# Patient Record
Sex: MALE | Race: WHITE | NOT HISPANIC OR LATINO | URBAN - METROPOLITAN AREA
[De-identification: names, ages, dates, MRNs, and addresses within clinical notes are randomized per-mention and may not be internally consistent; named-entity substitution may affect disease eponyms.]

---

## 2022-12-01 ENCOUNTER — TELEPHONE (OUTPATIENT)
Dept: SURGERY | Facility: CLINIC | Age: 67
End: 2022-12-01
Payer: MEDICARE

## 2022-12-01 NOTE — TELEPHONE ENCOUNTER
Spoke with Mr. Myers sister, informed her to bring a colored copy of Mr. Sandoval colonoscopy and his MRI on a disc. She verbalized understanding.

## 2022-12-08 ENCOUNTER — TELEPHONE (OUTPATIENT)
Dept: SURGERY | Facility: CLINIC | Age: 67
End: 2022-12-08
Payer: MEDICARE

## 2022-12-08 NOTE — TELEPHONE ENCOUNTER
Noted patient's address is about 2 hours away. Brother said patient on wheelchair. They have friends/relatives in [place] but for longer stay here, they will need lodging assistance.    ----- Message -----  From: Jesenia Wu RN  Sent: 12/7/2022   4:36 PM CST  To: Cheyenne Teixeira RN, Valdez Nair MD, #  Subject: RE: New Rectal Cancer                            Spoke with brother, Khris Way.  Assessed current needs of patient/family.  Explained process & expectations on consult apptmnt.    Emailing them details of schedule, reminders, driving directions and my contact info.   Will follow pt.    - Jesenia      ----- Message -----  From: Verónica Waite MA  Sent: 12/7/2022   3:53 PM CST  To: Jesenia Wu RN, #  Subject: New Rectal Cancer                                Good evening Jesenia, this is a new rectal cancer pt Dr. Nair will be seeing on 12/20, I have spoken with the sister who is the main contact and she knows to bring recent scans and colored copy of colonoscopy. Dr. Nair asked if you could speak with them and just make sure they are prepared for visit, make sure they have everything the doctor will need for the visit since they are coming from pretty far?       Thank You!

## 2022-12-19 ENCOUNTER — TELEPHONE (OUTPATIENT)
Dept: SURGERY | Facility: CLINIC | Age: 67
End: 2022-12-19
Payer: MEDICARE

## 2022-12-19 NOTE — TELEPHONE ENCOUNTER
Spoke with  Lucia , appointment rescheduled for 1/10 @3:00p at the [facility]. Appointment will be mailed out to pt.

## 2022-12-19 NOTE — TELEPHONE ENCOUNTER
----- Message from Hollie Crawford RN sent at 12/19/2022  3:31 PM CST -----    ----- Message -----  From: Jarred Shafer  Sent: 12/19/2022   3:29 PM CST  To: , #     Pt is would like to reschedule appt on 12.20.22 due to not having transportation. Would like to  be reached @[phone] or [phone]

## 2023-01-09 ENCOUNTER — TELEPHONE (OUTPATIENT)
Dept: SURGERY | Facility: CLINIC | Age: 68
End: 2023-01-09
Payer: MEDICARE

## 2023-01-09 PROBLEM — C20 RECTAL ADENOCARCINOMA: Status: ACTIVE | Noted: 2023-01-09

## 2023-02-03 ENCOUNTER — TELEPHONE (OUTPATIENT)
Dept: SURGERY | Facility: CLINIC | Age: 68
End: 2023-02-03
Payer: MEDICARE

## 2023-02-03 NOTE — TELEPHONE ENCOUNTER
----- Message from Dewayne Aguirre sent at 2/3/2023 12:02 PM CST -----  Contact: angel @[phone]  Caller is calling in to get the details for pt appt as his family does not want to come with him. The facility need to know if anything will need to be signed.  Please call to discuss further.

## 2023-02-03 NOTE — TELEPHONE ENCOUNTER
Spoke with Lina Gonzalez and explained that it would be best if a family member could come to appointment on Tuesday. Explained that plan of care will be discussed, as well as possible surgery or other interventions. Lina states that she will speak to patient's brother and relay previous message. Callback number provided.

## 2023-02-07 ENCOUNTER — TELEPHONE (OUTPATIENT)
Dept: SURGERY | Facility: CLINIC | Age: 68
End: 2023-02-07
Payer: MEDICARE

## 2023-02-07 ENCOUNTER — OFFICE VISIT (OUTPATIENT)
Dept: SURGERY | Facility: CLINIC | Age: 68
End: 2023-02-07
Payer: MEDICARE

## 2023-02-07 ENCOUNTER — LAB VISIT (OUTPATIENT)
Dept: LAB | Facility: HOSPITAL | Age: 68
End: 2023-02-07
Attending: COLON & RECTAL SURGERY
Payer: MEDICARE

## 2023-02-07 DIAGNOSIS — C20 RECTAL CANCER: Primary | ICD-10-CM

## 2023-02-07 DIAGNOSIS — C20 RECTAL CANCER: ICD-10-CM

## 2023-02-07 LAB
ALBUMIN SERPL BCP-MCNC: 3.5 G/DL (ref 3.5–5.2)
ALP SERPL-CCNC: 72 U/L (ref 55–135)
ALT SERPL W/O P-5'-P-CCNC: 14 U/L (ref 10–44)
ANION GAP SERPL CALC-SCNC: 12 MMOL/L (ref 8–16)
AST SERPL-CCNC: 16 U/L (ref 10–40)
BASOPHILS # BLD AUTO: 0.03 K/UL (ref 0–0.2)
BASOPHILS NFR BLD: 0.5 % (ref 0–1.9)
BILIRUB SERPL-MCNC: 0.4 MG/DL (ref 0.1–1)
BUN SERPL-MCNC: 16 MG/DL (ref 8–23)
CALCIUM SERPL-MCNC: 9 MG/DL (ref 8.7–10.5)
CEA SERPL-MCNC: 4.4 NG/ML (ref 0–5)
CHLORIDE SERPL-SCNC: 106 MMOL/L (ref 95–110)
CO2 SERPL-SCNC: 23 MMOL/L (ref 23–29)
CREAT SERPL-MCNC: 0.7 MG/DL (ref 0.5–1.4)
DIFFERENTIAL METHOD: ABNORMAL
EOSINOPHIL # BLD AUTO: 0.1 K/UL (ref 0–0.5)
EOSINOPHIL NFR BLD: 1.8 % (ref 0–8)
ERYTHROCYTE [DISTWIDTH] IN BLOOD BY AUTOMATED COUNT: 14.5 % (ref 11.5–14.5)
EST. GFR  (NO RACE VARIABLE): >60 ML/MIN/1.73 M^2
GLUCOSE SERPL-MCNC: 120 MG/DL (ref 70–110)
HCT VFR BLD AUTO: 36.9 % (ref 40–54)
HGB BLD-MCNC: 11.1 G/DL (ref 14–18)
IMM GRANULOCYTES # BLD AUTO: 0.02 K/UL (ref 0–0.04)
IMM GRANULOCYTES NFR BLD AUTO: 0.3 % (ref 0–0.5)
LYMPHOCYTES # BLD AUTO: 1.1 K/UL (ref 1–4.8)
LYMPHOCYTES NFR BLD: 16.2 % (ref 18–48)
MCH RBC QN AUTO: 25.1 PG (ref 27–31)
MCHC RBC AUTO-ENTMCNC: 30.1 G/DL (ref 32–36)
MCV RBC AUTO: 84 FL (ref 82–98)
MONOCYTES # BLD AUTO: 0.5 K/UL (ref 0.3–1)
MONOCYTES NFR BLD: 8 % (ref 4–15)
NEUTROPHILS # BLD AUTO: 4.8 K/UL (ref 1.8–7.7)
NEUTROPHILS NFR BLD: 73.2 % (ref 38–73)
NRBC BLD-RTO: 0 /100 WBC
PLATELET # BLD AUTO: 262 K/UL (ref 150–450)
PMV BLD AUTO: 8.3 FL (ref 9.2–12.9)
POTASSIUM SERPL-SCNC: 4.3 MMOL/L (ref 3.5–5.1)
PROT SERPL-MCNC: 7.6 G/DL (ref 6–8.4)
RBC # BLD AUTO: 4.42 M/UL (ref 4.6–6.2)
SODIUM SERPL-SCNC: 141 MMOL/L (ref 136–145)
WBC # BLD AUTO: 6.54 K/UL (ref 3.9–12.7)

## 2023-02-07 PROCEDURE — 99999 PR PBB SHADOW E&M-EST. PATIENT-LVL III: CPT | Mod: PBBFAC,,, | Performed by: COLON & RECTAL SURGERY

## 2023-02-07 PROCEDURE — 85025 COMPLETE CBC W/AUTO DIFF WBC: CPT | Performed by: COLON & RECTAL SURGERY

## 2023-02-07 PROCEDURE — 99213 OFFICE O/P EST LOW 20 MIN: CPT | Mod: PBBFAC | Performed by: COLON & RECTAL SURGERY

## 2023-02-07 PROCEDURE — 45330 DIAGNOSTIC SIGMOIDOSCOPY: CPT | Mod: S$PBB,,, | Performed by: COLON & RECTAL SURGERY

## 2023-02-07 PROCEDURE — 99999 PR PBB SHADOW E&M-EST. PATIENT-LVL III: ICD-10-PCS | Mod: PBBFAC,,, | Performed by: COLON & RECTAL SURGERY

## 2023-02-07 PROCEDURE — 45330 DIAGNOSTIC SIGMOIDOSCOPY: CPT | Mod: PBBFAC | Performed by: COLON & RECTAL SURGERY

## 2023-02-07 PROCEDURE — 99205 OFFICE O/P NEW HI 60 MIN: CPT | Mod: S$PBB,25,, | Performed by: COLON & RECTAL SURGERY

## 2023-02-07 PROCEDURE — 99205 PR OFFICE/OUTPT VISIT, NEW, LEVL V, 60-74 MIN: ICD-10-PCS | Mod: S$PBB,25,, | Performed by: COLON & RECTAL SURGERY

## 2023-02-07 PROCEDURE — 80053 COMPREHEN METABOLIC PANEL: CPT | Performed by: COLON & RECTAL SURGERY

## 2023-02-07 PROCEDURE — 36415 COLL VENOUS BLD VENIPUNCTURE: CPT | Performed by: COLON & RECTAL SURGERY

## 2023-02-07 PROCEDURE — 82378 CARCINOEMBRYONIC ANTIGEN: CPT | Performed by: COLON & RECTAL SURGERY

## 2023-02-07 PROCEDURE — 45330 PR SIGMOIDOSCOPY,DIAG2STIC: ICD-10-PCS | Mod: S$PBB,,, | Performed by: COLON & RECTAL SURGERY

## 2023-02-07 RX ORDER — HUMAN INSULIN 100 [USP'U]/ML
INJECTION, SUSPENSION SUBCUTANEOUS
COMMUNITY
Start: 2023-02-02 | End: 2023-04-25 | Stop reason: CLARIF

## 2023-02-07 RX ORDER — DORZOLAMIDE HYDROCHLORIDE AND TIMOLOL MALEATE 20; 5 MG/ML; MG/ML
SOLUTION/ DROPS OPHTHALMIC
COMMUNITY
Start: 2022-12-17

## 2023-02-07 RX ORDER — LORATADINE 10 MG/1
10 TABLET ORAL
COMMUNITY

## 2023-02-07 RX ORDER — FENOFIBRATE 54 MG/1
TABLET ORAL
COMMUNITY
End: 2023-04-25 | Stop reason: CLARIF

## 2023-02-07 RX ORDER — HYDROXYZINE HYDROCHLORIDE 10 MG/1
10 TABLET, FILM COATED ORAL
COMMUNITY
Start: 2023-01-26 | End: 2023-02-25

## 2023-02-07 RX ORDER — LATANOPROST 50 UG/ML
1 SOLUTION/ DROPS OPHTHALMIC NIGHTLY
COMMUNITY

## 2023-02-07 RX ORDER — CLOBETASOL PROPIONATE 0.5 MG/ML
LOTION TOPICAL
COMMUNITY
Start: 2022-12-07

## 2023-02-07 RX ORDER — SERTRALINE HYDROCHLORIDE 50 MG/1
100 TABLET, FILM COATED ORAL NIGHTLY
COMMUNITY

## 2023-02-07 RX ORDER — NYSTATIN 100000 [USP'U]/G
POWDER TOPICAL
COMMUNITY

## 2023-02-07 RX ORDER — LEVETIRACETAM 750 MG/1
750 TABLET ORAL 2 TIMES DAILY
COMMUNITY

## 2023-02-07 RX ORDER — TAMSULOSIN HYDROCHLORIDE 0.4 MG/1
0.4 CAPSULE ORAL
COMMUNITY
Start: 2022-03-29 | End: 2023-04-25

## 2023-02-07 RX ORDER — KETOCONAZOLE 20 MG/ML
SHAMPOO, SUSPENSION TOPICAL
COMMUNITY
Start: 2022-12-07 | End: 2023-04-25 | Stop reason: CLARIF

## 2023-02-07 RX ORDER — POLYETHYLENE GLYCOL 3350 17 G/17G
17 POWDER, FOR SOLUTION ORAL
Status: ON HOLD | COMMUNITY
End: 2023-04-29 | Stop reason: SDUPTHER

## 2023-02-07 RX ORDER — OSELTAMIVIR PHOSPHATE 75 MG/1
CAPSULE ORAL
COMMUNITY
Start: 2022-11-22 | End: 2023-04-25 | Stop reason: CLARIF

## 2023-02-07 RX ORDER — BUSPIRONE HYDROCHLORIDE 7.5 MG/1
7.5 TABLET ORAL 2 TIMES DAILY
COMMUNITY
Start: 2023-01-03

## 2023-02-07 RX ORDER — POLYETHYLENE GLYCOL 3350, SODIUM SULFATE ANHYDROUS, SODIUM BICARBONATE, SODIUM CHLORIDE, POTASSIUM CHLORIDE 236; 22.74; 6.74; 5.86; 2.97 G/4L; G/4L; G/4L; G/4L; G/4L
POWDER, FOR SOLUTION ORAL
Status: ON HOLD | COMMUNITY
Start: 2022-11-07 | End: 2023-04-29 | Stop reason: HOSPADM

## 2023-02-07 RX ORDER — FLUTICASONE PROPIONATE 50 MCG
SPRAY, SUSPENSION (ML) NASAL
COMMUNITY
Start: 2022-11-22

## 2023-02-07 RX ORDER — METFORMIN HYDROCHLORIDE 500 MG/1
500 TABLET ORAL 2 TIMES DAILY WITH MEALS
COMMUNITY
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
## 2023-02-07 NOTE — TELEPHONE ENCOUNTER
CT scheduled for 2/15/ 23 at 10:00 am at [facility]. Spoke with Daquan in scheduling ([phone]) and CT appointment scheduled.     Order faxed to [phone] (scheduling at [facility])    Spoke with Jerrica(patient's brother and sister in law) regarding CT and instructed to arrive at 9:00 am for 10:00 appointment.    Called the [facility] ([phone]) and spoke with Portia. Informed of upcoming CT appointment. Relayed message that patient is to be NPO after midnight and to arrive at 9:00 for 10:00 appointment.    Message sent to PA department for CT scan.

## 2023-02-07 NOTE — PROVATION PATIENT INSTRUCTIONS
Discharge Summary/Instructions after an Endoscopic Procedure  Patient Name: Yan Way  Patient MRN: [id number]  Patient YOB: 1955 Tuesday, February 7, 2023  Valdez Maurer MD  Dear patient,  As a result of recent federal legislation (The Federal Cures Act), you may   receive lab or pathology results from your procedure in your [facility]   account before your physician is able to contact you. Your physician or   their representative will relay the results to you with their   recommendations at their soonest availability.  Thank you,  RESTRICTIONS:  During your procedure today, you received medications for sedation.  These   medications may affect your judgment, balance and coordination.  Therefore,   for 24 hours, you have the following restrictions:   - DO NOT drive a car, operate machinery, make legal/financial decisions,   sign important papers or drink alcohol.    ACTIVITY:  Today: no heavy lifting, straining or running due to procedural   sedation/anesthesia.  The following day: return to full activity including work.  DIET:  Eat and drink normally unless instructed otherwise.     TREATMENT FOR COMMON SIDE EFFECTS:  - Mild abdominal pain, nausea, belching, bloating or excessive gas:  rest,   eat lightly and use a heating pad.  - Sore Throat: treat with throat lozenges and/or gargle with warm salt   water.  - Because air was used during the procedure, expelling large amounts of air   from your rectum or belching is normal.  - If a bowel prep was taken, you may not have a bowel movement for 1-3 days.    This is normal.  SYMPTOMS TO WATCH FOR AND REPORT TO YOUR PHYSICIAN:  1. Abdominal pain or bloating, other than gas cramps.  2. Chest pain.  3. Back pain.  4. Signs of infection such as: chills or fever occurring within 24 hours   after the procedure.  5. Rectal bleeding, which would show as bright red, maroon, or black stools.   (A tablespoon of blood from the rectum is not serious,  especially if   hemorrhoids are present.)  6. Vomiting.  7. Weakness or dizziness.  GO DIRECTLY TO THE NEAREST EMERGENCY ROOM IF YOU HAVE ANY OF THE FOLLOWING:      Difficulty breathing              Chills and/or fever over 101 F   Persistent vomiting and/or vomiting blood   Severe abdominal pain   Severe chest pain   Black, tarry stools   Bleeding- more than one tablespoon   Any other symptom or condition that you feel may need urgent attention  Your doctor recommends these additional instructions:  If any biopsies were taken, your doctors clinic will contact you in 1 to 2   weeks with any results.  - Continue present medications.   - Discharge patient to home (ambulatory).   - Continue present medications.   - Repeat flexible sigmoidoscopy PRN for surveillance.  For questions, problems or results please call your physician - Valdez Maurer MD at Work:  [phone].  [facility], EMERGENCY ROOM PHONE NUMBER: [phone]  IF A COMPLICATION OR EMERGENCY SITUATION ARISES AND YOU ARE UNABLE TO REACH   YOUR PHYSICIAN - GO DIRECTLY TO THE EMERGENCY ROOM.  Valdez Maurer MD  2/7/2023 12:25:53 PM  This report has been verified and signed electronically.  Dear patient,  As a result of recent federal legislation (The Federal Cures Act), you may   receive lab or pathology results from your procedure in your [facility]   account before your physician is able to contact you. Your physician or   their representative will relay the results to you with their   recommendations at their soonest availability.  Thank you,  PROVATION

## 2023-02-07 NOTE — TELEPHONE ENCOUNTER
----- Message from Clinton Infante MA sent at 2/7/2023  2:18 PM CST -----  Regarding: Order needed  Order needed for CT abdomen and pelvis w/ contrast. Requesting to be sent in today.      Byron Cruz [phone] Fax

## 2023-02-23 ENCOUNTER — TELEPHONE (OUTPATIENT)
Dept: SURGERY | Facility: CLINIC | Age: 68
End: 2023-02-23
Payer: MEDICARE

## 2023-02-23 NOTE — TELEPHONE ENCOUNTER
----- Message from Tristin Salazar sent at 2/23/2023  1:11 PM CST -----  Contact: Truong Garibay brother requesting call back RE: would like know plan of treatment, please call to discuss        Confirmed contact below:  Contact Name:Yan Seamus  Phone Number: [phone]

## 2023-02-23 NOTE — TELEPHONE ENCOUNTER
Spoke with patient's brother, Truong (POA),. States that he has discs and reports, and was wondering what the next steps were. Instructed to mail discs and reports, address for [facility] given. Instructed to call a few days after mailing to verify if they have been received. Will follow up with next steps after Dr Maurer reviews imaging.

## 2023-02-23 NOTE — TELEPHONE ENCOUNTER
Spoke with patient's spouse/ sister? States that Mr. Way is in with another physician and asked if we could call back in 30 min. Alarm set.

## 2023-03-08 ENCOUNTER — TUMOR BOARD CONFERENCE (OUTPATIENT)
Dept: SURGERY | Facility: HOSPITAL | Age: 68
End: 2023-03-08
Payer: MEDICARE

## 2023-03-08 NOTE — PROGRESS NOTES
[facility]  Colon, Rectal and Anal Malignancies  Multi-disciplinary Tumor Board     [address]  [place], [place]  Tel: [phone]  Fax: [phone]  [email or web address]     Patient name: Yan Way   YOB: 1955   MRN: [id number]    Date of discussion: 03/08/2023    Thank you for referring Mr. Way to our care here at [facility]. Please allow us to summarize our review of records and recommendations.    The following disciplines were present for the discussion:   Colon and Rectal Surgery  Medical Oncology  Radiation Oncology  Pathology  Radiology    Endoscopy reviewed:   Date performed: 11/11/2022  Yes, complete evaluation of colon and rectum performed    CT Chest, Abdomen and Pelvis   Date performed: 3/3/2023   Performed at our facility: No  Metastatic disease present: No    MRI Rectal Cancer Protocol  Date performed: 12/1/2022   Performed at our facility: No  Tumor location in the rectum: Middle (5-10 cm)  Sphincter involvement: Absent  Pretreatment circumferential resection margin status: Not threatened    Pathology reviewed:   Yes, report only     Pre-treatment CEA:  Date performed: 2/7/2023  CEA Level:  4.4    Pre-treatment Clinical Stage:  T1/T2 - Tumor invades the muscularis propria  N0 - No regional lymph node metastasis suspected  M0 - No metastasis suspected    Based on our review of the current information we have made the following recommendations:    68M h/o DM2, HLD and seizures (wheelchair bound, lives in SNF, incontinent w diaper) p/w low rectal T1/2N0 mod diff adenoCA    Additional laboratory, imaging, or endoscopic studies  Optional: Consider TAMIS for difinitive T staging prior to pursuing LAR; if T1 (SM1), may not require LAR    Therapies  Surgical resection - Low anterior resection  Optional - TAMIS as noted above for T staging prior to definitive surgery    Clinical research study eligibility - No    Referrals  None    Please do  not hesitate to contact us for any questions.

## 2023-03-20 NOTE — PROGRESS NOTES
Called patient and his brother at the nursing facility.  Discussed options for treatment includin) proctectomy/low anterior resection with or without anastomosis.  Discussed that with anastomosis, he would have significant increase in his frequency, clustering, urgency, and fecal leakage.  Discussed that with a colostomy, he would have more independence and ease of cleaning but would have a semi permanent ostomy.  2) transanal full-thickness excision using the TAMIS platform.  Discussed that if he were to have high risk features, he may require adjuvant radiation versus completion proctectomy.  Discussed that adjuvant radiation would be an oncologic compromise.  Additionally, radiation would impact his function.    My recommendation was to proceed with a low anterior resection with end colostomy creation in order to cure/fully stage the rectal cancer as well as provide fecal diversion secondary to his significant immobility.  All questions were answered.  He wishes to consider this further with his family.  I will follow up with him later in the week.    BRISA Maurer MD, FACS, FASCRS  Staff Surgeon  Colon & Rectal Surgery

## 2023-03-21 ENCOUNTER — TELEPHONE (OUTPATIENT)
Dept: SURGERY | Facility: CLINIC | Age: 68
End: 2023-03-21
Payer: MEDICARE

## 2023-03-21 NOTE — TELEPHONE ENCOUNTER
Spoke with sister in law and brother. Dr. Maurer states that he does not need to see oncologist in [place] today for appointment. Family verbalizes understanding.

## 2023-04-04 ENCOUNTER — TELEPHONE (OUTPATIENT)
Dept: SURGERY | Facility: CLINIC | Age: 68
End: 2023-04-04
Payer: MEDICARE

## 2023-04-04 NOTE — TELEPHONE ENCOUNTER
Spoke with brother regarding scheduling surgery. Truong states that his brother is currently in the hospital right now for pneumonia. Explained that I will have Dr. Maurer reach out to discuss plan of care.

## 2023-04-04 NOTE — TELEPHONE ENCOUNTER
----- Message from Jose Miguel Noland sent at 4/4/2023  1:08 PM CDT -----  Regarding: Appt  Contact: Truong/ [phone]  Truong/ is calling to discuss scheduling surgery, please call

## 2023-04-07 ENCOUNTER — TELEPHONE (OUTPATIENT)
Dept: SURGERY | Facility: CLINIC | Age: 68
End: 2023-04-07
Payer: MEDICARE

## 2023-04-07 NOTE — TELEPHONE ENCOUNTER
I called and spoke with the patient's brother.  He is recently hospitalized for pneumonia but is now out and off of oxygen.  After a long discussion with the family and the patient, they wished to proceed with low anterior resection with permanent end colostomy.  Will plan to leave a short Sharon stump.  I do agree that this will provide him with improvement in his quality of life and greater ease of caretaking with a colostomy given his relative immobility.  Offered dates of April 19 and April 26th.  I will follow up with him on Monday.    BRISA Maurer MD, FACS, FASCRS  Staff Surgeon  Colon & Rectal Surgery

## 2023-04-11 ENCOUNTER — TELEPHONE (OUTPATIENT)
Dept: SURGERY | Facility: CLINIC | Age: 68
End: 2023-04-11
Payer: MEDICARE

## 2023-04-11 DIAGNOSIS — C20 RECTAL CANCER: Primary | ICD-10-CM

## 2023-04-11 RX ORDER — CIPROFLOXACIN 500 MG/1
500 TABLET ORAL 2 TIMES DAILY
Qty: 2 TABLET | Refills: 0 | Status: SHIPPED | OUTPATIENT
Start: 2023-04-11 | End: 2023-04-12

## 2023-04-11 RX ORDER — METRONIDAZOLE 500 MG/1
500 TABLET ORAL 2 TIMES DAILY
Qty: 2 TABLET | Refills: 0 | Status: SHIPPED | OUTPATIENT
Start: 2023-04-11 | End: 2023-04-12

## 2023-04-11 RX ORDER — POLYETHYLENE GLYCOL 3350 17 G/17G
POWDER, FOR SOLUTION ORAL
Qty: 289 G | Refills: 0 | Status: ON HOLD | OUTPATIENT
Start: 2023-04-11 | End: 2023-04-29 | Stop reason: HOSPADM

## 2023-04-11 NOTE — PROGRESS NOTES
Returned a phone call to the brother.  They wished to proceed with laparoscopic low anterior resection with end colostomy creation.  They are agreeable to April 26th.  Will reach out to the nursing home to arrange transportation.  Full bowel prep.  Will plan for marking and consents the morning of surgery.      BRISA Maurer MD, FACS, FASCRS  Staff Surgeon  Colon & Rectal Surgery

## 2023-04-11 NOTE — TELEPHONE ENCOUNTER
----- Message from BRISA Maurer MD sent at 4/11/2023 10:34 AM CDT -----  I will put him on for lap LAR with end colostomy on 4/26/23.   I spoke with his brother and they have agreed.      He is at a nursing home ([facility] [place], [place]  Their number is [phone].  Can you help for transport?      He will need a prep and we will do consents and marking in preop.     THANK YOU!    Mj

## 2023-04-11 NOTE — TELEPHONE ENCOUNTER
Spoke with Trudy at [facility] regarding upcoming surgery and pre-op instructions. Arrival time of 0900 provided. Instructions and information faxed to [phone]. Phone number provided for any concerns or questions.

## 2023-04-12 ENCOUNTER — PATIENT MESSAGE (OUTPATIENT)
Dept: RESEARCH | Facility: HOSPITAL | Age: 68
End: 2023-04-12
Payer: MEDICARE

## 2023-04-14 ENCOUNTER — TELEPHONE (OUTPATIENT)
Dept: SURGERY | Facility: CLINIC | Age: 68
End: 2023-04-14
Payer: MEDICARE

## 2023-04-14 NOTE — TELEPHONE ENCOUNTER
"Spoke with Sona at [facility] regarding surgical prep. Sona states, "another nurse found the information that was previously faxed." Denies further questions or concerns.       ----- Message from Rosie Callahan RN sent at 4/13/2023  5:27 PM CDT -----  Regarding: FW: Surgery Paperwork  Contact: Sona @ [phone]    ----- Message -----  From: Benita Vaca  Sent: 4/13/2023   3:58 PM CDT  To: Erasto Barker Staff  Subject: Surgery Paperwork                                Sona from [facility] is calling to get paperwork on pt's upcoming surgery. Asking if it can be faxed to [phone]      "

## 2023-04-14 NOTE — TELEPHONE ENCOUNTER
----- Message from Tiffanie Zimmerman sent at 4/14/2023 10:40 AM CDT -----  Who Called: Charge Nurse    What is the request in detail: Requesting call back from Nurse Browne to discuss instructions for patient's prep and medications instructions. Caller is asking for those to be faxed to [phone]. Please advise.     Can the clinic reply by [facility]? No    Best Call Back Number: [phone]    Additional Information:     Symptom Screen outcome:

## 2023-04-24 ENCOUNTER — TELEPHONE (OUTPATIENT)
Dept: SURGERY | Facility: CLINIC | Age: 68
End: 2023-04-24
Payer: MEDICARE

## 2023-04-24 NOTE — TELEPHONE ENCOUNTER
Talked to patient's brother Truong. Went over clear liquid diet for tomorrow and told him about the antibiotics sent. He seemed confused, explained to him a couple of times.     Will ask Hollie Crawford RN, to follow up on this tomorrow.

## 2023-04-24 NOTE — TELEPHONE ENCOUNTER
----- Message from Gillian Mata sent at 4/24/2023  5:06 PM CDT -----  Type: General Call Back     Name of Caller:Patients brother  Symptoms:pre operation information   Would the patient rather a call back or a response via [employer]? Call back   Best Call Back Number:[phone]  Additional Information: Patients brother indicates he is supposed to get a call from the providers office for some pre operation information. Patients brother indicates he would like a call as soon as possible. Patients brother indicates he is supposed to get some directions regarding fasting and medications as well. Please call back with further assistance if possible.

## 2023-04-25 ENCOUNTER — TELEPHONE (OUTPATIENT)
Dept: SURGERY | Facility: CLINIC | Age: 68
End: 2023-04-25
Payer: MEDICARE

## 2023-04-25 ENCOUNTER — ANESTHESIA EVENT (OUTPATIENT)
Dept: SURGERY | Facility: HOSPITAL | Age: 68
DRG: 331 | End: 2023-04-25
Payer: MEDICARE

## 2023-04-25 NOTE — PRE-PROCEDURE INSTRUCTIONS
PreOp Instructions given:   - Verbal medication information (what to hold and what to take)   - NPO guidelines   - Arrival place directions given; time to be given the day before procedure by the   Surgeon's Office   - Bathing with antibacterial soap   - Don't wear any jewelry or bring any valuables AM of surgery   - No makeup or moisturizer to face   - No perfume/cologne, powder, lotions or aftershave   Pt. verbalized understanding.   Pt denies any h/o Anesthesia/Sedation complications or side effects.

## 2023-04-25 NOTE — TELEPHONE ENCOUNTER
Left voicemail with callback number regarding brother's surgery tomorrow. Instructed to call back to discuss, if needed.

## 2023-04-25 NOTE — TELEPHONE ENCOUNTER
Spoke with Melo Gonzalez RN, at the [facility], to confirm arrival time for surgery tomorrow morning.

## 2023-04-25 NOTE — TELEPHONE ENCOUNTER
----- Message from Kelsey Adhikari sent at 4/25/2023  2:21 PM CDT -----  JAMEY Pinedo calling regarding Patient Advice for having questions about the surgery on tomorrow for the PT, call back [phone]

## 2023-04-25 NOTE — ANESTHESIA PREPROCEDURE EVALUATION
[facility] [address]  Anesthesia Pre-Operative Evaluation        Patient Name: Yan Way  YOB: 1955  MRN: [id number]    SUBJECTIVE:     Pre-operative Evaluation for Procedure(s) (LRB):  PROCTECTOMY, LAPAROSCOPIC, ERAS high (N/A)  SIGMOIDOSCOPY, FLEXIBLE (N/A)     04/25/2023    Yan Way is a 68 y.o. male with a PMHx significant for T2DM, seizure disorder (on Keppra), and multiple remote traumatic lower extremity fractures (now wheelchair bound with limited mobility) with rectal adenocarcinoma for which he has elected surgical resection.      He now presents for the above procedure(s) with Colon & Rectal Surgery - Dr. Maurer    Previous Airway (08/11/2021 - via [facility]Everywhere):   Blade: Mac 3  Airway Device: ETT  Secured: 21cm at lips  Comments: Easy intubation, Easy insertion    Patient Active Problem List   Diagnosis    Rectal adenocarcinoma       Review of patient's allergies indicates:   Allergen Reactions    Latex, natural rubber Anxiety       Current Outpatient Medications   Medication Instructions    busPIRone (BUSPAR) 7.5 mg, Oral, 2 times daily    clobetasoL (CLOBEX) 0.05 % lotion Topical (Top)    dorzolamide-timolol 2-0.5% (COSOPT) 22.3-6.8 mg/mL ophthalmic solution Both Eyes    fluticasone propionate (FLONASE) 50 mcg/actuation nasal spray Each Nostril    insulin NPH-insulin regular, 70/30, 100 unit/mL (70-30) injection 36 Units in AM and 26 Units in PM    latanoprost 0.005 % ophthalmic solution 1 drop, Ophthalmic, Nightly    levETIRAcetam (KEPPRA) 750 mg, Oral, 2 times daily    loratadine (CLARITIN) 10 mg, Oral    magnesium chloride (MAG 64) 64 mg TbEC 1 tablet, Oral, Daily    metFORMIN (GLUCOPHAGE) 500 mg, Oral, 2 times daily with meals    nystatin (MYCOSTATIN) powder nystatin 100,000 unit/gram topical powder   APPLY TO THE AFFECTED AREA(S) BY TOPICAL ROUTE 2 TIMES PER DAY UNTIL HEALED    phenylephrine-DM-guaiFENesin 5- mg/5 mL Liqd Oral     polyethylene glycol (GLYCOLAX) 17 gram/dose powder Use 1 capful of Miralax per 8 ounces of clear liquid for 8 doses the night before surgery.    polyethylene glycol (GLYCOLAX) 17 g, Oral    polyethylene glycol (GOLYTELY) 236-22.74-6.74 -5.86 gram suspension No dose, route, or frequency recorded.    salicylic acid 2 g    sertraline (ZOLOFT) 100 mg, Oral, Nightly    tamsulosin (FLOMAX) 0.4 mg, Oral       Past Surgical History:   Procedure Laterality Date    BACK SURGERY      HIP REPLACEMENT ARTHROPLASTY      TIBIA FRACTURE SURGERY         Social History     Substance and Sexual Activity   Drug Use Not Currently     Alcohol Use: Not on file     Tobacco Use: Unknown    Smoking Tobacco Use: Never    Smokeless Tobacco Use: Unknown    Passive Exposure: Not on file       OBJECTIVE:     Vital Signs Range (Last 24H):         Significant Labs    Heme Profile  Lab Results   Component Value Date    WBC 6.54 02/07/2023    HGB 11.1 (L) 02/07/2023    HCT 36.9 (L) 02/07/2023     02/07/2023       Coagulation Studies  No results found for: LABPROT, INR, APTT    BMP  Lab Results   Component Value Date     02/07/2023    K 4.3 02/07/2023     02/07/2023    CO2 23 02/07/2023    BUN 16 02/07/2023    CREATININE 0.7 02/07/2023       Liver Function Tests  Lab Results   Component Value Date    AST 16 02/07/2023    ALT 14 02/07/2023    ALKPHOS 72 02/07/2023    BILITOT 0.4 02/07/2023    PROT 7.6 02/07/2023    ALBUMIN 3.5 02/07/2023       Endocrine Profile  No results found for: HGBA1C, TSH    Diagnostic Studies    MRI Pelvis with/without Contrast (12/01/2022 - via Media):      Cardiac Studies    EKG:   No results found for this or any previous visit.    Echocardiogram Adult (08/11/2021):  1.The estimated LV ejection fraction is 65%  2.There are no wall motion abnormalities observed.  3.Normal left atrial pressure and diastolic function.  4.There is mild mitral regurgitation.                                 5.The  estimated RV systolic pressure was not measured in the absence of an adequate TR jet.         6.Compared to the prior study dated 11/02/2020, there is no significant change.      ASSESSMENT/PLAN:     Yan Way is a 68 y.o. male with rectal cancer presenting for LAR with end colostomy creation.    Pre-op Assessment    I have reviewed the Patient Summary Reports.     I have reviewed the Nursing Notes.    I have reviewed the Medications.     Review of Systems  Anesthesia Hx:  No problems with previous Anesthesia  History of prior surgery of interest to airway management or planning:  Denies Personal Hx of Anesthesia complications.   Hematology/Oncology:         -- Anemia: Anemia of Chronic Disease Current/Recent Cancer. (recent rectal ca diagnosis with scope)   Cardiovascular:   Denies Hypertension.  Denies MI.    Denies Angina.  Functional Capacity very limited / < 3 METS  Denies Coronary Artery Disease.   Denies Hypertension.   Denies Disorder of Cardiac Rhythm    Pulmonary:   Denies COPD.  Denies Asthma.  Denies Shortness of breath.  Denies Asthma.  Denies Chronic Obstructive Pulmonary Disease (COPD).  Denies Obstructive Sleep Apnea (TRICE)   Renal/:   Denies Chronic Renal Disease.   Denies Kidney Function/Disease    Hepatic/GI:   Denies Liver Disease.    Musculoskeletal:  Musculoskeletal General/Symptoms: Functional capacity is wheelchair dependant.    Neurological:   Seizures (every 2-3 months)  Seizure Disorder  Denies CVA - Cerebrovasular Accident    Endocrine:   Diabetes, type 2, using insulin  Diabetes, Type 2 Diabetes  Denies Thyroid Disease           Anesthesia Plan  Type of Anesthesia, risks & benefits discussed:    Anesthesia Type: Gen ETT  Intra-op Monitoring Plan: Standard ASA Monitors and Art Line  Post Op Pain Control Plan: multimodal analgesia and IV/PO Opioids PRN  Induction:  IV  Airway Plan: Direct, Post-Induction  Informed Consent: Informed consent signed with the Patient and all parties  understand the risks and agree with anesthesia plan.  All questions answered.   ASA Score: 2  Day of Surgery Review of History & Physical: H&P Update referred to the surgeon/provider.    Ready For Surgery From Anesthesia Perspective.     .

## 2023-04-26 ENCOUNTER — ANESTHESIA (OUTPATIENT)
Dept: SURGERY | Facility: HOSPITAL | Age: 68
DRG: 331 | End: 2023-04-26
Payer: MEDICARE

## 2023-04-26 ENCOUNTER — HOSPITAL ENCOUNTER (INPATIENT)
Facility: HOSPITAL | Age: 68
LOS: 3 days | DRG: 331 | End: 2023-04-29
Attending: COLON & RECTAL SURGERY | Admitting: COLON & RECTAL SURGERY
Payer: MEDICARE

## 2023-04-26 DIAGNOSIS — C20 RECTAL ADENOCARCINOMA: Primary | ICD-10-CM

## 2023-04-26 DIAGNOSIS — K62.89 RECTAL MASS: ICD-10-CM

## 2023-04-26 DIAGNOSIS — E11.40 TYPE 2 DIABETES MELLITUS WITH DIABETIC NEUROPATHY, WITH LONG-TERM CURRENT USE OF INSULIN: ICD-10-CM

## 2023-04-26 DIAGNOSIS — R53.81 PHYSICAL DECONDITIONING: ICD-10-CM

## 2023-04-26 DIAGNOSIS — Z79.4 TYPE 2 DIABETES MELLITUS WITH DIABETIC NEUROPATHY, WITH LONG-TERM CURRENT USE OF INSULIN: ICD-10-CM

## 2023-04-26 DIAGNOSIS — E11.42 TYPE 2 DIABETES MELLITUS WITH DIABETIC POLYNEUROPATHY, WITH LONG-TERM CURRENT USE OF INSULIN: ICD-10-CM

## 2023-04-26 DIAGNOSIS — Z79.4 TYPE 2 DIABETES MELLITUS WITH DIABETIC POLYNEUROPATHY, WITH LONG-TERM CURRENT USE OF INSULIN: ICD-10-CM

## 2023-04-26 LAB
ABO + RH BLD: NORMAL
BLD GP AB SCN CELLS X3 SERPL QL: NORMAL
POCT GLUCOSE: 182 MG/DL (ref 70–110)
POCT GLUCOSE: 197 MG/DL (ref 70–110)
SPECIMEN OUTDATE: NORMAL

## 2023-04-26 PROCEDURE — 20600001 HC STEP DOWN PRIVATE ROOM

## 2023-04-26 PROCEDURE — 63600175 PHARM REV CODE 636 W HCPCS: Performed by: STUDENT IN AN ORGANIZED HEALTH CARE EDUCATION/TRAINING PROGRAM

## 2023-04-26 PROCEDURE — 71000033 HC RECOVERY, INTIAL HOUR: Performed by: COLON & RECTAL SURGERY

## 2023-04-26 PROCEDURE — 25000003 PHARM REV CODE 250: Performed by: NURSE PRACTITIONER

## 2023-04-26 PROCEDURE — 88309 PR  SURG PATH,LEVEL VI: ICD-10-PCS | Mod: 26,,, | Performed by: PATHOLOGY

## 2023-04-26 PROCEDURE — D9220A PRA ANESTHESIA: ICD-10-PCS | Mod: ANES,,, | Performed by: ANESTHESIOLOGY

## 2023-04-26 PROCEDURE — 63600175 PHARM REV CODE 636 W HCPCS: Performed by: NURSE ANESTHETIST, CERTIFIED REGISTERED

## 2023-04-26 PROCEDURE — 25000003 PHARM REV CODE 250: Performed by: NURSE ANESTHETIST, CERTIFIED REGISTERED

## 2023-04-26 PROCEDURE — 71000015 HC POSTOP RECOV 1ST HR: Performed by: COLON & RECTAL SURGERY

## 2023-04-26 PROCEDURE — 88309 TISSUE EXAM BY PATHOLOGIST: CPT | Mod: 26,,, | Performed by: PATHOLOGY

## 2023-04-26 PROCEDURE — 99900035 HC TECH TIME PER 15 MIN (STAT)

## 2023-04-26 PROCEDURE — 82962 GLUCOSE BLOOD TEST: CPT | Performed by: COLON & RECTAL SURGERY

## 2023-04-26 PROCEDURE — D9220A PRA ANESTHESIA: ICD-10-PCS | Mod: CRNA,,, | Performed by: NURSE ANESTHETIST, CERTIFIED REGISTERED

## 2023-04-26 PROCEDURE — 36415 COLL VENOUS BLD VENIPUNCTURE: CPT | Performed by: COLON & RECTAL SURGERY

## 2023-04-26 PROCEDURE — 44208 PR LAP,SURG,COLECTOMY,W/ANAST,W/COLOSTOMY: ICD-10-PCS | Mod: ,,, | Performed by: COLON & RECTAL SURGERY

## 2023-04-26 PROCEDURE — 25000003 PHARM REV CODE 250: Performed by: STUDENT IN AN ORGANIZED HEALTH CARE EDUCATION/TRAINING PROGRAM

## 2023-04-26 PROCEDURE — 63600175 PHARM REV CODE 636 W HCPCS: Performed by: ANESTHESIOLOGY

## 2023-04-26 PROCEDURE — 71000039 HC RECOVERY, EACH ADD'L HOUR: Performed by: COLON & RECTAL SURGERY

## 2023-04-26 PROCEDURE — D9220A PRA ANESTHESIA: Mod: CRNA,,, | Performed by: NURSE ANESTHETIST, CERTIFIED REGISTERED

## 2023-04-26 PROCEDURE — 63600175 PHARM REV CODE 636 W HCPCS: Performed by: NURSE PRACTITIONER

## 2023-04-26 PROCEDURE — 88309 TISSUE EXAM BY PATHOLOGIST: CPT | Performed by: PATHOLOGY

## 2023-04-26 PROCEDURE — 36000710: Performed by: COLON & RECTAL SURGERY

## 2023-04-26 PROCEDURE — 27201423 OPTIME MED/SURG SUP & DEVICES STERILE SUPPLY: Performed by: COLON & RECTAL SURGERY

## 2023-04-26 PROCEDURE — 37000009 HC ANESTHESIA EA ADD 15 MINS: Performed by: COLON & RECTAL SURGERY

## 2023-04-26 PROCEDURE — D9220A PRA ANESTHESIA: Mod: ANES,,, | Performed by: ANESTHESIOLOGY

## 2023-04-26 PROCEDURE — 86900 BLOOD TYPING SEROLOGIC ABO: CPT | Performed by: NURSE PRACTITIONER

## 2023-04-26 PROCEDURE — 44208 L COLECTOMY/COLOPROCTOSTOMY: CPT | Mod: ,,, | Performed by: COLON & RECTAL SURGERY

## 2023-04-26 PROCEDURE — 36000711: Performed by: COLON & RECTAL SURGERY

## 2023-04-26 PROCEDURE — 37000008 HC ANESTHESIA 1ST 15 MINUTES: Performed by: COLON & RECTAL SURGERY

## 2023-04-26 PROCEDURE — 94761 N-INVAS EAR/PLS OXIMETRY MLT: CPT

## 2023-04-26 PROCEDURE — S0030 INJECTION, METRONIDAZOLE: HCPCS | Performed by: STUDENT IN AN ORGANIZED HEALTH CARE EDUCATION/TRAINING PROGRAM

## 2023-04-26 RX ORDER — OXYCODONE HYDROCHLORIDE 5 MG/1
5 TABLET ORAL EVERY 6 HOURS PRN
Status: DISCONTINUED | OUTPATIENT
Start: 2023-04-26 | End: 2023-04-29 | Stop reason: HOSPADM

## 2023-04-26 RX ORDER — SERTRALINE HYDROCHLORIDE 100 MG/1
100 TABLET, FILM COATED ORAL NIGHTLY
Status: DISCONTINUED | OUTPATIENT
Start: 2023-04-26 | End: 2023-04-29 | Stop reason: HOSPADM

## 2023-04-26 RX ORDER — DEXTROSE 40 %
15 GEL (GRAM) ORAL
Status: DISCONTINUED | OUTPATIENT
Start: 2023-04-26 | End: 2023-04-29 | Stop reason: HOSPADM

## 2023-04-26 RX ORDER — METRONIDAZOLE 500 MG/100ML
500 INJECTION, SOLUTION INTRAVENOUS
Status: DISCONTINUED | OUTPATIENT
Start: 2023-04-26 | End: 2023-04-26 | Stop reason: HOSPADM

## 2023-04-26 RX ORDER — GABAPENTIN 300 MG/1
300 CAPSULE ORAL
Status: COMPLETED | OUTPATIENT
Start: 2023-04-26 | End: 2023-04-26

## 2023-04-26 RX ORDER — GABAPENTIN 300 MG/1
300 CAPSULE ORAL 3 TIMES DAILY
Status: DISCONTINUED | OUTPATIENT
Start: 2023-04-26 | End: 2023-04-26

## 2023-04-26 RX ORDER — SODIUM CHLORIDE 9 MG/ML
INJECTION, SOLUTION INTRAVENOUS
Status: DISCONTINUED | OUTPATIENT
Start: 2023-04-26 | End: 2023-04-26 | Stop reason: HOSPADM

## 2023-04-26 RX ORDER — ONDANSETRON 2 MG/ML
4 INJECTION INTRAMUSCULAR; INTRAVENOUS EVERY 12 HOURS PRN
Status: DISCONTINUED | OUTPATIENT
Start: 2023-04-26 | End: 2023-04-29 | Stop reason: HOSPADM

## 2023-04-26 RX ORDER — TAMSULOSIN HYDROCHLORIDE 0.4 MG/1
0.4 CAPSULE ORAL DAILY
Status: DISCONTINUED | OUTPATIENT
Start: 2023-04-27 | End: 2023-04-29 | Stop reason: HOSPADM

## 2023-04-26 RX ORDER — DEXAMETHASONE SODIUM PHOSPHATE 4 MG/ML
INJECTION, SOLUTION INTRA-ARTICULAR; INTRALESIONAL; INTRAMUSCULAR; INTRAVENOUS; SOFT TISSUE
Status: DISCONTINUED | OUTPATIENT
Start: 2023-04-26 | End: 2023-04-26

## 2023-04-26 RX ORDER — FLUTICASONE PROPIONATE 50 MCG
2 SPRAY, SUSPENSION (ML) NASAL DAILY
Status: DISCONTINUED | OUTPATIENT
Start: 2023-04-27 | End: 2023-04-29 | Stop reason: HOSPADM

## 2023-04-26 RX ORDER — LEVETIRACETAM 750 MG/1
750 TABLET ORAL 2 TIMES DAILY
Status: DISCONTINUED | OUTPATIENT
Start: 2023-04-26 | End: 2023-04-29 | Stop reason: HOSPADM

## 2023-04-26 RX ORDER — SODIUM CHLORIDE 0.9 % (FLUSH) 0.9 %
3 SYRINGE (ML) INJECTION
Status: DISCONTINUED | OUTPATIENT
Start: 2023-04-26 | End: 2023-04-26 | Stop reason: HOSPADM

## 2023-04-26 RX ORDER — OXYCODONE HYDROCHLORIDE 10 MG/1
10 TABLET ORAL EVERY 4 HOURS PRN
Status: DISCONTINUED | OUTPATIENT
Start: 2023-04-26 | End: 2023-04-29 | Stop reason: HOSPADM

## 2023-04-26 RX ORDER — ONDANSETRON 2 MG/ML
4 INJECTION INTRAMUSCULAR; INTRAVENOUS DAILY PRN
Status: DISCONTINUED | OUTPATIENT
Start: 2023-04-26 | End: 2023-04-26 | Stop reason: HOSPADM

## 2023-04-26 RX ORDER — SODIUM CHLORIDE 9 MG/ML
INJECTION, SOLUTION INTRAVENOUS CONTINUOUS
Status: DISCONTINUED | OUTPATIENT
Start: 2023-04-26 | End: 2023-04-27

## 2023-04-26 RX ORDER — TRAMADOL HYDROCHLORIDE 50 MG/1
50 TABLET ORAL EVERY 6 HOURS PRN
Status: DISCONTINUED | OUTPATIENT
Start: 2023-04-26 | End: 2023-04-29 | Stop reason: HOSPADM

## 2023-04-26 RX ORDER — SODIUM CHLORIDE 0.9 % (FLUSH) 0.9 %
10 SYRINGE (ML) INJECTION
Status: DISCONTINUED | OUTPATIENT
Start: 2023-04-26 | End: 2023-04-29 | Stop reason: HOSPADM

## 2023-04-26 RX ORDER — PHENYLEPHRINE HYDROCHLORIDE 10 MG/ML
INJECTION INTRAVENOUS
Status: DISCONTINUED | OUTPATIENT
Start: 2023-04-26 | End: 2023-04-26

## 2023-04-26 RX ORDER — GABAPENTIN 300 MG/1
300 CAPSULE ORAL 3 TIMES DAILY
Status: DISCONTINUED | OUTPATIENT
Start: 2023-04-26 | End: 2023-04-29 | Stop reason: HOSPADM

## 2023-04-26 RX ORDER — LIDOCAINE HYDROCHLORIDE ANHYDROUS AND DEXTROSE MONOHYDRATE .8; 5 G/100ML; G/100ML
INJECTION, SOLUTION INTRAVENOUS CONTINUOUS PRN
Status: DISCONTINUED | OUTPATIENT
Start: 2023-04-26 | End: 2023-04-26

## 2023-04-26 RX ORDER — ACETAMINOPHEN 10 MG/ML
1000 INJECTION, SOLUTION INTRAVENOUS EVERY 8 HOURS
Status: DISCONTINUED | OUTPATIENT
Start: 2023-04-26 | End: 2023-04-27

## 2023-04-26 RX ORDER — LIDOCAINE HYDROCHLORIDE 20 MG/ML
INJECTION, SOLUTION EPIDURAL; INFILTRATION; INTRACAUDAL; PERINEURAL
Status: DISCONTINUED | OUTPATIENT
Start: 2023-04-26 | End: 2023-04-26

## 2023-04-26 RX ORDER — ROCURONIUM BROMIDE 10 MG/ML
INJECTION, SOLUTION INTRAVENOUS
Status: DISCONTINUED | OUTPATIENT
Start: 2023-04-26 | End: 2023-04-26

## 2023-04-26 RX ORDER — IBUPROFEN 400 MG/1
800 TABLET ORAL EVERY 8 HOURS
Status: DISCONTINUED | OUTPATIENT
Start: 2023-04-27 | End: 2023-04-27

## 2023-04-26 RX ORDER — OXYCODONE AND ACETAMINOPHEN 5; 325 MG/1; MG/1
1 TABLET ORAL
Status: DISCONTINUED | OUTPATIENT
Start: 2023-04-26 | End: 2023-04-26

## 2023-04-26 RX ORDER — HEPARIN SODIUM 5000 [USP'U]/ML
5000 INJECTION, SOLUTION INTRAVENOUS; SUBCUTANEOUS EVERY 8 HOURS
Status: COMPLETED | OUTPATIENT
Start: 2023-04-26 | End: 2023-04-26

## 2023-04-26 RX ORDER — MUPIROCIN 20 MG/G
OINTMENT TOPICAL 2 TIMES DAILY
Status: DISCONTINUED | OUTPATIENT
Start: 2023-04-26 | End: 2023-04-29 | Stop reason: HOSPADM

## 2023-04-26 RX ORDER — CETIRIZINE HYDROCHLORIDE 10 MG/1
10 TABLET ORAL DAILY
Status: DISCONTINUED | OUTPATIENT
Start: 2023-04-27 | End: 2023-04-29 | Stop reason: HOSPADM

## 2023-04-26 RX ORDER — KETAMINE HCL IN 0.9 % NACL 50 MG/5 ML
SYRINGE (ML) INTRAVENOUS
Status: DISCONTINUED | OUTPATIENT
Start: 2023-04-26 | End: 2023-04-26

## 2023-04-26 RX ORDER — ONDANSETRON 2 MG/ML
INJECTION INTRAMUSCULAR; INTRAVENOUS
Status: DISCONTINUED | OUTPATIENT
Start: 2023-04-26 | End: 2023-04-26

## 2023-04-26 RX ORDER — MUPIROCIN 20 MG/G
1 OINTMENT TOPICAL
Status: COMPLETED | OUTPATIENT
Start: 2023-04-26 | End: 2023-04-26

## 2023-04-26 RX ORDER — LATANOPROST 50 UG/ML
1 SOLUTION/ DROPS OPHTHALMIC NIGHTLY
Status: DISCONTINUED | OUTPATIENT
Start: 2023-04-26 | End: 2023-04-29 | Stop reason: HOSPADM

## 2023-04-26 RX ORDER — HALOPERIDOL 5 MG/ML
0.5 INJECTION INTRAMUSCULAR EVERY 10 MIN PRN
Status: DISCONTINUED | OUTPATIENT
Start: 2023-04-26 | End: 2023-04-26 | Stop reason: HOSPADM

## 2023-04-26 RX ORDER — ALVIMOPAN 12 MG/1
12 CAPSULE ORAL 2 TIMES DAILY
Status: DISCONTINUED | OUTPATIENT
Start: 2023-04-27 | End: 2023-04-26

## 2023-04-26 RX ORDER — PROPOFOL 10 MG/ML
VIAL (ML) INTRAVENOUS
Status: DISCONTINUED | OUTPATIENT
Start: 2023-04-26 | End: 2023-04-26

## 2023-04-26 RX ORDER — LIDOCAINE HYDROCHLORIDE 10 MG/ML
1 INJECTION, SOLUTION EPIDURAL; INFILTRATION; INTRACAUDAL; PERINEURAL
Status: DISCONTINUED | OUTPATIENT
Start: 2023-04-26 | End: 2023-04-26 | Stop reason: HOSPADM

## 2023-04-26 RX ORDER — GLUCAGON 1 MG
1 KIT INJECTION
Status: DISCONTINUED | OUTPATIENT
Start: 2023-04-26 | End: 2023-04-29 | Stop reason: HOSPADM

## 2023-04-26 RX ORDER — DEXTROSE 40 %
30 GEL (GRAM) ORAL
Status: DISCONTINUED | OUTPATIENT
Start: 2023-04-26 | End: 2023-04-29 | Stop reason: HOSPADM

## 2023-04-26 RX ORDER — INSULIN ASPART 100 [IU]/ML
1-10 INJECTION, SOLUTION INTRAVENOUS; SUBCUTANEOUS
Status: DISCONTINUED | OUTPATIENT
Start: 2023-04-26 | End: 2023-04-27

## 2023-04-26 RX ORDER — ACETAMINOPHEN 500 MG
1000 TABLET ORAL EVERY 8 HOURS
Status: DISCONTINUED | OUTPATIENT
Start: 2023-04-27 | End: 2023-04-27

## 2023-04-26 RX ORDER — FENTANYL CITRATE 50 UG/ML
INJECTION, SOLUTION INTRAMUSCULAR; INTRAVENOUS
Status: DISCONTINUED | OUTPATIENT
Start: 2023-04-26 | End: 2023-04-26

## 2023-04-26 RX ORDER — METRONIDAZOLE 500 MG/100ML
INJECTION, SOLUTION INTRAVENOUS
Status: DISCONTINUED | OUTPATIENT
Start: 2023-04-26 | End: 2023-04-26

## 2023-04-26 RX ORDER — HYDROMORPHONE HYDROCHLORIDE 1 MG/ML
0.2 INJECTION, SOLUTION INTRAMUSCULAR; INTRAVENOUS; SUBCUTANEOUS EVERY 5 MIN PRN
Status: DISCONTINUED | OUTPATIENT
Start: 2023-04-26 | End: 2023-04-26 | Stop reason: HOSPADM

## 2023-04-26 RX ADMIN — SODIUM CHLORIDE, POTASSIUM CHLORIDE, SODIUM LACTATE AND CALCIUM CHLORIDE 1000 ML: 600; 310; 30; 20 INJECTION, SOLUTION INTRAVENOUS at 10:04

## 2023-04-26 RX ADMIN — HYDROMORPHONE HYDROCHLORIDE 0.2 MG: 1 INJECTION, SOLUTION INTRAMUSCULAR; INTRAVENOUS; SUBCUTANEOUS at 05:04

## 2023-04-26 RX ADMIN — LEVETIRACETAM 750 MG: 750 TABLET, FILM COATED ORAL at 10:04

## 2023-04-26 RX ADMIN — PHENYLEPHRINE HYDROCHLORIDE 200 MCG: 10 INJECTION INTRAVENOUS at 12:04

## 2023-04-26 RX ADMIN — INSULIN ASPART 2 UNITS: 100 INJECTION, SOLUTION INTRAVENOUS; SUBCUTANEOUS at 05:04

## 2023-04-26 RX ADMIN — Medication 25 MG: at 12:04

## 2023-04-26 RX ADMIN — METRONIDAZOLE 500 MG: 500 INJECTION, SOLUTION INTRAVENOUS at 12:04

## 2023-04-26 RX ADMIN — BUSPIRONE HYDROCHLORIDE 7.5 MG: 5 TABLET ORAL at 10:04

## 2023-04-26 RX ADMIN — PHENYLEPHRINE HYDROCHLORIDE 200 MCG: 10 INJECTION INTRAVENOUS at 01:04

## 2023-04-26 RX ADMIN — SODIUM CHLORIDE, SODIUM GLUCONATE, SODIUM ACETATE, POTASSIUM CHLORIDE, MAGNESIUM CHLORIDE, SODIUM PHOSPHATE, DIBASIC, AND POTASSIUM PHOSPHATE: .53; .5; .37; .037; .03; .012; .00082 INJECTION, SOLUTION INTRAVENOUS at 12:04

## 2023-04-26 RX ADMIN — Medication 15 MG: at 02:04

## 2023-04-26 RX ADMIN — ROCURONIUM BROMIDE 20 MG: 10 INJECTION, SOLUTION INTRAVENOUS at 01:04

## 2023-04-26 RX ADMIN — GABAPENTIN 300 MG: 300 CAPSULE ORAL at 05:04

## 2023-04-26 RX ADMIN — PHENYLEPHRINE HYDROCHLORIDE 1 MCG/KG/MIN: 10 INJECTION INTRAVENOUS at 02:04

## 2023-04-26 RX ADMIN — ROCURONIUM BROMIDE 50 MG: 10 INJECTION, SOLUTION INTRAVENOUS at 12:04

## 2023-04-26 RX ADMIN — GABAPENTIN 300 MG: 300 CAPSULE ORAL at 10:04

## 2023-04-26 RX ADMIN — LATANOPROST 1 DROP: 50 SOLUTION OPHTHALMIC at 11:04

## 2023-04-26 RX ADMIN — PROPOFOL 50 MG: 10 INJECTION, EMULSION INTRAVENOUS at 02:04

## 2023-04-26 RX ADMIN — SERTRALINE 100 MG: 100 TABLET, FILM COATED ORAL at 10:04

## 2023-04-26 RX ADMIN — ROCURONIUM BROMIDE 10 MG: 10 INJECTION, SOLUTION INTRAVENOUS at 02:04

## 2023-04-26 RX ADMIN — DEXAMETHASONE SODIUM PHOSPHATE 4 MG: 4 INJECTION INTRA-ARTICULAR; INTRALESIONAL; INTRAMUSCULAR; INTRAVENOUS; SOFT TISSUE at 03:04

## 2023-04-26 RX ADMIN — SODIUM CHLORIDE: 0.9 INJECTION, SOLUTION INTRAVENOUS at 12:04

## 2023-04-26 RX ADMIN — LIDOCAINE HYDROCHLORIDE 50 MG: 20 INJECTION, SOLUTION EPIDURAL; INFILTRATION; INTRACAUDAL at 12:04

## 2023-04-26 RX ADMIN — SUGAMMADEX 200 MG: 100 INJECTION, SOLUTION INTRAVENOUS at 03:04

## 2023-04-26 RX ADMIN — IBUPROFEN 800 MG: 800 INJECTION INTRAVENOUS at 11:04

## 2023-04-26 RX ADMIN — ONDANSETRON 4 MG: 2 INJECTION INTRAMUSCULAR; INTRAVENOUS at 03:04

## 2023-04-26 RX ADMIN — HEPARIN SODIUM 5000 UNITS: 5000 INJECTION INTRAVENOUS; SUBCUTANEOUS at 10:04

## 2023-04-26 RX ADMIN — MUPIROCIN 1 G: 20 OINTMENT TOPICAL at 10:04

## 2023-04-26 RX ADMIN — CEFTRIAXONE SODIUM 1 G: 1 INJECTION, SOLUTION INTRAVENOUS at 12:04

## 2023-04-26 RX ADMIN — Medication 10 MG: at 01:04

## 2023-04-26 RX ADMIN — LIDOCAINE HYDROCHLORIDE ANHYDROUS AND DEXTROSE MONOHYDRATE 0.02 MG/KG/MIN: .8; 5 INJECTION, SOLUTION INTRAVENOUS at 12:04

## 2023-04-26 RX ADMIN — INSULIN ASPART 1 UNITS: 100 INJECTION, SOLUTION INTRAVENOUS; SUBCUTANEOUS at 10:04

## 2023-04-26 RX ADMIN — PROPOFOL 40 MG: 10 INJECTION, EMULSION INTRAVENOUS at 01:04

## 2023-04-26 RX ADMIN — ACETAMINOPHEN 1000 MG: 10 INJECTION INTRAVENOUS at 10:04

## 2023-04-26 RX ADMIN — SODIUM CHLORIDE: 9 INJECTION, SOLUTION INTRAVENOUS at 05:04

## 2023-04-26 RX ADMIN — PHENYLEPHRINE HYDROCHLORIDE 200 MCG: 10 INJECTION INTRAVENOUS at 02:04

## 2023-04-26 RX ADMIN — PROPOFOL 100 MG: 10 INJECTION, EMULSION INTRAVENOUS at 12:04

## 2023-04-26 RX ADMIN — MUPIROCIN: 20 OINTMENT TOPICAL at 10:04

## 2023-04-26 RX ADMIN — FENTANYL CITRATE 50 MCG: 50 INJECTION, SOLUTION INTRAMUSCULAR; INTRAVENOUS at 12:04

## 2023-04-26 NOTE — TRANSFER OF CARE
"Anesthesia Transfer of Care Note    Patient: Yan Way    Procedure(s) Performed: Procedure(s) (LRB):  PROCTECTOMY, LAPAROSCOPIC, ERAS high (N/A)  SIGMOIDOSCOPY, FLEXIBLE (N/A)    Patient location: PACU    Anesthesia Type: general    Transport from OR: Transported from OR on 100% O2 by closed face mask with adequate spontaneous ventilation    Post pain: adequate analgesia    Post assessment: no apparent anesthetic complications    Post vital signs: stable    Level of consciousness: responds to stimulation    Nausea/Vomiting: no nausea/vomiting    Complications: none    Transfer of care protocol was followed      Last vitals:   Visit Vitals  BP (!) 149/75 (BP Location: Right arm, Patient Position: Lying)   Pulse 98   Temp 36.4 °C (97.6 °F) (Temporal)   Resp 17   Ht 5' 4" (1.626 m)   Wt 59 kg (130 lb)   SpO2 98%   BMI 22.31 kg/m²     "

## 2023-04-26 NOTE — NURSING TRANSFER
Nursing Transfer Note      4/26/2023     Reason patient is being transferred: post procedure     Transfer To: 1001    Transfer via stretcher    Transported by PCT     Medicines sent: yes     Any special needs or follow-up needed: routine     Chart send with patient: Yes    Notified: brother     Patient reassessed at: 1745 on 4/26

## 2023-04-26 NOTE — OP NOTE
OPERATIVE NOTE:    Yan Way  [id number]  1955    DATE OF PROCEDURE:   04/26/2023     PREOPERATIVE DIAGNOSIS:  Mid to low rectal cancer     POSTOPERATIVE DIAGNOSIS:  Low rectal cancer     PROCEDURES PERFORMED:  1.  Laparoscopic low anterior resection with creation of end colostomy  2.  Flexible sigmoidoscopy.     ATTENDING SURGEON:  Valdez Maurer M.D.     FELLOW:  Abril Alonso MD     ANESTHESIA:  General.     ESTIMATED BLOOD LOSS:  100 mL.     FINDINGS:  1.  Low rectal cancer palpable on digital exam.  Narrow pelvis.  Multiple distended small bowel loops.  Significantly distended bladder.  Low anterior resection performed with complete mesorectal excision.  Stapled below the level of the tumor.  On the back table, the specimen was opened and there was a 3 cm distal margin to the staple line.  End descending colostomy created secondary to patient's immobility.    Rectal Cancer - Synoptic Operative Report Summary    ASA score ASA 3 - Patient with moderate systemic disease with functional limitations   Case status Elective   Operation Low anterior resection   Modality Hand-assisted laparoscopic   Location of tumor within rectum Low   Height of lower edge of tumor from anal verge 6 cm   Mobilization of splenic flexure No   Level of ligation of CHELSIE CHELSIE   Level of ligation of IMV High   Distal margin 3 cm   Type of reconstruction None   Method(s) of testing anastomosis None   Stoma Colostomy   En bloc resection None   Metastasectomy None   Completeness of resection R0 (>=1 mm from tumor to closest margin)   Intraoperative complications None   Blood transfusion No   TME photograph Pathology report     [facility] Addendum    Required diversion prior to primary surgical intervention No   Associated with known or suspected hereditary disorder None   Primary or recurrent Primary malignancy   Ureteral catheters placed None   Flap reconstruction required Not required        COMPLICATIONS:  None apparent.      SPECIMENS:  1.  Sigmoid colon and rectum.    DRAINS:  None.     DISPOSITION:  PACU.     INDICATIONS:   68 y.o. male with paraplegia in mid to low rectal cancer.  Clinical early stage.  Unable to perform TAMIS secondary to anterior location.  Recommended low anterior resection.  Colostomy favored secondary to his immobility.    DESCRIPTION OF PROCEDURE:    After informed consent was reviewed, the patient was taken to the Operating Room and placed under general anesthesia.  A Aleman catheter was sterilely placed.  Well over 700 mL of urine evacuated.  Ceftriaxone and Flagyl were given for preoperative antibiotics.  The arms were appropriately padded and tucked and the patient was placed into the lithotomy position with care to protect his right leg with relative immobility..  After prepping and draping, a timeout was performed.  An 8 cm Pfannenstiel incision was made two fingerbreadths above the pubic symphysis for placement of a Gelport.  The skin was incised with a knife down to the anterior rectus fascia.  The anterior rectus fascia was incised in transverse fashion and elevated up to the umbilicus and down to the pubic symphysis.  The rectus muscles were then split and the abdomen was entered sharply with care to avoid injury to the underlying bowel.  The sleeve of the Gelport was then placed.  A small amount of ascites was evacuated.  Three 5 mm trocars were then placed with one in the left lower quadrant, one in the right lower quadrant, one just above the umbilicus.  The Gelport was occluded and the abdomen was insufflated.    Diagnostic laparoscopy was performed.   The small bowel was diffusely dilated.  Visualization of the ligament of Treitz was therefore difficult.  There was no signs of any metastatic disease.  The liver and peritoneum appeared healthy.  The patient was placed into Trendelenburg and rolled to the patient's right side.  A medial to lateral dissection of the left colon was performed  starting at the inferior mesenteric vein.  The inferior mesenteric vein was elevated.  Windows were made on either side and was divided  Just below the level of the pancreas.  The retroperitoneum was identified and swept posteriorly.  The mesentery of the left colon was elevated.  This continued laterally to the sidewall as well as superiorly to the superior pole of the kidney.  Inferiorly, dissection continued down below the level of the left colic artery.  The CHELSIE pedicle was elevated.  The underlying peritoneum was scored.  The retrorectal plane was entered.  The right and left ureter and hypogastric nerves were identified.  Dissection connected with the dissection from above.  Proximal to the left colic artery, high ligation of the inferior mesenteric artery was performed.  This was done with the ligasure.    The colon was then pulled medially and lateral attachments were taken down sharply.  Dissection continued up to the splenic flexure.  The splenic flexure was not mobilized as a colostomy would later be created.      The cap of the GelPort was then removed.    Remaining lateral attachments were divided with electrocautery.  The specimen was brought out.  The left colic artery was divided with the ligature.  Dissection continued up to the junction of the descending colon and the sigmoid colon.  The colon was divided at this level and the proximal bowel was packed out of the abdomen.  The distal bowel was elevated and a total mesorectal excision was performed.  We started posteriorly.  He had significant redundancy in his tissues.  The peritoneum was incised in the mesorectum was excised intact down to the level of the pelvic floor.  The pelvis was very narrow took a sharp turn anteriorly.  Anteriorly, the anterior peritoneal reflection was identified with some difficulty.  The anterior peritoneal reflection was incised.  Lateral stalks were divided with the ligature.  Dissection continued down into the anal  canal.  Dissection was difficult secondary to the narrow pelvis in the significantly dilated small bowel.  Tumor was palpable and was confirmed with flexible sigmoidoscopy.  We dissected well below the level of the tumor.  A contour stapler with a green load was inserted.  The prostate and bladder were brought off of the rectum and the contour was closed.  The stapler was fired and the specimen was removed.  On a back table, the specimen was opened and there was a 3 cm distal margin.  The pelvis was irrigated.  Hemostasis was assured.  In his site previously chosen in the left lower quadrant, circular incision was made in the skin.  Anterior rectus fascia was incised in longitudinal fashion.  The posterior rectus fascia was incised but offset from the anterior fascial incision in an effort to decrease hernia formation.  The descending colon was brought through the defect to sit above the level of the skin with no tension.  The abdomen was inspected.  Hemostasis was assured.  There was no twisting of the mesentery.    All members of the team then changed gowns and gloves.  New instruments were then used for abdominal closure.  The abdomen was then closed.  The posterior fascia and peritoneum were closed with a #1 running PDS.  Simple #1 PDS sutures were placed in the muscle to reapproximate the muscle and prevent diastasis.  The anterior rectus fascia was closed with a #1 running PDS as well.  All skin incisions were closed with 4-0 Vicryl in subcuticular fashion.  Steri-Strips were applied.  The colostomy was then matured.  The colon was divided and opened.  It was secured to the skin with interrupted 3-0 Vicryl suture in Hayley fashion.   The patient tolerated the procedure well.  There were no apparent intraoperative complications.  All sponge, needle, and instrument counts were correct x2.   The patient was extubated and taken to PACU in stable condition.    BRISA Maurer MD, FACS, FASCRS  Staff  Surgeon  Colon & Rectal Surgery

## 2023-04-26 NOTE — ANESTHESIA PROCEDURE NOTES
Intubation    Date/Time: 4/26/2023 12:19 PM  Performed by: Pratik Oconnor CRNA  Authorized by: Valdez Greene Jr., MD     Intubation:     Induction:  Intravenous    Intubated:  Postinduction    Mask Ventilation:  Easy mask    Attempts:  1    Attempted By:  CRNA    Method of Intubation:  Video laryngoscopy    Blade:  Hutchison 3    Laryngeal View Grade: Grade I - full view of cords      Difficult Airway Encountered?: No      Complications:  None    Airway Device:  Oral endotracheal tube    Airway Device Size:  7.5    Style/Cuff Inflation:  Cuffed (inflated to minimal occlusive pressure)    Inflation Amount (mL):  8    Tube secured:  23    Secured at:  The teeth    Placement Verified By:  Capnometry    Complicating Factors:  None    Findings Post-Intubation:  BS equal bilateral and atraumatic/condition of teeth unchanged

## 2023-04-26 NOTE — PLAN OF CARE
Pt. Is prepped for surgery. Family, brother is patient's medical historian. Anesthesia at bedside and has collected history.
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
## 2023-04-27 PROBLEM — Z79.4 TYPE 2 DIABETES MELLITUS WITH NEUROLOGIC COMPLICATION, WITH LONG-TERM CURRENT USE OF INSULIN: Status: ACTIVE | Noted: 2023-04-27

## 2023-04-27 PROBLEM — E11.49 TYPE 2 DIABETES MELLITUS WITH NEUROLOGIC COMPLICATION, WITH LONG-TERM CURRENT USE OF INSULIN: Status: ACTIVE | Noted: 2023-04-27

## 2023-04-27 PROBLEM — R53.81 PHYSICAL DECONDITIONING: Status: ACTIVE | Noted: 2023-04-27

## 2023-04-27 LAB
ANION GAP SERPL CALC-SCNC: 9 MMOL/L (ref 8–16)
BASOPHILS # BLD AUTO: 0.02 K/UL (ref 0–0.2)
BASOPHILS NFR BLD: 0.2 % (ref 0–1.9)
BUN SERPL-MCNC: 12 MG/DL (ref 8–23)
CALCIUM SERPL-MCNC: 7.3 MG/DL (ref 8.7–10.5)
CHLORIDE SERPL-SCNC: 109 MMOL/L (ref 95–110)
CO2 SERPL-SCNC: 22 MMOL/L (ref 23–29)
CREAT SERPL-MCNC: 1 MG/DL (ref 0.5–1.4)
DIFFERENTIAL METHOD: ABNORMAL
EOSINOPHIL # BLD AUTO: 0 K/UL (ref 0–0.5)
EOSINOPHIL NFR BLD: 0.1 % (ref 0–8)
ERYTHROCYTE [DISTWIDTH] IN BLOOD BY AUTOMATED COUNT: 15.5 % (ref 11.5–14.5)
EST. GFR  (NO RACE VARIABLE): >60 ML/MIN/1.73 M^2
ESTIMATED AVG GLUCOSE: 148 MG/DL (ref 68–131)
GLUCOSE SERPL-MCNC: 176 MG/DL (ref 70–110)
GLUCOSE SERPL-MCNC: 200 MG/DL (ref 70–110)
HBA1C MFR BLD: 6.8 % (ref 4–5.6)
HCO3 UR-SCNC: 22.7 MMOL/L (ref 24–28)
HCT VFR BLD AUTO: 27.9 % (ref 40–54)
HCT VFR BLD CALC: 27 %PCV (ref 36–54)
HGB BLD-MCNC: 8.4 G/DL (ref 14–18)
IMM GRANULOCYTES # BLD AUTO: 0.04 K/UL (ref 0–0.04)
IMM GRANULOCYTES NFR BLD AUTO: 0.4 % (ref 0–0.5)
LYMPHOCYTES # BLD AUTO: 0.8 K/UL (ref 1–4.8)
LYMPHOCYTES NFR BLD: 8.4 % (ref 18–48)
MAGNESIUM SERPL-MCNC: 1.5 MG/DL (ref 1.6–2.6)
MCH RBC QN AUTO: 24.6 PG (ref 27–31)
MCHC RBC AUTO-ENTMCNC: 30.1 G/DL (ref 32–36)
MCV RBC AUTO: 82 FL (ref 82–98)
MONOCYTES # BLD AUTO: 1 K/UL (ref 0.3–1)
MONOCYTES NFR BLD: 10.4 % (ref 4–15)
NEUTROPHILS # BLD AUTO: 7.7 K/UL (ref 1.8–7.7)
NEUTROPHILS NFR BLD: 80.5 % (ref 38–73)
NRBC BLD-RTO: 0 /100 WBC
PCO2 BLDA: 41.7 MMHG (ref 35–45)
PH SMN: 7.34 [PH] (ref 7.35–7.45)
PHOSPHATE SERPL-MCNC: 3.4 MG/DL (ref 2.7–4.5)
PLATELET # BLD AUTO: 177 K/UL (ref 150–450)
PMV BLD AUTO: 8.7 FL (ref 9.2–12.9)
PO2 BLDA: 161 MMHG (ref 80–100)
POC BE: -3 MMOL/L
POC IONIZED CALCIUM: 1.06 MMOL/L (ref 1.06–1.42)
POC SATURATED O2: 99 % (ref 95–100)
POC TCO2: 24 MMOL/L (ref 23–27)
POCT GLUCOSE: 175 MG/DL (ref 70–110)
POCT GLUCOSE: 205 MG/DL (ref 70–110)
POCT GLUCOSE: 231 MG/DL (ref 70–110)
POCT GLUCOSE: 233 MG/DL (ref 70–110)
POTASSIUM BLD-SCNC: 3.3 MMOL/L (ref 3.5–5.1)
POTASSIUM SERPL-SCNC: 3.8 MMOL/L (ref 3.5–5.1)
RBC # BLD AUTO: 3.41 M/UL (ref 4.6–6.2)
SAMPLE: ABNORMAL
SODIUM BLD-SCNC: 142 MMOL/L (ref 136–145)
SODIUM SERPL-SCNC: 140 MMOL/L (ref 136–145)
WBC # BLD AUTO: 9.55 K/UL (ref 3.9–12.7)

## 2023-04-27 PROCEDURE — 99222 PR INITIAL HOSPITAL CARE,LEVL II: ICD-10-PCS | Mod: ,,, | Performed by: NURSE PRACTITIONER

## 2023-04-27 PROCEDURE — 85025 COMPLETE CBC W/AUTO DIFF WBC: CPT | Performed by: STUDENT IN AN ORGANIZED HEALTH CARE EDUCATION/TRAINING PROGRAM

## 2023-04-27 PROCEDURE — 25000003 PHARM REV CODE 250: Performed by: STUDENT IN AN ORGANIZED HEALTH CARE EDUCATION/TRAINING PROGRAM

## 2023-04-27 PROCEDURE — 36415 COLL VENOUS BLD VENIPUNCTURE: CPT | Performed by: STUDENT IN AN ORGANIZED HEALTH CARE EDUCATION/TRAINING PROGRAM

## 2023-04-27 PROCEDURE — 97161 PT EVAL LOW COMPLEX 20 MIN: CPT

## 2023-04-27 PROCEDURE — 99222 1ST HOSP IP/OBS MODERATE 55: CPT | Mod: ,,, | Performed by: NURSE PRACTITIONER

## 2023-04-27 PROCEDURE — 80048 BASIC METABOLIC PNL TOTAL CA: CPT | Performed by: STUDENT IN AN ORGANIZED HEALTH CARE EDUCATION/TRAINING PROGRAM

## 2023-04-27 PROCEDURE — 83036 HEMOGLOBIN GLYCOSYLATED A1C: CPT | Performed by: NURSE PRACTITIONER

## 2023-04-27 PROCEDURE — 97530 THERAPEUTIC ACTIVITIES: CPT

## 2023-04-27 PROCEDURE — 25000003 PHARM REV CODE 250: Performed by: NURSE PRACTITIONER

## 2023-04-27 PROCEDURE — 20600001 HC STEP DOWN PRIVATE ROOM

## 2023-04-27 PROCEDURE — 63600175 PHARM REV CODE 636 W HCPCS: Performed by: NURSE PRACTITIONER

## 2023-04-27 PROCEDURE — 83735 ASSAY OF MAGNESIUM: CPT | Performed by: STUDENT IN AN ORGANIZED HEALTH CARE EDUCATION/TRAINING PROGRAM

## 2023-04-27 PROCEDURE — 97535 SELF CARE MNGMENT TRAINING: CPT

## 2023-04-27 PROCEDURE — 25000242 PHARM REV CODE 250 ALT 637 W/ HCPCS: Performed by: STUDENT IN AN ORGANIZED HEALTH CARE EDUCATION/TRAINING PROGRAM

## 2023-04-27 PROCEDURE — 63600175 PHARM REV CODE 636 W HCPCS: Performed by: STUDENT IN AN ORGANIZED HEALTH CARE EDUCATION/TRAINING PROGRAM

## 2023-04-27 PROCEDURE — 97165 OT EVAL LOW COMPLEX 30 MIN: CPT

## 2023-04-27 PROCEDURE — 36415 COLL VENOUS BLD VENIPUNCTURE: CPT | Performed by: NURSE PRACTITIONER

## 2023-04-27 PROCEDURE — 97542 WHEELCHAIR MNGMENT TRAINING: CPT

## 2023-04-27 PROCEDURE — 84100 ASSAY OF PHOSPHORUS: CPT | Performed by: STUDENT IN AN ORGANIZED HEALTH CARE EDUCATION/TRAINING PROGRAM

## 2023-04-27 RX ORDER — INSULIN ASPART 100 [IU]/ML
4 INJECTION, SOLUTION INTRAVENOUS; SUBCUTANEOUS
Status: DISCONTINUED | OUTPATIENT
Start: 2023-04-28 | End: 2023-04-28

## 2023-04-27 RX ORDER — INSULIN ASPART 100 [IU]/ML
0-5 INJECTION, SOLUTION INTRAVENOUS; SUBCUTANEOUS
Status: DISCONTINUED | OUTPATIENT
Start: 2023-04-27 | End: 2023-04-29

## 2023-04-27 RX ORDER — IBUPROFEN 400 MG/1
800 TABLET ORAL EVERY 8 HOURS
Status: DISCONTINUED | OUTPATIENT
Start: 2023-04-27 | End: 2023-04-29 | Stop reason: HOSPADM

## 2023-04-27 RX ORDER — ACETAMINOPHEN 500 MG
1000 TABLET ORAL EVERY 8 HOURS
Status: DISCONTINUED | OUTPATIENT
Start: 2023-04-27 | End: 2023-04-29 | Stop reason: HOSPADM

## 2023-04-27 RX ADMIN — LEVETIRACETAM 750 MG: 750 TABLET, FILM COATED ORAL at 09:04

## 2023-04-27 RX ADMIN — CETIRIZINE HYDROCHLORIDE 10 MG: 10 TABLET, FILM COATED ORAL at 09:04

## 2023-04-27 RX ADMIN — SERTRALINE 100 MG: 100 TABLET, FILM COATED ORAL at 09:04

## 2023-04-27 RX ADMIN — ACETAMINOPHEN 1000 MG: 500 TABLET ORAL at 09:04

## 2023-04-27 RX ADMIN — BUSPIRONE HYDROCHLORIDE 7.5 MG: 5 TABLET ORAL at 09:04

## 2023-04-27 RX ADMIN — GABAPENTIN 300 MG: 300 CAPSULE ORAL at 02:04

## 2023-04-27 RX ADMIN — MAGNESIUM 64 MG (MAGNESIUM CHLORIDE) TABLET,DELAYED RELEASE 64 MG: at 09:04

## 2023-04-27 RX ADMIN — TAMSULOSIN HYDROCHLORIDE 0.4 MG: 0.4 CAPSULE ORAL at 09:04

## 2023-04-27 RX ADMIN — LATANOPROST 1 DROP: 50 SOLUTION OPHTHALMIC at 09:04

## 2023-04-27 RX ADMIN — INSULIN DETEMIR 10 UNITS: 100 INJECTION, SOLUTION SUBCUTANEOUS at 10:04

## 2023-04-27 RX ADMIN — IBUPROFEN 800 MG: 400 TABLET, FILM COATED ORAL at 09:04

## 2023-04-27 RX ADMIN — FLUTICASONE PROPIONATE 100 MCG: 50 SPRAY, METERED NASAL at 09:04

## 2023-04-27 RX ADMIN — ACETAMINOPHEN 1000 MG: 500 TABLET ORAL at 03:04

## 2023-04-27 RX ADMIN — IBUPROFEN 800 MG: 800 INJECTION INTRAVENOUS at 05:04

## 2023-04-27 RX ADMIN — ACETAMINOPHEN 1000 MG: 10 INJECTION INTRAVENOUS at 05:04

## 2023-04-27 RX ADMIN — INSULIN ASPART 4 UNITS: 100 INJECTION, SOLUTION INTRAVENOUS; SUBCUTANEOUS at 03:04

## 2023-04-27 RX ADMIN — MUPIROCIN: 20 OINTMENT TOPICAL at 09:04

## 2023-04-27 RX ADMIN — GABAPENTIN 300 MG: 300 CAPSULE ORAL at 09:04

## 2023-04-27 RX ADMIN — IBUPROFEN 800 MG: 400 TABLET, FILM COATED ORAL at 03:04

## 2023-04-27 RX ADMIN — INSULIN ASPART 1 UNITS: 100 INJECTION, SOLUTION INTRAVENOUS; SUBCUTANEOUS at 10:04

## 2023-04-27 NOTE — PT/OT/SLP EVAL
Physical Therapy Co-Evaluation and Discharge Note    Patient Name:  Yan Way   MRN:  [id number]    Co-evaluation and co-treatment performed for this visit due to suspected patient need for two skilled therapists to ensure patient and staff safety and to accommodate for patient activity tolerance/pain management     Recommendations:     Discharge Recommendations: intermediate care facility/nursing home  Discharge Equipment Recommendations: none   Barriers to discharge: None    Assessment:     Yan Way is a 68 y.o. male admitted with a medical diagnosis of Rectal adenocarcinoma. .  At this time, patient is functioning at their prior level of function and does not require further acute PT services.     Recent Surgery: Procedure(s) (LRB):  PROCTECTOMY, LAPAROSCOPIC, ERAS high (N/A)  SIGMOIDOSCOPY, FLEXIBLE (N/A) 1 Day Post-Op    Plan:     During this hospitalization, patient does not require further acute PT services.  Please re-consult if situation changes.      Subjective     Chief Complaint: none  Patient/Family Comments/goals: pt. Agreeable to PT  Pain/Comfort:  Pain Rating 1: 0/10  Pain Rating Post-Intervention 1: 0/10    Patients cultural, spiritual, Yazidism conflicts given the current situation: no    Living Environment:  Pt. Is nursing home resident of two years.  Prior to admission, patients level of function was w/c bound.  Equipment used at home: bath bench, lift device, wheelchair.  Upon discharge, patient will have assistance from NH staff.    Objective:     Communicated with nursing prior to session.  Patient found supine with peripheral IV, oxygen, coleman catheter upon PT entry to room.    General Precautions: Standard, fall    Orthopedic Precautions:N/A   Braces: N/A  Respiratory Status: Nasal cannula, flow 2 L/min    Exams:  RLE ROM: WFL  RLE Strength: WFL  LLE ROM: WFL  LLE Strength: WFL    Functional Mobility:  Bed Mobility:     Rolling Right: minimum assistance  Scooting: minimum  assistance  Supine to Sit: minimum assistance  Sit to Supine: minimum assistance  Transfers:     Bed to Chair: maximal assistance with  hand-held assist  using  Stand Pivot  Balance: fair sitting  Wheelchair Propulsion:  Pt propelled Standard wheelchair x 300 feet on Level tile with  Bilateral upper extremity with Supervision or Set-up Assistance.     AM-PAC 6 CLICK MOBILITY  Total Score:12       Treatment and Education:  Discussed therapy needs, goals, and POC. Transferred to pt. To/from w/c with Max A. Pt. Performed ADLs in w/c in bathroom with OT.    AM-PAC 6 CLICK MOBILITY  Total Score:12     Patient left supine with all lines intact and call button in reach.    GOALS:   Multidisciplinary Problems       Physical Therapy Goals       Not on file              Multidisciplinary Problems (Resolved)          Problem: Physical Therapy    Goal Priority Disciplines Outcome Goal Variances Interventions   Physical Therapy Goal   (Resolved)     PT, PT/OT Met                         History:     Past Medical History:   Diagnosis Date    Diabetes     Hyperlipidemia     Rectal cancer 11/11/2022    pMMR    Seizure        Past Surgical History:   Procedure Laterality Date    BACK SURGERY      FLEXIBLE SIGMOIDOSCOPY N/A 4/26/2023    Procedure: SIGMOIDOSCOPY, FLEXIBLE;  Surgeon: BRISA Maurer MD;  Location: [address];  Service: Colon and Rectal;  Laterality: N/A;    HIP REPLACEMENT ARTHROPLASTY      LAPAROSCOPIC PROCTECTOMY N/A 4/26/2023    Procedure: PROCTECTOMY, LAPAROSCOPIC, ERAS high;  Surgeon: BRISA Maurer MD;  Location: [facility] OR [address] [place] [address];  Service: Colon and Rectal;  Laterality: N/A;    TIBIA FRACTURE SURGERY         Time Tracking:     PT Received On: 04/27/23  PT Start Time: 1331     PT Stop Time: 1405  PT Total Time (min): 34 min     Billable Minutes: Evaluation 10, Therapeutic Activity 10, and Train/Wheelchair Management 14      04/27/2023
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
## 2023-04-27 NOTE — PLAN OF CARE
[address] [facility]  Initial Discharge Assessment       Primary Care Provider: Primary Doctor No    Admission Diagnosis: Rectal cancer [C20]  Rectal mass [K62.89]    Admission Date: 4/26/2023  Expected Discharge Date:     Discharge Barriers Identified: (P) None    Payor: MEDICARE / Plan: MEDICARE PART A & B / Product Type: Government /     Extended Emergency Contact Information  Primary Emergency Contact: Celsa Way  Address: [address] [place] [id number] [facility]  Home Phone: [phone]  Work Phone: [phone]  Mobile Phone: [phone]  Relation: Brother    Discharge Plan A: (P) Return to nursing home  Discharge Plan B: (P) Return to Nursing Home      [facility] [address] [facility]  [address] [facility]  [facility] [place] [address]  Phone: [phone] Fax: [phone]      Initial Assessment (most recent)       Adult Discharge Assessment - 04/27/23 1429          Discharge Assessment    Assessment Type Discharge Planning Assessment (P)      Confirmed/corrected address, phone number and insurance Yes (P)      Confirmed Demographics Correct on Facesheet (P)      Source of Information family (P)      If unable to respond/provide information was family/caregiver contacted? Yes (P)      Contact Name/Number Brother Khris Way [phone] (P)      Communicated ELIEL with patient/caregiver Date not available/Unable to determine (P)      Reason For Admission Rectal adenocarcinoma (P)      People in Home facility resident (P)      Facility Arrived From: [facility] [phone] (P)      Do you expect to return to your current living situation? Yes (P)      Do you have help at home or someone to help you manage your care at home? Yes (P)      Who are your caregiver(s) and their phone number(s)? Facility staff (P)      Prior to hospitilization cognitive status: Unable to Assess (P)      Current cognitive status: Unable to Assess (P)      Walking  or Climbing Stairs ambulation difficulty, requires equipment (P)      Mobility Management wheelchair (P)      Dressing/Bathing bathing difficulty, requires equipment;bathing difficulty, assistance 1 person (P)      Dressing/Bathing Management tub bench, two assist with bathing (P)      Do you have any problems with: Needs other help (P)      Specify other help Meals, transportation, set up for hygiene (P)      Home Accessibility wheelchair accessible (P)      Home Layout Able to live on 1st floor (P)      Equipment Currently Used at Home wheelchair;bath bench;lift device (P)      Readmission within 30 days? No (P)      Patient currently being followed by outpatient case management? Yes (P)      If yes, name of outpatient case management following: other (comments) (P)    Facility    Do you currently have service(s) that help you manage your care at home? Yes (P)      Name and Contact number of agency Facility (P)      Is the pt/caregiver preference to resume services with current agency Yes (P)      Do you take prescription medications? Yes (P)      Do you have prescription coverage? Yes (P)      Coverage Medicare (P)      Do you have any problems affording any of your prescribed medications? No (P)      Is the patient taking medications as prescribed? yes (P)      Who is going to help you get home at discharge? Brother Khris Way [phone] and facility staff (P)      How do you get to doctors appointments? agency (P)      Are you on dialysis? No (P)      Do you take coumadin? No (P)      Discharge Plan A Return to nursing home (P)      Discharge Plan B Return to Nursing Home (P)      DME Needed Upon Discharge  none (P)      Discharge Plan discussed with: Sibling;Patient (P)      Name(s) and Number(s) Brother Khris Way [phone] (P)      Discharge Barriers Identified None (P)         Physical Activity    On average, how many days per week do you engage in moderate to strenuous exercise (like a brisk  walk)? 0 days (P)      On average, how many minutes do you engage in exercise at this level? 0 min (P)         Financial Resource Strain    How hard is it for you to pay for the very basics like food, housing, medical care, and heating? Not hard at all (P)         Housing Stability    In the last 12 months, was there a time when you were not able to pay the mortgage or rent on time? No (P)      In the last 12 months, was there a time when you did not have a steady place to sleep or slept in a shelter (including now)? No (P)         Transportation Needs    In the past 12 months, has lack of transportation kept you from medical appointments or from getting medications? No (P)      In the past 12 months, has lack of transportation kept you from meetings, work, or from getting things needed for daily living? No (P)         Food Insecurity    Within the past 12 months, you worried that your food would run out before you got the money to buy more. Never true (P)      Within the past 12 months, the food you bought just didn't last and you didn't have money to get more. Never true (P)         Stress    Do you feel stress - tense, restless, nervous, or anxious, or unable to sleep at night because your mind is troubled all the time - these days? Patient refused (P)         Social Connections    In a typical week, how many times do you talk on the phone with family, friends, or neighbors? Patient refused (P)      How often do you get together with friends or relatives? Patient refused (P)      How often do you attend Yazidi or Yarsani services? Patient refused (P)      Do you belong to any clubs or organizations such as Yazidi groups, unions, fraternal or athletic groups, or school groups? Patient refused (P)      How often do you attend meetings of the clubs or organizations you belong to? Patient refused (P)      Are you , , , , never , or living with a partner? Patient refused (P)          Alcohol Use    Q1: How often do you have a drink containing alcohol? Never (P)      Q2: How many drinks containing alcohol do you have on a typical day when you are drinking? Patient does not drink (P)      Q3: How often do you have six or more drinks on one occasion? Never (P)                    Pt is a jail resident at [place]. Pt was dependent with ADL's prior to admission and uses a lyft and a wheelchair, he is not on dialysis or Coumadin. Pt will need transportation back to his nursing home. SW will follow for all discharge planning needs.     DONELL Eugene LMSW  [facility]  [id number]

## 2023-04-27 NOTE — ASSESSMENT & PLAN NOTE
Patient's FSGs are uncontrolled due to hyperglycemia on current medication regimen.  Last A1c reviewed- No results found for: LABA1C, HGBA1C  Most recent fingerstick glucose reviewed- Recent Labs   Lab 04/26/23  1615 04/26/23  2303   POCTGLUCOSE 182* 197*     Current correctional scale  High  Maintain anti-hyperglycemic dose as follows-   Antihyperglycemics (From admission, onward)    Start     Stop Route Frequency Ordered    04/26/23 1645  insulin aspart U-100 pen 1-10 Units         -- SubQ Before meals & nightly PRN 04/26/23 1553        Hold Oral hypoglycemics while patient is in the hospital.    Consult endocrine

## 2023-04-27 NOTE — ASSESSMENT & PLAN NOTE
OR 4/26 LAR with colostomy    -await return of bowel function, colostomy functional  -continue multi modality for pain control  -encourage ambulation and use of IS  -april hose and SCD  -prophalactic lovenox and PPI  -consult wound care

## 2023-04-27 NOTE — SUBJECTIVE & OBJECTIVE
Interval HPI:   Overnight events: No acute events overnight. Patient in room 1001/1001 A. Blood glucose stable. BG at and above goal on current insulin regimen (SSI ). Steroid use- Dexamethasone  4 mg (administered perioperatively) . 1 Day Post-Op  Renal function- Normal   Vasopressors-  None       Endocrine will continue to follow and manage insulin orders inpatient.         Diet diabetic [facility];  Calorie     Eatin%  Nausea: No  Hypoglycemia and intervention: No  Fever: No  TPN and/or TF: No      PMH, PSH, FH, SH updated and reviewed     ROS:  Review of Systems  Constitutional: + for weight changes.  Eyes: Negative for visual disturbance.  Respiratory: Negative for cough.   Cardiovascular: Negative for chest pain.  Gastrointestinal: Negative for nausea.  Endocrine: + for polyuria, polydipsia.  Musculoskeletal: Negative for back pain.  Skin: Negative for rash.  Neurological: Negative for syncope.  Psychiatric/Behavioral: Negative for depression.    Current Medications and/or Treatments Impacting Glycemic Control  Immunotherapy:    Immunosuppressants       None          Steroids:   Hormones (From admission, onward)      None          Pressors:    Autonomic Drugs (From admission, onward)      None          Hyperglycemia/Diabetes Medications:   Antihyperglycemics (From admission, onward)      Start     Stop Route Frequency Ordered    23 1645  insulin aspart U-100 pen 1-10 Units         -- SubQ Before meals & nightly PRN 23 1553             PHYSICAL EXAMINATION:  Vitals:    23 1602   BP: (!) 109/59   Pulse: 89   Resp: 18   Temp: 99.3 °F (37.4 °C)     Body mass index is 22.31 kg/m².    Physical Exam  Constitutional: Well developed, well nourished, NAD.  ENT: External ears no masses with nose patent; normal hearing.  Neck: Supple; trachea midline.  Cardiovascular: Normal heart sounds, no LE edema. DP +2 bilaterally.  Lungs: Normal effort; lungs anterior bilaterally clear to  auscultation.  Abdomen: Soft, no masses, no hernias.  MS: No clubbing or cyanosis of nails noted; unable to assess gait.  Skin: No rashes, lesions, or ulcers; no nodules. Injection sites are ok. No lipo hypertropthy or atrophy.  Psychiatric: Good judgement and insight; normal mood and affect.  Neurological: Cranial nerves are grossly intact.  Foot: Nails in good condition, no amputations noted.

## 2023-04-27 NOTE — ANESTHESIA POSTPROCEDURE EVALUATION
Anesthesia Post Evaluation    Patient: Yan Way    Procedure(s) Performed: Procedure(s) (LRB):  PROCTECTOMY, LAPAROSCOPIC, ERAS high (N/A)  SIGMOIDOSCOPY, FLEXIBLE (N/A)    Final Anesthesia Type: general      Patient location during evaluation: PACU  Patient participation: Yes- Able to Participate  Level of consciousness: awake and alert and oriented  Post-procedure vital signs: reviewed and stable  Pain management: adequate  Airway patency: patent    PONV status at discharge: No PONV  Anesthetic complications: no      Cardiovascular status: hemodynamically stable  Respiratory status: nasal cannula  Hydration status: euvolemic  Follow-up not needed.          Vitals Value Taken Time   /58 04/27/23 1138   Temp 37.3 °C (99.1 °F) 04/27/23 1138   Pulse 76 04/27/23 1138   Resp 18 04/27/23 1138   SpO2 97 % 04/27/23 1138         Event Time   Out of Recovery 17:30:00         Pain/Kayy Score: Pain Rating Prior to Med Admin: 0 (4/27/2023  5:23 AM)  Kayy Score: 8 (4/27/2023  8:05 AM)

## 2023-04-27 NOTE — SUBJECTIVE & OBJECTIVE
Subjective:     Interval History: no acute events overnite, adequate pain control, colostomy funcitonal, no n/v    Post-Op Info:  Procedure(s) (LRB):  PROCTECTOMY, LAPAROSCOPIC, ERAS high (N/A)  SIGMOIDOSCOPY, FLEXIBLE (N/A)   1 Day Post-Op      Medications:  Continuous Infusions:  Scheduled Meds:   acetaminophen  1,000 mg Intravenous Q8H    Followed by    acetaminophen  1,000 mg Oral Q8H    busPIRone  7.5 mg Oral BID    cetirizine  10 mg Oral Daily    fluticasone propionate  2 spray Each Nostril Daily    gabapentin  300 mg Oral TID    ibuprofen  800 mg Intravenous Q8H    Followed by    ibuprofen  800 mg Oral Q8H    latanoprost  1 drop Both Eyes QHS    levETIRAcetam  750 mg Oral BID    magnesium chloride  64 mg Oral Daily    mupirocin   Nasal BID    sertraline  100 mg Oral QHS    tamsulosin  0.4 mg Oral Daily     PRN Meds:   dextrose 10%    dextrose 10%    dextrose    dextrose    glucagon (human recombinant)    insulin aspart U-100    ondansetron    oxyCODONE    oxyCODONE    sodium chloride 0.9%    traMADoL        Objective:     Vital Signs (Most Recent):  Temp: 97.6 °F (36.4 °C) (04/27/23 0805)  Pulse: 78 (04/27/23 0805)  Resp: 18 (04/27/23 0805)  BP: 111/60 (04/27/23 0805)  SpO2: 99 % (04/27/23 0805) Vital Signs (24h Range):  Temp:  [96.8 °F (36 °C)-100.3 °F (37.9 °C)] 97.6 °F (36.4 °C)  Pulse:  [] 78  Resp:  [14-20] 18  SpO2:  [92 %-100 %] 99 %  BP: (108-158)/(56-86) 111/60     Intake/Output - Last 3 Shifts         04/25 0700 04/26 0659 04/26 0700 04/27 0659 04/27 0700 04/28 0659    Other  230     IV Piggyback  1850 1676.4    Total Intake(mL/kg)  2080 (35.3) 1676.4 (28.4)    Urine (mL/kg/hr)  1150     Stool  550     Total Output  1700     Net  +380 +1676.4                   Physical Exam  Vitals and nursing note reviewed.   Constitutional:       Appearance: He is well-developed.   Cardiovascular:      Rate and Rhythm: Normal rate and regular rhythm.      Heart sounds: Normal heart sounds.   Pulmonary:       Effort: Pulmonary effort is normal. No respiratory distress.      Breath sounds: Normal breath sounds. No wheezing or rales.   Abdominal:      General: There is no distension.      Palpations: Abdomen is soft. There is no mass.      Tenderness: There is no abdominal tenderness. There is no guarding.      Comments: Abd inc line healing well  Colosotmy soft brown stool    Musculoskeletal:         General: Normal range of motion.   Skin:     General: Skin is warm and dry.   Neurological:      Mental Status: He is alert and oriented to person, place, and time.   Psychiatric:         Behavior: Behavior normal.         Thought Content: Thought content normal.         Judgment: Judgment normal.       Significant Labs:  BMP (Last 3 Results):   Recent Labs   Lab 04/27/23  0620   *      K 3.8      CO2 22*   BUN 12   CREATININE 1.0   CALCIUM 7.3*   MG 1.5*     CBC (Last 3 Results):   Recent Labs   Lab 04/26/23  1522 04/27/23  0620   WBC  --  9.55   RBC  --  3.41*   HGB  --  8.4*   HCT 27* 27.9*   PLT  --  177   MCV  --  82   MCH  --  24.6*   MCHC  --  30.1*       Significant Diagnostics:  None

## 2023-04-27 NOTE — PT/OT/SLP EVAL
Occupational Therapy   Evaluation and Discharge Note    Name: Yan Way  MRN: [id number]  Admitting Diagnosis: Rectal adenocarcinoma  Recent Surgery: Procedure(s) (LRB):  PROCTECTOMY, LAPAROSCOPIC, ERAS high (N/A)  SIGMOIDOSCOPY, FLEXIBLE (N/A) 1 Day Post-Op    Recommendations:     Discharge Recommendations: intermediate care facility/nursing home  Discharge Equipment Recommendations: none  Barriers to discharge:  None    Assessment:     Yan Way is a 68 y.o. male with a medical diagnosis of Rectal adenocarcinoma. At this time, patient is functioning at their prior level of function and does not require further acute OT services.     Plan:     During this hospitalization, patient does not require further acute OT services.  Please re-consult if situation changes.    Plan of Care Reviewed with: patient, sibling    Subjective     Chief Complaint: none  Patient/Family Comments/goals: patient agreed to therapy     Occupational Profile:  Living Environment: Patient has been a nursing home resident of two years.  Prior to admission, patients level of function was w/c bound and assistance with ADLs.   Equipment used at home: bath bench, lift device, wheelchair.  Upon discharge, patient will have assistance from NH staff.    Pain/Comfort:  Pain Rating 1: 0/10  Pain Rating Post-Intervention 1: 0/10    Patients cultural, spiritual, Tenriism conflicts given the current situation: no    Objective:     Communicated with: NSG prior to session.  Patient found HOB elevated with peripheral IV, coleman catheter, oxygen upon OT entry to room.    General Precautions: Standard, fall  Orthopedic Precautions: N/A  Braces: N/A  Respiratory Status: Nasal cannula    Occupational Performance:    Bed Mobility:    Patient completed Rolling/Turning to Right with minimum assistance  Patient completed Scooting/Bridging with minimum assistance  Patient completed Supine to Sit with minimum assistance  Patient completed Sit to Supine with  minimum assistance    Functional Mobility/Transfers:  Patient completed Sit <> Stand Transfer with maximal assistance  with  hand-held assist   Patient completed Bed <> w/c Transfer using Stand Pivot technique with maximal assistance with hand-held assist    Activities of Daily Living:  Grooming: minimum assistance oral care including rinsing mouth and washing face up in w/c at sink in bathroom.   Upper Body Dressing: minimum assistance [place] and donning front gown    Cognitive/Visual Perceptual:  Cognitive/Psychosocial Skills:     -       Oriented to: Person, Place, Time, and Situation   -       Follows Commands/attention:Follows multistep  commands  -       Communication: clear/fluent  -       Memory: No Deficits noted  -       Safety awareness/insight to disability: intact   -       Mood/Affect/Coping skills/emotional control: Appropriate to situation  Visual/Perceptual:      -Intact      Physical Exam:  Upper Extremity Range of Motion:     -       Right Upper Extremity: WFL  -       Left Upper Extremity: WFL  Upper Extremity Strength:    -       Right Upper Extremity: WFL  -       Left Upper Extremity: WFL   Strength:    -       Right Upper Extremity: WFL  -       Left Upper Extremity: WFL  Fine Motor Coordination:    -       Intact  Gross motor coordination:   WFL    AMPAC 6 Click ADL:  AMPAC Total Score: 16    Treatment & Education:  Role of OT and POC  ADL retraining  Functional mobility training  Safety  Discharge planning  Importance EOB/OOB activity    Patient propelled w/c in hallway with supervision prior to going to the bathroom for g/h task to assess household mobility distance and activity tolerance.     Co-treatment performed due to patient's multiple deficits requiring two skilled therapists to appropriately and safely assess patient's strength and endurance while facilitating functional tasks in addition to accommodating for patient's activity tolerance.     Patient left HOB elevated with all  lines intact, call button in reach, brother present, and all needs met.     GOALS:   Multidisciplinary Problems       Occupational Therapy Goals       Not on file              Multidisciplinary Problems (Resolved)          Problem: Occupational Therapy    Goal Priority Disciplines Outcome Interventions   Occupational Therapy Goal   (Resolved)     OT, PT/OT Met                        History:     Past Medical History:   Diagnosis Date    Diabetes     Hyperlipidemia     Rectal cancer 11/11/2022    pMMR    Seizure          Past Surgical History:   Procedure Laterality Date    BACK SURGERY      FLEXIBLE SIGMOIDOSCOPY N/A 4/26/2023    Procedure: SIGMOIDOSCOPY, FLEXIBLE;  Surgeon: BRISA Maurer MD;  Location: [facility] OR [address] [place] [address];  Service: Colon and Rectal;  Laterality: N/A;    HIP REPLACEMENT ARTHROPLASTY      LAPAROSCOPIC PROCTECTOMY N/A 4/26/2023    Procedure: PROCTECTOMY, LAPAROSCOPIC, ERAS high;  Surgeon: BRISA Maurer MD;  Location: [facility] OR [address] [place] [address];  Service: Colon and Rectal;  Laterality: N/A;    TIBIA FRACTURE SURGERY         Time Tracking:     OT Date of Treatment: 04/27/23  OT Start Time: 1332  OT Stop Time: 1407  OT Total Time (min): 35 min    Billable Minutes:Evaluation 12  Self Care/Home Management 23 4/27/2023

## 2023-04-27 NOTE — PLAN OF CARE
Pt is from [facility] in [place], [place]. CHRISTIANO called them at [phone], they are holding his bed for him to return early next week. CHRISTIANO will fax updates and NH orders when ready to [phone].    DONELL Eugene, LMSW [facility]  [id number]

## 2023-04-27 NOTE — ASSESSMENT & PLAN NOTE
Endocrinology consulted for BG management.   BG goal 140-180    WBD 0.3 units/kg/day  - Levemir (Insulin Detemir) 10 units nightly   - Novolog (Insulin Aspart) 4 units TIDWM and prn for BG excursions LDC SSI (150/50)  - BG checks AC/HS  - Hypoglycemia protocol in place  - If blood glucose greater than 300, please ask patient not to eat food or drink anything other than water until correctional insulin has brought it back below 250    ** Please notify Endocrine for any change and/or advance in diet**  ** Please call Endocrine for any BG related issues **    Discharge Planning:   TBD. Please notify endocrinology prior to discharge.

## 2023-04-27 NOTE — PLAN OF CARE
[facility] Plan of Care Note  Dx   Rectal Adenicarcinoma    Shift Events none    Goals of Care: pain management    Neuro: AAO X 4    Vital Signs: VSS    Respiratory: 2L    Diet: Clear liquid    Is patient tolerating current diet? yes    GTTS: NS @ 40    Urine Output/Bowel Movement: liquid stool from     Drains/Tubes/Tube Feeds (include total output/shift): colostomy 230 urostomy 350    Lines: 18 g L FA, 18 L hand    Accuchecks: ACHS    Skin: abdominal surgical incision    Fall Risk Score: 18    Activity level? Bedrest, wheelchair    Any scheduled procedures? none    Any safety concerns? Fall precautions    Other: none

## 2023-04-27 NOTE — PROGRESS NOTES
[facility]  Colorectal Surgery  Progress Note    Patient Name: Yan Way  MRN: [id number]  Admission Date: 4/26/2023  Hospital Length of Stay: 1 days  Attending Physician: BRISA Maurer MD    Subjective:     Interval History: no acute events overnite, adequate pain control, colostomy funcitonal, no n/v    Post-Op Info:  Procedure(s) (LRB):  PROCTECTOMY, LAPAROSCOPIC, ERAS high (N/A)  SIGMOIDOSCOPY, FLEXIBLE (N/A)   1 Day Post-Op      Medications:  Continuous Infusions:  Scheduled Meds:   acetaminophen  1,000 mg Intravenous Q8H    Followed by    acetaminophen  1,000 mg Oral Q8H    busPIRone  7.5 mg Oral BID    cetirizine  10 mg Oral Daily    fluticasone propionate  2 spray Each Nostril Daily    gabapentin  300 mg Oral TID    ibuprofen  800 mg Intravenous Q8H    Followed by    ibuprofen  800 mg Oral Q8H    latanoprost  1 drop Both Eyes QHS    levETIRAcetam  750 mg Oral BID    magnesium chloride  64 mg Oral Daily    mupirocin   Nasal BID    sertraline  100 mg Oral QHS    tamsulosin  0.4 mg Oral Daily     PRN Meds:   dextrose 10%    dextrose 10%    dextrose    dextrose    glucagon (human recombinant)    insulin aspart U-100    ondansetron    oxyCODONE    oxyCODONE    sodium chloride 0.9%    traMADoL        Objective:     Vital Signs (Most Recent):  Temp: 97.6 °F (36.4 °C) (04/27/23 0805)  Pulse: 78 (04/27/23 0805)  Resp: 18 (04/27/23 0805)  BP: 111/60 (04/27/23 0805)  SpO2: 99 % (04/27/23 0805) Vital Signs (24h Range):  Temp:  [96.8 °F (36 °C)-100.3 °F (37.9 °C)] 97.6 °F (36.4 °C)  Pulse:  [] 78  Resp:  [14-20] 18  SpO2:  [92 %-100 %] 99 %  BP: (108-158)/(56-86) 111/60     Intake/Output - Last 3 Shifts         04/25 0700 04/26 0659 04/26 0700 04/27 0659 04/27 0700 04/28 0659    Other  230     IV Piggyback  1850 1676.4    Total Intake(mL/kg)  2080 (35.3) 1676.4 (28.4)    Urine (mL/kg/hr)  1150     Stool  550     Total Output  1700     Net  +380 +1676.4                    Physical Exam  Vitals and nursing note reviewed.   Constitutional:       Appearance: He is well-developed.   Cardiovascular:      Rate and Rhythm: Normal rate and regular rhythm.      Heart sounds: Normal heart sounds.   Pulmonary:      Effort: Pulmonary effort is normal. No respiratory distress.      Breath sounds: Normal breath sounds. No wheezing or rales.   Abdominal:      General: There is no distension.      Palpations: Abdomen is soft. There is no mass.      Tenderness: There is no abdominal tenderness. There is no guarding.      Comments: Abd inc line healing well  Colosotmy soft brown stool    Musculoskeletal:         General: Normal range of motion.   Skin:     General: Skin is warm and dry.   Neurological:      Mental Status: He is alert and oriented to person, place, and time.   Psychiatric:         Behavior: Behavior normal.         Thought Content: Thought content normal.         Judgment: Judgment normal.       Significant Labs:  BMP (Last 3 Results):   Recent Labs   Lab 04/27/23  0620   *      K 3.8      CO2 22*   BUN 12   CREATININE 1.0   CALCIUM 7.3*   MG 1.5*     CBC (Last 3 Results):   Recent Labs   Lab 04/26/23  1522 04/27/23  0620   WBC  --  9.55   RBC  --  3.41*   HGB  --  8.4*   HCT 27* 27.9*   PLT  --  177   MCV  --  82   MCH  --  24.6*   MCHC  --  30.1*       Significant Diagnostics:  None    Assessment/Plan:     * Rectal adenocarcinoma  OR 4/26 LAR with colostomy    -await return of bowel function, colostomy functional  -continue multi modality for pain control  -encourage ambulation and use of IS  -april hose and SCD  -prophalactic lovenox and PPI  -consult wound care    Physical deconditioning  From nursing home, basically wheelchair bound  PT/OT  Turn every 2 hours     Type 2 diabetes mellitus with neurologic complication, with long-term current use of insulin  Patient's FSGs are uncontrolled due to hyperglycemia on current medication regimen.  Last A1c reviewed-  No results found for: LABA1C, HGBA1C  Most recent fingerstick glucose reviewed- Recent Labs   Lab 04/26/23  1615 04/26/23  2303   POCTGLUCOSE 182* 197*     Current correctional scale  High  Maintain anti-hyperglycemic dose as follows-   Antihyperglycemics (From admission, onward)    Start     Stop Route Frequency Ordered    04/26/23 1645  insulin aspart U-100 pen 1-10 Units         -- SubQ Before meals & nightly PRN 04/26/23 1553        Hold Oral hypoglycemics while patient is in the hospital.    Consult endocrine           Lena Anderson NP  Colorectal Surgery  [facility]
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
## 2023-04-28 ENCOUNTER — TELEPHONE (OUTPATIENT)
Dept: SURGERY | Facility: CLINIC | Age: 68
End: 2023-04-28
Payer: MEDICARE

## 2023-04-28 LAB
POCT GLUCOSE: 200 MG/DL (ref 70–110)
POCT GLUCOSE: 206 MG/DL (ref 70–110)
POCT GLUCOSE: 264 MG/DL (ref 70–110)

## 2023-04-28 PROCEDURE — 25000003 PHARM REV CODE 250: Performed by: STUDENT IN AN ORGANIZED HEALTH CARE EDUCATION/TRAINING PROGRAM

## 2023-04-28 PROCEDURE — 99232 SBSQ HOSP IP/OBS MODERATE 35: CPT | Mod: ,,, | Performed by: NURSE PRACTITIONER

## 2023-04-28 PROCEDURE — 63600175 PHARM REV CODE 636 W HCPCS: Performed by: STUDENT IN AN ORGANIZED HEALTH CARE EDUCATION/TRAINING PROGRAM

## 2023-04-28 PROCEDURE — 63600175 PHARM REV CODE 636 W HCPCS: Performed by: NURSE PRACTITIONER

## 2023-04-28 PROCEDURE — 25000003 PHARM REV CODE 250: Performed by: NURSE PRACTITIONER

## 2023-04-28 PROCEDURE — 99232 PR SUBSEQUENT HOSPITAL CARE,LEVL II: ICD-10-PCS | Mod: ,,, | Performed by: NURSE PRACTITIONER

## 2023-04-28 PROCEDURE — 20600001 HC STEP DOWN PRIVATE ROOM

## 2023-04-28 RX ORDER — ENOXAPARIN SODIUM 100 MG/ML
40 INJECTION SUBCUTANEOUS EVERY 24 HOURS
Status: DISCONTINUED | OUTPATIENT
Start: 2023-04-28 | End: 2023-04-29 | Stop reason: HOSPADM

## 2023-04-28 RX ORDER — INSULIN ASPART 100 [IU]/ML
5 INJECTION, SOLUTION INTRAVENOUS; SUBCUTANEOUS
Status: DISCONTINUED | OUTPATIENT
Start: 2023-04-28 | End: 2023-04-29 | Stop reason: HOSPADM

## 2023-04-28 RX ADMIN — ACETAMINOPHEN 1000 MG: 500 TABLET ORAL at 06:04

## 2023-04-28 RX ADMIN — INSULIN ASPART 5 UNITS: 100 INJECTION, SOLUTION INTRAVENOUS; SUBCUTANEOUS at 01:04

## 2023-04-28 RX ADMIN — INSULIN ASPART 2 UNITS: 100 INJECTION, SOLUTION INTRAVENOUS; SUBCUTANEOUS at 05:04

## 2023-04-28 RX ADMIN — MUPIROCIN: 20 OINTMENT TOPICAL at 10:04

## 2023-04-28 RX ADMIN — TAMSULOSIN HYDROCHLORIDE 0.4 MG: 0.4 CAPSULE ORAL at 10:04

## 2023-04-28 RX ADMIN — ENOXAPARIN SODIUM 40 MG: 40 INJECTION SUBCUTANEOUS at 04:04

## 2023-04-28 RX ADMIN — MAGNESIUM 64 MG (MAGNESIUM CHLORIDE) TABLET,DELAYED RELEASE 64 MG: at 10:04

## 2023-04-28 RX ADMIN — ACETAMINOPHEN 1000 MG: 500 TABLET ORAL at 09:04

## 2023-04-28 RX ADMIN — INSULIN ASPART 3 UNITS: 100 INJECTION, SOLUTION INTRAVENOUS; SUBCUTANEOUS at 10:04

## 2023-04-28 RX ADMIN — GABAPENTIN 300 MG: 300 CAPSULE ORAL at 10:04

## 2023-04-28 RX ADMIN — CETIRIZINE HYDROCHLORIDE 10 MG: 10 TABLET, FILM COATED ORAL at 10:04

## 2023-04-28 RX ADMIN — BUSPIRONE HYDROCHLORIDE 7.5 MG: 5 TABLET ORAL at 09:04

## 2023-04-28 RX ADMIN — IBUPROFEN 800 MG: 400 TABLET, FILM COATED ORAL at 06:04

## 2023-04-28 RX ADMIN — IBUPROFEN 800 MG: 400 TABLET, FILM COATED ORAL at 09:04

## 2023-04-28 RX ADMIN — BUSPIRONE HYDROCHLORIDE 7.5 MG: 5 TABLET ORAL at 10:04

## 2023-04-28 RX ADMIN — INSULIN ASPART 5 UNITS: 100 INJECTION, SOLUTION INTRAVENOUS; SUBCUTANEOUS at 05:04

## 2023-04-28 RX ADMIN — LEVETIRACETAM 750 MG: 750 TABLET, FILM COATED ORAL at 10:04

## 2023-04-28 RX ADMIN — LEVETIRACETAM 750 MG: 750 TABLET, FILM COATED ORAL at 09:04

## 2023-04-28 RX ADMIN — GABAPENTIN 300 MG: 300 CAPSULE ORAL at 02:04

## 2023-04-28 RX ADMIN — GABAPENTIN 300 MG: 300 CAPSULE ORAL at 09:04

## 2023-04-28 RX ADMIN — LATANOPROST 1 DROP: 50 SOLUTION OPHTHALMIC at 10:04

## 2023-04-28 RX ADMIN — SERTRALINE 100 MG: 100 TABLET, FILM COATED ORAL at 09:04

## 2023-04-28 RX ADMIN — IBUPROFEN 800 MG: 400 TABLET, FILM COATED ORAL at 01:04

## 2023-04-28 RX ADMIN — INSULIN ASPART 4 UNITS: 100 INJECTION, SOLUTION INTRAVENOUS; SUBCUTANEOUS at 10:04

## 2023-04-28 RX ADMIN — FLUTICASONE PROPIONATE 100 MCG: 50 SPRAY, METERED NASAL at 10:04

## 2023-04-28 NOTE — ASSESSMENT & PLAN NOTE
OR 4/26 LAR with colostomy    -colostomy functional  -continue multi modality for pain control  -encourage ambulation and use of IS  -april hose and SCD  -prophalactic lovenox and PPI  -consult wound care  -DC coleman today; due to void

## 2023-04-28 NOTE — TELEPHONE ENCOUNTER
----- Message from Pj Conway sent at 4/28/2023 11:26 AM CDT -----  Contact: Corry  Type:  Patient Call          Who Called: [facility]         Does the patient know what this is regarding?: Requesting a call back for a update on the pt since his surgery ; please advise         Best Call Back Number: [phone]             Additional Information:

## 2023-04-28 NOTE — SUBJECTIVE & OBJECTIVE
"Interval HPI:   Overnight events: No acute events overnight. Patient in room 1001/1001 A. Blood glucose stable. BG at and above goal on current insulin regimen (SSI, prandial, and basal insulin ). Steroid use- None. 2 Days Post-Op  Renal function- Normal   Vasopressors-  None       Endocrine will continue to follow and manage insulin orders inpatient.         Diet diabetic [facility];  Calorie     Eatin%  Nausea: No  Hypoglycemia and intervention: No  Fever: No  TPN and/or TF: No      BP (!) 147/75   Pulse 89   Temp 98.6 °F (37 °C)   Resp 16   Ht 5' 4" (1.626 m)   Wt 59 kg (130 lb)   SpO2 (!) 94%   BMI 22.31 kg/m²     Labs Reviewed and Include    No results for input(s): GLU, CALCIUM, ALBUMIN, PROT, NA, K, CO2, CL, BUN, CREATININE, ALKPHOS, ALT, AST, BILITOT in the last 24 hours.  Lab Results   Component Value Date    WBC 9.55 2023    HGB 8.4 (L) 2023    HCT 27.9 (L) 2023    MCV 82 2023     2023     No results for input(s): TSH, FREET4 in the last 168 hours.  Lab Results   Component Value Date    HGBA1C 6.8 (H) 2023       Nutritional status:   Body mass index is 22.31 kg/m².  Lab Results   Component Value Date    ALBUMIN 3.5 2023     No results found for: PREALBUMIN    Estimated Creatinine Clearance: 59.8 mL/min (based on SCr of 1 mg/dL).    Accu-Checks  Recent Labs     23  1019 23  1615 23  2303 23  1140 23  1604 23  2208 23  0806   POCTGLUCOSE 175* 182* 197* 231* 205* 233* 264*       Current Medications and/or Treatments Impacting Glycemic Control  Immunotherapy:    Immunosuppressants       None          Steroids:   Hormones (From admission, onward)      None          Pressors:    Autonomic Drugs (From admission, onward)      None          Hyperglycemia/Diabetes Medications:   Antihyperglycemics (From admission, onward)      Start     Stop Route Frequency Ordered    04/28/23 2100  insulin detemir " U-100 (Levemir) pen 15 Units         -- SubQ Nightly 04/28/23 0840    04/28/23 1130  insulin aspart U-100 pen 5 Units         -- SubQ 3 times daily with meals 04/28/23 0840    04/27/23 1915  insulin aspart U-100 pen 0-5 Units         -- SubQ Before meals & nightly PRN 04/27/23 1812
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
## 2023-04-28 NOTE — SUBJECTIVE & OBJECTIVE
Subjective:     Interval History: no acute events overnite, adequate pain control, colostomy funcitonal, no n/v    Post-Op Info:  Procedure(s) (LRB):  PROCTECTOMY, LAPAROSCOPIC, ERAS high (N/A)  SIGMOIDOSCOPY, FLEXIBLE (N/A)   2 Days Post-Op      Medications:  Continuous Infusions:  Scheduled Meds:   acetaminophen  1,000 mg Oral Q8H    busPIRone  7.5 mg Oral BID    cetirizine  10 mg Oral Daily    enoxaparin  40 mg Subcutaneous Daily    fluticasone propionate  2 spray Each Nostril Daily    gabapentin  300 mg Oral TID    ibuprofen  800 mg Oral Q8H    insulin aspart U-100  4 Units Subcutaneous TIDWM    insulin detemir U-100  10 Units Subcutaneous QHS    latanoprost  1 drop Both Eyes QHS    levETIRAcetam  750 mg Oral BID    magnesium chloride  64 mg Oral Daily    mupirocin   Nasal BID    sertraline  100 mg Oral QHS    tamsulosin  0.4 mg Oral Daily     PRN Meds:   dextrose 10%    dextrose 10%    dextrose    dextrose    glucagon (human recombinant)    insulin aspart U-100    ondansetron    oxyCODONE    oxyCODONE    sodium chloride 0.9%    traMADoL        Objective:     Vital Signs (Most Recent):  Temp: 97.8 °F (36.6 °C) (04/28/23 0450)  Pulse: 89 (04/28/23 0450)  Resp: 18 (04/28/23 0450)  BP: 134/64 (04/28/23 0450)  SpO2: 96 % (04/28/23 0450) Vital Signs (24h Range):  Temp:  [97.6 °F (36.4 °C)-99.3 °F (37.4 °C)] 97.8 °F (36.6 °C)  Pulse:  [76-89] 89  Resp:  [17-18] 18  SpO2:  [95 %-99 %] 96 %  BP: (100-134)/(58-65) 134/64     Intake/Output - Last 3 Shifts         04/26 0700  04/27 0659 04/27 0700 04/28 0659 04/28 0700 04/29 0659    Other 230      IV Piggyback 1850 1676.4     Total Intake(mL/kg) 2080 (35.3) 1676.4 (28.4)     Urine (mL/kg/hr) 1150 425 (0.3)     Stool 550 100     Total Output 1700 525     Net +380 +1151.4                    Physical Exam  Vitals and nursing note reviewed.   Constitutional:       Appearance: He is well-developed.   Cardiovascular:      Rate and Rhythm: Normal rate and regular rhythm.       Heart sounds: Normal heart sounds.   Pulmonary:      Effort: Pulmonary effort is normal. No respiratory distress.      Breath sounds: Normal breath sounds. No wheezing or rales.   Abdominal:      General: There is no distension.      Palpations: Abdomen is soft. There is no mass.      Tenderness: There is no abdominal tenderness. There is no guarding.      Comments: Abd inc line healing well  Colosotmy soft brown stool    Musculoskeletal:         General: Normal range of motion.   Skin:     General: Skin is warm and dry.   Neurological:      Mental Status: He is alert and oriented to person, place, and time.   Psychiatric:         Behavior: Behavior normal.         Thought Content: Thought content normal.         Judgment: Judgment normal.       Significant Labs:  BMP (Last 3 Results):   Recent Labs   Lab 04/27/23  0620   *      K 3.8      CO2 22*   BUN 12   CREATININE 1.0   CALCIUM 7.3*   MG 1.5*       CBC (Last 3 Results):   Recent Labs   Lab 04/26/23  1522 04/27/23  0620   WBC  --  9.55   RBC  --  3.41*   HGB  --  8.4*   HCT 27* 27.9*   PLT  --  177   MCV  --  82   MCH  --  24.6*   MCHC  --  30.1*         Significant Diagnostics:  None

## 2023-04-28 NOTE — PROGRESS NOTES
[facility]  Wound Care    Patient Name:  Yan Way   MRN:  [id number]  Date: 4/28/2023  Diagnosis: Rectal adenocarcinoma    History:     Past Medical History:   Diagnosis Date    Diabetes     Hyperlipidemia     Rectal cancer 11/11/2022    pMMR    Seizure        Social History     Socioeconomic History    Marital status: Single   Tobacco Use    Smoking status: Never   Substance and Sexual Activity    Alcohol use: Not Currently    Drug use: Not Currently     Social Determinants of Health     Financial Resource Strain: Low Risk     Difficulty of Paying Living Expenses: Not hard at all   Food Insecurity: No Food Insecurity    Worried About Running Out of Food in the Last Year: Never true    Ran Out of Food in the Last Year: Never true   Transportation Needs: No Transportation Needs    Lack of Transportation (Medical): No    Lack of Transportation (Non-Medical): No   Physical Activity: Inactive    Days of Exercise per Week: 0 days    Minutes of Exercise per Session: 0 min   Stress: Unknown    Feeling of Stress : Patient refused   Social Connections: Unknown    Frequency of Communication with Friends and Family: Patient refused    Frequency of Social Gatherings with Friends and Family: Patient refused    Attends Baptism Services: Patient refused    Active Member of Clubs or Organizations: Patient refused    Attends Club or Organization Meetings: Patient refused    Marital Status: Patient refused   Housing Stability: Unknown    Unable to Pay for Housing in the Last Year: No    Unstable Housing in the Last Year: No       Precautions:     Allergies as of 04/11/2023 - Reviewed 04/11/2023   Allergen Reaction Noted    Latex, natural rubber Anxiety 02/07/2023       [facility] Assessment Details/Treatment   Patient seen for wound care consultation.   Reviewed chart for this encounter.   See Flow Sheet for findings.    Pt lying inn bed with family at bedside. Ostomy lesson 1 initated. WOC Nurse noted a leaking ostomy pouch.  Removed old pouch, cleansed area with water soaked-gauze. Applied Cavilon and allowed to dry, cut pouch to 45 mm and placed bag with no s/sx of discomfort. Questions answered throughout the procedure. Pt denied any needs at this time.     RECOMMENDATIONS  Recommendations made to primary team for above plan via secured chat. WOC to follow up Orders placed.     Discussed POC with patient and primary RN.   See EMR for orders & patient education.  Discussed POC with primary team.    Nursing to continue care.  Nursing to maintain pressure injury prevention interventions.  Contact wound care for any further questions.     04/28/23 0930   WOCN Assessment   WOCN Total Time (mins) 30   Visit Date 04/28/23   Visit Time 0930   Consult Type New   WOCN Speciality Ostomy   WOCN List colostomy   Ostomy Type Colostomy   Procedure ostomy pouch   Intervention assessed;changed;applied;chart review;coordination of care   Teaching on-going        Colostomy 04/26/23 1513 LUQ   Placement Date/Time: 04/26/23 1513   Present Prior to Hospital Arrival?: No  Inserted by: MD  Location: LUQ   Stomal Appliance 1 piece;Changed;Leakage   Stoma Appearance round;moist;pink;protruding above skin level   Stoma Size (in) 45 mm   Peristomal Assessment Intact;Clean;Dry   Accessories/Skin Care cleansed w/ water;skin sealant   Stoma Function flatus;bowel sweat   Treatment Bag change   Tolerance no signs/symptoms of discomfort         04/28/2023

## 2023-04-28 NOTE — TELEPHONE ENCOUNTER
Spoke with Waleska, , at the [facility]. Informed that patient is doing well and should be ready for discharge either Sunday or Monday (per ).

## 2023-04-28 NOTE — PLAN OF CARE
Patient remained free from falls and injury throughout shift. No acute events overnight. Patient alert and oriented x4; vital signs stable. Aleman remains in place. Safety measures addressed --bed locked and in low position with siderails up x2 and call light/personal items within reach. Plan of care reviewed with patient; all questions and concerns addressed. Patient verbalizes understanding.          Problem: Adult Inpatient Plan of Care  Goal: Plan of Care Review  Outcome: Ongoing, Progressing  Goal: Patient-Specific Goal (Individualized)  Outcome: Ongoing, Progressing  Goal: Absence of Hospital-Acquired Illness or Injury  Outcome: Ongoing, Progressing  Goal: Optimal Comfort and Wellbeing  Outcome: Ongoing, Progressing  Goal: Readiness for Transition of Care  Outcome: Ongoing, Progressing     Problem: Infection  Goal: Absence of Infection Signs and Symptoms  Outcome: Ongoing, Progressing     Problem: Fall Injury Risk  Goal: Absence of Fall and Fall-Related Injury  Outcome: Ongoing, Progressing     Problem: Skin Injury Risk Increased  Goal: Skin Health and Integrity  Outcome: Ongoing, Progressing     Problem: Diabetes Comorbidity  Goal: Blood Glucose Level Within Targeted Range  Outcome: Ongoing, Progressing

## 2023-04-28 NOTE — ASSESSMENT & PLAN NOTE
Endocrinology consulted for BG management.   BG goal 140-180    Re-WBD 0.4 units/kg/day  - Levemir (Insulin Detemir) 15 units nightly   - Novolog (Insulin Aspart) 5 units TIDWM and prn for BG excursions LDC SSI (150/50)  - BG checks AC/HS  - Hypoglycemia protocol in place  - If blood glucose greater than 300, please ask patient not to eat food or drink anything other than water until correctional insulin has brought it back below 250    ** Please notify Endocrine for any change and/or advance in diet**  ** Please call Endocrine for any BG related issues **    Discharge Planning:   TBD. Please notify endocrinology prior to discharge.

## 2023-04-28 NOTE — PROGRESS NOTES
[facility]  Colorectal Surgery  Progress Note    Patient Name: Yan Way  MRN: [id number]  Admission Date: 4/26/2023  Hospital Length of Stay: 2 days  Attending Physician: BRISA Maurer MD    Subjective:     Interval History: no acute events overnite, adequate pain control, colostomy funcitonal, no n/v    Post-Op Info:  Procedure(s) (LRB):  PROCTECTOMY, LAPAROSCOPIC, ERAS high (N/A)  SIGMOIDOSCOPY, FLEXIBLE (N/A)   2 Days Post-Op      Medications:  Continuous Infusions:  Scheduled Meds:   acetaminophen  1,000 mg Oral Q8H    busPIRone  7.5 mg Oral BID    cetirizine  10 mg Oral Daily    enoxaparin  40 mg Subcutaneous Daily    fluticasone propionate  2 spray Each Nostril Daily    gabapentin  300 mg Oral TID    ibuprofen  800 mg Oral Q8H    insulin aspart U-100  4 Units Subcutaneous TIDWM    insulin detemir U-100  10 Units Subcutaneous QHS    latanoprost  1 drop Both Eyes QHS    levETIRAcetam  750 mg Oral BID    magnesium chloride  64 mg Oral Daily    mupirocin   Nasal BID    sertraline  100 mg Oral QHS    tamsulosin  0.4 mg Oral Daily     PRN Meds:   dextrose 10%    dextrose 10%    dextrose    dextrose    glucagon (human recombinant)    insulin aspart U-100    ondansetron    oxyCODONE    oxyCODONE    sodium chloride 0.9%    traMADoL        Objective:     Vital Signs (Most Recent):  Temp: 97.8 °F (36.6 °C) (04/28/23 0450)  Pulse: 89 (04/28/23 0450)  Resp: 18 (04/28/23 0450)  BP: 134/64 (04/28/23 0450)  SpO2: 96 % (04/28/23 0450) Vital Signs (24h Range):  Temp:  [97.6 °F (36.4 °C)-99.3 °F (37.4 °C)] 97.8 °F (36.6 °C)  Pulse:  [76-89] 89  Resp:  [17-18] 18  SpO2:  [95 %-99 %] 96 %  BP: (100-134)/(58-65) 134/64     Intake/Output - Last 3 Shifts         04/26 0700 04/27 0659 04/27 0700 04/28 0659 04/28 0700 04/29 0659    Other 230      IV Piggyback 1850 1676.4     Total Intake(mL/kg) 2080 (35.3) 1676.4 (28.4)     Urine (mL/kg/hr) 1150 425 (0.3)     Stool 550 100     Total Output  1700 525     Net +380 +1151.4                    Physical Exam  Vitals and nursing note reviewed.   Constitutional:       Appearance: He is well-developed.   Cardiovascular:      Rate and Rhythm: Normal rate and regular rhythm.      Heart sounds: Normal heart sounds.   Pulmonary:      Effort: Pulmonary effort is normal. No respiratory distress.      Breath sounds: Normal breath sounds. No wheezing or rales.   Abdominal:      General: There is no distension.      Palpations: Abdomen is soft. There is no mass.      Tenderness: There is no abdominal tenderness. There is no guarding.      Comments: Abd inc line healing well  Colosotmy soft brown stool    Musculoskeletal:         General: Normal range of motion.   Skin:     General: Skin is warm and dry.   Neurological:      Mental Status: He is alert and oriented to person, place, and time.   Psychiatric:         Behavior: Behavior normal.         Thought Content: Thought content normal.         Judgment: Judgment normal.       Significant Labs:  BMP (Last 3 Results):   Recent Labs   Lab 04/27/23  0620   *      K 3.8      CO2 22*   BUN 12   CREATININE 1.0   CALCIUM 7.3*   MG 1.5*       CBC (Last 3 Results):   Recent Labs   Lab 04/26/23  1522 04/27/23  0620   WBC  --  9.55   RBC  --  3.41*   HGB  --  8.4*   HCT 27* 27.9*   PLT  --  177   MCV  --  82   MCH  --  24.6*   MCHC  --  30.1*         Significant Diagnostics:  None    Assessment/Plan:     * Rectal adenocarcinoma  OR 4/26 LAR with colostomy    -colostomy functional  -continue multi modality for pain control  -encourage ambulation and use of IS  -april hose and SCD  -prophalactic lovenox and PPI  -consult wound care  -TRICIA coleman today; due to void    Physical deconditioning  From nursing home, basically wheelchair bound  PT/OT  Turn every 2 hours     Type 2 diabetes mellitus with neurologic complication, with long-term current use of insulin  Patient's FSGs are uncontrolled due to hyperglycemia on  current medication regimen.  Last A1c reviewed- No results found for: LABA1C, HGBA1C  Most recent fingerstick glucose reviewed- Recent Labs   Lab 04/26/23  1615 04/26/23  2303   POCTGLUCOSE 182* 197*     Current correctional scale  High  Maintain anti-hyperglycemic dose as follows-   Antihyperglycemics (From admission, onward)    Start     Stop Route Frequency Ordered    04/26/23 1645  insulin aspart U-100 pen 1-10 Units         -- SubQ Before meals & nightly PRN 04/26/23 1553        Hold Oral hypoglycemics while patient is in the hospital.    Consult endocrine           ALBAN REAGAN MD  Colorectal Surgery  [facility]  [facility]

## 2023-04-29 VITALS
TEMPERATURE: 98 F | HEIGHT: 64 IN | OXYGEN SATURATION: 93 % | DIASTOLIC BLOOD PRESSURE: 62 MMHG | RESPIRATION RATE: 18 BRPM | HEART RATE: 83 BPM | BODY MASS INDEX: 22.2 KG/M2 | SYSTOLIC BLOOD PRESSURE: 110 MMHG | WEIGHT: 130 LBS

## 2023-04-29 LAB
CRP SERPL-MCNC: 158.3 MG/L (ref 0–8.2)
POCT GLUCOSE: 197 MG/DL (ref 70–110)
POCT GLUCOSE: 219 MG/DL (ref 70–110)
POCT GLUCOSE: 266 MG/DL (ref 70–110)
POCT GLUCOSE: 293 MG/DL (ref 70–110)
POCT GLUCOSE: 330 MG/DL (ref 70–110)

## 2023-04-29 PROCEDURE — 63600175 PHARM REV CODE 636 W HCPCS: Performed by: STUDENT IN AN ORGANIZED HEALTH CARE EDUCATION/TRAINING PROGRAM

## 2023-04-29 PROCEDURE — 36415 COLL VENOUS BLD VENIPUNCTURE: CPT | Performed by: STUDENT IN AN ORGANIZED HEALTH CARE EDUCATION/TRAINING PROGRAM

## 2023-04-29 PROCEDURE — 25000003 PHARM REV CODE 250: Performed by: NURSE PRACTITIONER

## 2023-04-29 PROCEDURE — 99232 PR SUBSEQUENT HOSPITAL CARE,LEVL II: ICD-10-PCS | Mod: ,,, | Performed by: NURSE PRACTITIONER

## 2023-04-29 PROCEDURE — 86140 C-REACTIVE PROTEIN: CPT | Performed by: STUDENT IN AN ORGANIZED HEALTH CARE EDUCATION/TRAINING PROGRAM

## 2023-04-29 PROCEDURE — 63600175 PHARM REV CODE 636 W HCPCS: Performed by: NURSE PRACTITIONER

## 2023-04-29 PROCEDURE — 25000003 PHARM REV CODE 250: Performed by: STUDENT IN AN ORGANIZED HEALTH CARE EDUCATION/TRAINING PROGRAM

## 2023-04-29 PROCEDURE — 99232 SBSQ HOSP IP/OBS MODERATE 35: CPT | Mod: ,,, | Performed by: NURSE PRACTITIONER

## 2023-04-29 RX ORDER — DEXTROMETHORPHAN HYDROBROMIDE, GUAIFENESIN 5; 100 MG/5ML; MG/5ML
650 LIQUID ORAL EVERY 8 HOURS
Qty: 40 TABLET | Refills: 3 | Status: SHIPPED | OUTPATIENT
Start: 2023-04-29

## 2023-04-29 RX ORDER — OXYCODONE HYDROCHLORIDE 5 MG/1
5 TABLET ORAL EVERY 4 HOURS PRN
Qty: 10 TABLET | Refills: 0 | Status: SHIPPED | OUTPATIENT
Start: 2023-04-29

## 2023-04-29 RX ORDER — GABAPENTIN 300 MG/1
300 CAPSULE ORAL 3 TIMES DAILY
Qty: 42 CAPSULE | Refills: 0 | Status: SHIPPED | OUTPATIENT
Start: 2023-04-29 | End: 2023-04-29

## 2023-04-29 RX ORDER — IBUPROFEN 800 MG/1
800 TABLET ORAL EVERY 8 HOURS PRN
Qty: 30 TABLET | Refills: 0 | Status: SHIPPED | OUTPATIENT
Start: 2023-04-29

## 2023-04-29 RX ORDER — POLYETHYLENE GLYCOL 3350 17 G/17G
17 POWDER, FOR SOLUTION ORAL DAILY PRN
Qty: 714 G | Refills: 0 | Status: SHIPPED | OUTPATIENT
Start: 2023-04-29 | End: 2023-04-29 | Stop reason: SDUPTHER

## 2023-04-29 RX ORDER — INSULIN ASPART 100 [IU]/ML
1-10 INJECTION, SOLUTION INTRAVENOUS; SUBCUTANEOUS
Status: DISCONTINUED | OUTPATIENT
Start: 2023-04-29 | End: 2023-04-29 | Stop reason: HOSPADM

## 2023-04-29 RX ORDER — OXYCODONE HYDROCHLORIDE 5 MG/1
5 TABLET ORAL EVERY 4 HOURS PRN
Qty: 10 TABLET | Refills: 0 | Status: SHIPPED | OUTPATIENT
Start: 2023-04-29 | End: 2023-04-29

## 2023-04-29 RX ORDER — ACETAMINOPHEN 500 MG
1000 TABLET ORAL EVERY 8 HOURS
Qty: 40 TABLET | Refills: 0 | Status: SHIPPED | OUTPATIENT
Start: 2023-04-29 | End: 2023-04-29

## 2023-04-29 RX ORDER — IBUPROFEN 800 MG/1
800 TABLET ORAL EVERY 8 HOURS PRN
Qty: 30 TABLET | Refills: 0 | Status: SHIPPED | OUTPATIENT
Start: 2023-04-29 | End: 2023-04-29

## 2023-04-29 RX ORDER — POLYETHYLENE GLYCOL 3350 17 G/17G
17 POWDER, FOR SOLUTION ORAL DAILY PRN
Qty: 714 G | Refills: 0 | Status: SHIPPED | OUTPATIENT
Start: 2023-04-29

## 2023-04-29 RX ORDER — OXYCODONE HYDROCHLORIDE 5 MG/1
5 TABLET ORAL EVERY 4 HOURS PRN
Qty: 10 TABLET | Refills: 0 | Status: SHIPPED | OUTPATIENT
Start: 2023-04-29 | End: 2023-04-29 | Stop reason: SDUPTHER

## 2023-04-29 RX ORDER — GABAPENTIN 300 MG/1
300 CAPSULE ORAL 3 TIMES DAILY
Qty: 42 CAPSULE | Refills: 0 | Status: SHIPPED | OUTPATIENT
Start: 2023-04-29 | End: 2023-05-13

## 2023-04-29 RX ADMIN — IBUPROFEN 800 MG: 400 TABLET, FILM COATED ORAL at 05:04

## 2023-04-29 RX ADMIN — GABAPENTIN 300 MG: 300 CAPSULE ORAL at 08:04

## 2023-04-29 RX ADMIN — INSULIN ASPART 5 UNITS: 100 INJECTION, SOLUTION INTRAVENOUS; SUBCUTANEOUS at 05:04

## 2023-04-29 RX ADMIN — MAGNESIUM 64 MG (MAGNESIUM CHLORIDE) TABLET,DELAYED RELEASE 64 MG: at 08:04

## 2023-04-29 RX ADMIN — GABAPENTIN 300 MG: 300 CAPSULE ORAL at 02:04

## 2023-04-29 RX ADMIN — INSULIN ASPART 2 UNITS: 100 INJECTION, SOLUTION INTRAVENOUS; SUBCUTANEOUS at 05:04

## 2023-04-29 RX ADMIN — TAMSULOSIN HYDROCHLORIDE 0.4 MG: 0.4 CAPSULE ORAL at 08:04

## 2023-04-29 RX ADMIN — IBUPROFEN 800 MG: 400 TABLET, FILM COATED ORAL at 02:04

## 2023-04-29 RX ADMIN — INSULIN ASPART 8 UNITS: 100 INJECTION, SOLUTION INTRAVENOUS; SUBCUTANEOUS at 08:04

## 2023-04-29 RX ADMIN — INSULIN ASPART 5 UNITS: 100 INJECTION, SOLUTION INTRAVENOUS; SUBCUTANEOUS at 12:04

## 2023-04-29 RX ADMIN — FLUTICASONE PROPIONATE 100 MCG: 50 SPRAY, METERED NASAL at 08:04

## 2023-04-29 RX ADMIN — INSULIN ASPART 5 UNITS: 100 INJECTION, SOLUTION INTRAVENOUS; SUBCUTANEOUS at 08:04

## 2023-04-29 RX ADMIN — ACETAMINOPHEN 1000 MG: 500 TABLET ORAL at 02:04

## 2023-04-29 RX ADMIN — BUSPIRONE HYDROCHLORIDE 7.5 MG: 5 TABLET ORAL at 08:04

## 2023-04-29 RX ADMIN — INSULIN ASPART 6 UNITS: 100 INJECTION, SOLUTION INTRAVENOUS; SUBCUTANEOUS at 12:04

## 2023-04-29 RX ADMIN — LEVETIRACETAM 750 MG: 750 TABLET, FILM COATED ORAL at 08:04

## 2023-04-29 RX ADMIN — CETIRIZINE HYDROCHLORIDE 10 MG: 10 TABLET, FILM COATED ORAL at 08:04

## 2023-04-29 RX ADMIN — ENOXAPARIN SODIUM 40 MG: 40 INJECTION SUBCUTANEOUS at 05:04

## 2023-04-29 RX ADMIN — ACETAMINOPHEN 1000 MG: 500 TABLET ORAL at 05:04

## 2023-04-29 RX ADMIN — MUPIROCIN: 20 OINTMENT TOPICAL at 08:04

## 2023-04-29 NOTE — DISCHARGE INSTRUCTIONS
"Discharge Instructions After Abdominal Operation     Pain Control: It is expected to have pain after surgery, but this should improve over the next several weeks. You may be prescribed a narcotic pain medication on discharge. You can take this medication as prescribed if the pain is severe but try to decrease the frequency at which you use the narcotic over the initial two (2) weeks.  You may find you need less narcotic pain medication if you combine or stagger it with Tylenol® (acetaminophen) and/or Advil® (ibuprofen). For example, you may take Tylenol 1000mg, then take Advil 600mg 3 hours later, then repeat Tylenol 3 hours after the Advil, and so on.  You should not take more than 4000 mg of Tylenol® or 3200 mg of Advil® in a 24-hour period.    Wound Care: The incisions can be left open to air unless there is drainage, in which case you should cover the wound with a dry, clean bandage or piece of gauze. Change the dressing 1-2 times each day as needed to maintain a relatively dry dressing. You may have "skin glue" covering your incisions which will peel/crumble off on its own over the next week or two. Staples are removed in 2-3 weeks by a nurse or physician.  You should shower every day without a dressing on the incisions and let the water run over the incisions. Do not soak in a bathtub, hot tub or pool until the incisions are completely healed, which usually takes ~4-6 weeks.    Bowel Function: Diarrhea and loose stool are normal and expected after intestinal surgery. Bowel function initially tends to be erratic (increased frequency, gas, liquid/loose consistency, seepage, urgency), but it will improve over the next several months as your body adjusts to the surgical changes.    Diet: Unless directed otherwise by your surgeon, after surgery you should do the following:  Eat several (4-6) small meals each day.  If you experience difficulty eating, add in supplemental drinks (Boost®, Ensure®, Glucerna®).  If you " are having loose stools, your diet should include foods to add bulk to the stool such as applesauce, bananas, cheese, peanut butter, pasta, and potatoes.    Activity: Walking is encouraged after surgery. Light aerobic activity such as climbing stairs or leisurely bike riding is acceptable but listen to your body and let pain be your guide when reintroducing activity. If it hurts, don't do it. Avoid the following activities for six (6) weeks after surgery:  Lifting objects over 10 pounds  Strenuous activity such as press-ups, push-ups, crunches, sit-ups, vigorous pulling/pushing, and repetitive twisting or bending    Potential Problems:   Bowel obstruction or ileus is characterized by persistent abdominal cramps, bloating, constipation, nausea, or vomiting. If the symptoms are mild, you should restrict your dietary intake to only liquids, and avoid solid food for 2-3 days. If the symptoms are more severe or persist beyond 24 hours, please call your surgeon's office for advice.    Dehydration can commonly occur, and its symptoms or signs include dark urine, dizziness when standing, dry mouth, increased heart rate, leg cramps, and low volumes (less than 800 ml) of urine. If these occur, you should immediately call your surgeon's office. To avoid dehydration, you should do the following:  Drink a variety of fluids. Use an oral rehydration salt solution as listed in the attachment below and sip the solution between meals.  Eat salty foods or add salt to your food.  Use an anti-diarrheal medication or bulking agent as previously instructed by your surgeon.     Wound infection can occur after any surgery and is characterized by increased drainage of cloudy fluid, odor, pain around the wound, redness of the skin around the wound extending 2-3 fingerbreadths outward, or temperature greater than 101 degrees. Please immediately call your surgeon's office if you begin experiencing these symptoms or signs.

## 2023-04-29 NOTE — NURSING
Coleman removed on pt during dayshift.  Family at bedside stated that he has not urinated since coleman was removed.  Family stated pt had not had much to drink, had pt to drink pitcher of water around 2100 during med pass to help with urination.  Family stated he still had not gone.  Bladder scanned pt, smallest amount 175 and greatest amount 291.  Urine noted in colostomy bag.  Paged Dr. August, on call surgery resident about bladder scan results and urine in colostomy bag.  Said pt only put out about 400ml with coleman during 12 hour shift.  She stated to bladder scan again in one hour, asked how much urine was in colostomy bag, informed her it was maybe 50-100ml unmeasured.  No new orders concerning urine in colostomy.

## 2023-04-29 NOTE — SUBJECTIVE & OBJECTIVE
Subjective:     Interval History: NAEON. Afebrile, HDS. Denies N/V. Gas in ostomy appliance. No stool yet. Tolerating diet.    Post-Op Info:  Procedure(s) (LRB):  PROCTECTOMY, LAPAROSCOPIC, ERAS high (N/A)  SIGMOIDOSCOPY, FLEXIBLE (N/A)   3 Days Post-Op      Medications:  Continuous Infusions:  Scheduled Meds:   acetaminophen  1,000 mg Oral Q8H    busPIRone  7.5 mg Oral BID    cetirizine  10 mg Oral Daily    enoxaparin  40 mg Subcutaneous Daily    fluticasone propionate  2 spray Each Nostril Daily    gabapentin  300 mg Oral TID    ibuprofen  800 mg Oral Q8H    insulin aspart U-100  5 Units Subcutaneous TIDWM    insulin detemir U-100  15 Units Subcutaneous QHS    latanoprost  1 drop Both Eyes QHS    levETIRAcetam  750 mg Oral BID    magnesium chloride  64 mg Oral Daily    mupirocin   Nasal BID    sertraline  100 mg Oral QHS    tamsulosin  0.4 mg Oral Daily     PRN Meds:   dextrose 10%    dextrose 10%    dextrose    dextrose    glucagon (human recombinant)    insulin aspart U-100    ondansetron    oxyCODONE    oxyCODONE    sodium chloride 0.9%    traMADoL        Objective:     Vital Signs (Most Recent):  Temp: 98 °F (36.7 °C) (04/29/23 0816)  Pulse: 82 (04/29/23 0816)  Resp: 20 (04/29/23 0816)  BP: 136/64 (04/29/23 0816)  SpO2: (!) 92 % (04/29/23 0816)   Vital Signs (24h Range):  Temp:  [98 °F (36.7 °C)-98.7 °F (37.1 °C)] 98 °F (36.7 °C)  Pulse:  [82-97] 82  Resp:  [16-20] 20  SpO2:  [92 %-96 %] 92 %  BP: (119-143)/(61-82) 136/64     Intake/Output - Last 3 Shifts         04/27 0700  04/28 0659 04/28 0700  04/29 0659 04/29 0700 04/30 0659    P.O.  800     Other       IV Piggyback 1676.4      Total Intake(mL/kg) 1676.4 (28.4) 800 (13.6)     Urine (mL/kg/hr) 425 (0.3) 350 (0.2)     Stool 100 100     Total Output 525 450     Net +1151.4 +350            Urine Occurrence  1 x           Physical Exam  Vitals and nursing note reviewed.   Constitutional:       Appearance: He is well-developed.   Cardiovascular:      Rate  and Rhythm: Normal rate and regular rhythm.      Heart sounds: Normal heart sounds.   Pulmonary:      Effort: Pulmonary effort is normal. No respiratory distress.      Breath sounds: Normal breath sounds. No wheezing or rales.   Abdominal:      General: There is no distension.      Palpations: Abdomen is soft. There is no mass.      Tenderness: There is no abdominal tenderness. There is no guarding.      Comments: Abd inc line healing well  Colostomy with flatus in ostomy appliance  Musculoskeletal:         General: Normal range of motion.   Skin:     General: Skin is warm and dry.   Neurological:      Mental Status: He is alert and oriented to person, place, and time.   Psychiatric:         Behavior: Behavior normal.         Thought Content: Thought content normal.         Judgment: Judgment normal.     Significant Labs:  BMP (Last 3 Results):   Recent Labs   Lab 04/27/23  0620   *      K 3.8      CO2 22*   BUN 12   CREATININE 1.0   CALCIUM 7.3*   MG 1.5*     CBC (Last 3 Results):   Recent Labs   Lab 04/26/23  1522 04/27/23  0620   WBC  --  9.55   RBC  --  3.41*   HGB  --  8.4*   HCT 27* 27.9*   PLT  --  177   MCV  --  82   MCH  --  24.6*   MCHC  --  30.1*       Significant Diagnostics:  None

## 2023-04-29 NOTE — NURSING
"Pt bladder scanned again as ordered.  Pt 321ml.  Paged on call for new orders.  Said to "give it another hour and see how he does."  "

## 2023-04-29 NOTE — PROGRESS NOTES
[facility]  Colorectal Surgery  Progress Note    Patient Name: Yan Way  MRN: [id number]  Admission Date: 4/26/2023  Hospital Length of Stay: 3 days  Attending Physician: BRISA Maurer MD    Subjective:     Interval History: NAEON. Afebrile, HDS. Denies N/V. Gas in ostomy appliance. Tolerating diet.    Post-Op Info:  Procedure(s) (LRB):  PROCTECTOMY, LAPAROSCOPIC, ERAS high (N/A)  SIGMOIDOSCOPY, FLEXIBLE (N/A)   3 Days Post-Op      Medications:  Continuous Infusions:  Scheduled Meds:   acetaminophen  1,000 mg Oral Q8H    busPIRone  7.5 mg Oral BID    cetirizine  10 mg Oral Daily    enoxaparin  40 mg Subcutaneous Daily    fluticasone propionate  2 spray Each Nostril Daily    gabapentin  300 mg Oral TID    ibuprofen  800 mg Oral Q8H    insulin aspart U-100  5 Units Subcutaneous TIDWM    insulin detemir U-100  15 Units Subcutaneous QHS    latanoprost  1 drop Both Eyes QHS    levETIRAcetam  750 mg Oral BID    magnesium chloride  64 mg Oral Daily    mupirocin   Nasal BID    sertraline  100 mg Oral QHS    tamsulosin  0.4 mg Oral Daily     PRN Meds:   dextrose 10%    dextrose 10%    dextrose    dextrose    glucagon (human recombinant)    insulin aspart U-100    ondansetron    oxyCODONE    oxyCODONE    sodium chloride 0.9%    traMADoL        Objective:     Vital Signs (Most Recent):  Temp: 98 °F (36.7 °C) (04/29/23 0816)  Pulse: 82 (04/29/23 0816)  Resp: 20 (04/29/23 0816)  BP: 136/64 (04/29/23 0816)  SpO2: (!) 92 % (04/29/23 0816)   Vital Signs (24h Range):  Temp:  [98 °F (36.7 °C)-98.7 °F (37.1 °C)] 98 °F (36.7 °C)  Pulse:  [82-97] 82  Resp:  [16-20] 20  SpO2:  [92 %-96 %] 92 %  BP: (119-143)/(61-82) 136/64     Intake/Output - Last 3 Shifts         04/27 0700  04/28 0659 04/28 0700  04/29 0659 04/29 0700 04/30 0659    P.O.  800     Other       IV Piggyback 1676.4      Total Intake(mL/kg) 1676.4 (28.4) 800 (13.6)     Urine (mL/kg/hr) 425 (0.3) 350 (0.2)     Stool 100 100      Total Output 525 450     Net +1151.4 +350            Urine Occurrence  1 x           Physical Exam  Vitals and nursing note reviewed.   Constitutional:       Appearance: He is well-developed.   Cardiovascular:      Rate and Rhythm: Normal rate and regular rhythm.      Heart sounds: Normal heart sounds.   Pulmonary:      Effort: Pulmonary effort is normal. No respiratory distress.      Breath sounds: Normal breath sounds. No wheezing or rales.   Abdominal:      General: There is no distension.      Palpations: Abdomen is soft. There is no mass.      Tenderness: There is no abdominal tenderness. There is no guarding.      Comments: Abd inc line healing well  Colostomy with flatus in ostomy appliance  Musculoskeletal:         General: Normal range of motion.   Skin:     General: Skin is warm and dry.   Neurological:      Mental Status: He is alert and oriented to person, place, and time.   Psychiatric:         Behavior: Behavior normal.         Thought Content: Thought content normal.         Judgment: Judgment normal.     Significant Labs:  BMP (Last 3 Results):   Recent Labs   Lab 04/27/23  0620   *      K 3.8      CO2 22*   BUN 12   CREATININE 1.0   CALCIUM 7.3*   MG 1.5*     CBC (Last 3 Results):   Recent Labs   Lab 04/26/23  1522 04/27/23  0620   WBC  --  9.55   RBC  --  3.41*   HGB  --  8.4*   HCT 27* 27.9*   PLT  --  177   MCV  --  82   MCH  --  24.6*   MCHC  --  30.1*       Significant Diagnostics:  None    Assessment/Plan:     * Rectal adenocarcinoma  OR 4/26 LAR with colostomy    -colostomy functional  -continue multi modality for pain control  -encourage ambulation and use of IS  -april hose and SCD  -prophalactic lovenox and PPI  -endocrine following for blood glucose management    Dispo: home pending social work set up    Physical deconditioning  From nursing home, basically wheelchair bound  PT/OT  Turn every 2 hours     Type 2 diabetes mellitus with neurologic complication, with  long-term current use of insulin  Patient's FSGs are uncontrolled due to hyperglycemia on current medication regimen.  Last A1c reviewed- No results found for: LABA1C, HGBA1C  Most recent fingerstick glucose reviewed- Recent Labs   Lab 04/26/23  1615 04/26/23  2303   POCTGLUCOSE 182* 197*     Current correctional scale  High  Maintain anti-hyperglycemic dose as follows-   Antihyperglycemics (From admission, onward)    Start     Stop Route Frequency Ordered    04/26/23 1645  insulin aspart U-100 pen 1-10 Units         -- SubQ Before meals & nightly PRN 04/26/23 1553        Hold Oral hypoglycemics while patient is in the hospital.    Consult endocrine           Arabella Estevez MD  Colorectal Surgery  [facility]

## 2023-04-29 NOTE — PLAN OF CARE
Per Chela report can be called at 5 pm to [phone] . Transport has been arranged for 5 pm pickup. Pts nurse updated.    04/29/23 5647   Post-Acute Status   Post-Acute Authorization Placement   Post-Acute Placement Status Set-up Complete/Auth obtained
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
## 2023-04-29 NOTE — PLAN OF CARE
Problem: Adult Inpatient Plan of Care  Goal: Plan of Care Review  Outcome: Ongoing, Progressing  Goal: Patient-Specific Goal (Individualized)  Outcome: Ongoing, Progressing  Goal: Absence of Hospital-Acquired Illness or Injury  Outcome: Ongoing, Progressing     Problem: Infection  Goal: Absence of Infection Signs and Symptoms  Outcome: Ongoing, Progressing     Problem: Fall Injury Risk  Goal: Absence of Fall and Fall-Related Injury  Outcome: Ongoing, Progressing     Problem: Skin Injury Risk Increased  Goal: Skin Health and Integrity  Outcome: Ongoing, Progressing     Problem: Diabetes Comorbidity  Goal: Blood Glucose Level Within Targeted Range  Outcome: Ongoing, Progressing

## 2023-04-29 NOTE — ASSESSMENT & PLAN NOTE
Endocrinology consulted for BG management.   BG goal 140-180    - Levemir (Insulin Detemir) 15 units nightly   - Novolog (Insulin Aspart) 5 units TIDWM and prn for BG excursions [facility] [place]150/25) ([place] [facility] [place] [facility] [place] BG control).   - BG checks AC/HS  - Hypoglycemia protocol in place  - If blood glucose greater than 300, please ask patient not to eat food or drink anything other than water until correctional insulin has brought it back below 250    ** Please notify Endocrine for any change and/or advance in diet**  ** Please call Endocrine for any BG related issues **    Discharge Planning:   TBD. Please notify endocrinology prior to discharge.

## 2023-04-29 NOTE — PLAN OF CARE
TOY met with pts brother at bedside and confirmed that he does not have the pts prescriptions and they have not brought them yet. TOY sent a secure chat to the medical team asking them to cancel [place] [facility] [place] them to [place], [place] [facility] [place] [facility].  I also confirmed that the brother does not need transport back, only the pt. CM following.      TOY updated Chela at [phone] that medications are being sent to [facility] and transport has been arranged for 5 pm. She stated that report can be called at that time.  TOY updated nursing staff    04/29/23 1440   Discharge Assessment   Assessment Type Discharge Planning Reassessment

## 2023-04-29 NOTE — PLAN OF CARE
Pt resting comfortable in bed. Brother at bedside awaiting discharge transport. VSS and all questions answered.     Problem: Adult Inpatient Plan of Care  Goal: Plan of Care Review  Outcome: Ongoing, Progressing  Goal: Patient-Specific Goal (Individualized)  Outcome: Ongoing, Progressing  Goal: Absence of Hospital-Acquired Illness or Injury  Outcome: Ongoing, Progressing  Goal: Optimal Comfort and Wellbeing  Outcome: Ongoing, Progressing  Goal: Readiness for Transition of Care  Outcome: Ongoing, Progressing     Problem: Infection  Goal: Absence of Infection Signs and Symptoms  Outcome: Ongoing, Progressing     Problem: Fall Injury Risk  Goal: Absence of Fall and Fall-Related Injury  Outcome: Ongoing, Progressing     Problem: Skin Injury Risk Increased  Goal: Skin Health and Integrity  Outcome: Ongoing, Progressing     Problem: Diabetes Comorbidity  Goal: Blood Glucose Level Within Targeted Range  Outcome: Ongoing, Progressing

## 2023-04-29 NOTE — PLAN OF CARE
Facility transfer orders sent to [facility] at [phone]- I confirmed with Chela at [phone] that they do not have transport and we will have to arrange transport back to their facility located at [address] [place] [address] [place] [address]. CM following to obtain report information .     12:54- CM spoke to Chela at [phone] and she did receive the first faxed packet of information but is still awaiting the discharge orders and nursing facility transfer orders. CM following for report information to set up transport.     1:49- Chela has received dc orders but has questions about where the pts prescriptions where prescribed to.     CM spoke to nursing staff and pts brother went downstairs to get pts medications. If medications are unable to be obtained then they need to be escribed to [facility].     CM set up transport via ADT 30 for 5:00 pm transport.    04/29/23 1224   Post-Acute Status   Post-Acute Authorization Placement   Post-Acute Placement Status Pending payor medical review/second level review

## 2023-04-29 NOTE — PLAN OF CARE
The pt is cleared to return to [facility] in [place], [place]  from case management.    04/29/23 1456   Final Note   Assessment Type Final Discharge Note   Anticipated Discharge Disposition [facility]   Post-Acute Status   Post-Acute Authorization Placement   Post-Acute Placement Status Set-up Complete/Auth obtained   Discharge Delays (!) Ambulance Transport/Facility Transport

## 2023-04-29 NOTE — PLAN OF CARE
Discharge orders manually faxed to [facility] at [phone]. CM following .    04/29/23 1151   Post-Acute Status   Post-Acute Authorization Placement   Post-Acute Placement Status Pending payor medical review/second level review

## 2023-04-29 NOTE — PLAN OF CARE
NURSING HOME ORDERS    04/29/2023  [facility]  [address]  [facility] [address]  Dept: [phone]  Loc: [phone]     Admit to Nursing Home:  Home or Self Care    Diagnoses:  Active Hospital Problems    Diagnosis  POA    *Rectal adenocarcinoma [C20]  Yes    Type 2 diabetes mellitus with neurologic complication, with long-term current use of insulin [E11.49, Z79.4]  Not Applicable    Physical deconditioning [R53.81]  Yes      Resolved Hospital Problems   No resolved problems to display.       Patient is homebound due to:  Rectal adenocarcinoma    Allergies:  Review of patient's allergies indicates:   Allergen Reactions    Latex, natural rubber Anxiety       Vitals:  Routine    Diet: diabetic diet: 1800 calorie    Activities:   Avoid heavy lifting    Goals of Care Treatment Preferences:         Labs:  None    Nursing Precautions:  Fall    Consults:   PT to evaluate and treat- 3 times a week, OT to evaluate and treat- 3 times a week, and Wound Care for colostomy care    Miscellaneous Care: Colostomy Care:  Empty bag every shift and prn                   Diabetes Care:  SN to perform and educate Diabetic management with blood glucose monitoring: and Report CBG < 60 or > 350 to physician.      Medications: Discontinue all previous medication orders, if any. See new list below.     Medication List        START taking these medications      acetaminophen 500 MG tablet  Commonly known as: TYLENOL  Take 2 tablets (1,000 mg total) by mouth every 8 (eight) hours.     gabapentin 300 MG capsule  Commonly known as: NEURONTIN  Take 1 capsule (300 mg total) by mouth 3 (three) times daily. for 14 days     ibuprofen 800 MG tablet  Commonly known as: ADVIL,MOTRIN  Take 1 tablet (800 mg total) by mouth every 8 (eight) hours as needed for Other (Pain).     oxyCODONE 5 MG immediate release tablet  Commonly known as: ROXICODONE  Take 1 tablet (5 mg total) by mouth every 4 (four) hours  as needed for Pain.            CHANGE how you take these medications      polyethylene glycol 17 gram/dose powder  Commonly known as: GLYCOLAX  Use cap to measure 17g, mix with liquid, and take by mouth daily as needed (Constipation).  What changed:   when to take this  reasons to take this  Another medication with the same name was removed. Continue taking this medication, and follow the directions you see here.            CONTINUE taking these medications      busPIRone 7.5 MG tablet  Commonly known as: BUSPAR  Take 7.5 mg by mouth 2 (two) times a day.     clobetasoL 0.05 % lotion  Commonly known as: CLOBEX  Apply topically.     dorzolamide-timolol 2-0.5% 22.3-6.8 mg/mL ophthalmic solution  Commonly known as: COSOPT  Place into both eyes.     fluticasone propionate 50 mcg/actuation nasal spray  Commonly known as: FLONASE  by Each Nostril route.     insulin NPH-insulin regular (70/30) 100 unit/mL (70-30) injection  36 Units in AM and 26 Units in PM     latanoprost 0.005 % ophthalmic solution  Apply 1 drop to eye every evening.     levETIRAcetam 750 MG Tab  Commonly known as: KEPPRA  Take 750 mg by mouth 2 (two) times daily.     loratadine 10 mg tablet  Commonly known as: CLARITIN  Take 10 mg by mouth.     magnesium chloride 64 mg Tbec  Commonly known as: MAG 64  Take 1 tablet by mouth once daily.     metFORMIN 500 MG tablet  Commonly known as: GLUCOPHAGE  Take 500 mg by mouth 2 (two) times daily with meals.     nystatin powder  Commonly known as: MYCOSTATIN  nystatin 100,000 unit/gram topical powder   APPLY TO THE AFFECTED AREA(S) BY TOPICAL ROUTE 2 TIMES PER DAY UNTIL HEALED     phenylephrine-DM-guaiFENesin 5- mg/5 mL Liqd  Take by mouth.     salicylic acid 2 % Crea  2 g.     sertraline 50 MG tablet  Commonly known as: ZOLOFT  Take 100 mg by mouth every evening.     tamsulosin 0.4 mg Cap  Commonly known as: FLOMAX  Take 0.4 mg by mouth.            STOP taking these medications      polyethylene glycol  236-22.74-6.74 -5.86 gram suspension  Commonly known as: GoLYTELY                Immunizations Administered as of 4/29/2023       Name Date Dose VIS Date Route Exp Date    COVID-19, MRNA, LN-S, PF (Moderna) 4/13/2022 0.25 mL -- -- --    Lot: [id number]     External: Auto Reconciled From Outside Source     COVID-19, MRNA, LN-S, PF (Pfizer) (Purple Cap) 10/12/2021 0.3 mL -- -- --    Lot: [id number]     External: Auto Reconciled From Outside Source     COVID-19, MRNA, LN-S, PF (Pfizer) (Purple Cap) 2/17/2021 0.3 mL -- -- --    Lot: [id number]     External: Auto Reconciled From Outside Source     COVID-19, MRNA, LN-S, PF (Pfizer) (Purple Cap) 1/6/2021 0.3 mL -- -- --    Lot: [id number]     External: Auto Reconciled From Outside Source             This patient has had both covid vaccinations    Some patients may experience side effects after vaccination.  These may include fever, headache, muscle or joint aches.  Most symptoms resolve with 24-48 hours and do not require urgent medical evaluation unless they persist for more than 72 hours or symptoms are concerning for an unrelated medical condition.          _________________________________  KASHIF ASIF MD  04/29/2023

## 2023-04-29 NOTE — PLAN OF CARE
TOY received message that the pt is ready for discahrge back to his correction nursing home placement. TOY called the [facility] [place] at [phone] and there was not an answer. TOY sent updated clinicals and MAR via [facility] to update them of pts anticipated return. CM following for a response.    TOY spoke to Chela- Supervisor at the [facility] and she asked me to fax her discharge orders to [phone] for review. TOY requested orders .     04/29/23 1010   Post-Acute Status   Post-Acute Authorization Placement   Post-Acute Placement Status Referrals Sent

## 2023-04-29 NOTE — NURSING
Pt bladder scanned.  During bladder scan pt urinated.  Unable to obtain amount.  Pad soaked, sheets soaked.  Bedding changed.  Pt belly distended.

## 2023-04-29 NOTE — SUBJECTIVE & OBJECTIVE
"Interval HPI:   Overnight events: No acute events overnight. Patient in room 1001/1001 A. Blood glucose stable. BG at and above goal on current insulin regimen (SSI, prandial, and basal insulin ). Steroid use- None. 3 Days Post-Op  Renal function- Normal   Vasopressors-  None       Endocrine will continue to follow and manage insulin orders inpatient.         Diet diabetic [facility];  Calorie     Eatin%  Nausea: No  Hypoglycemia and intervention: No  Fever: No  TPN and/or TF: No      /64 (Patient Position: Lying)   Pulse 82   Temp 98 °F (36.7 °C) (Oral)   Resp 20   Ht 5' 4" (1.626 m)   Wt 59 kg (130 lb)   SpO2 (!) 92%   BMI 22.31 kg/m²     Labs Reviewed and Include    No results for input(s): GLU, CALCIUM, ALBUMIN, PROT, NA, K, CO2, CL, BUN, CREATININE, ALKPHOS, ALT, AST, BILITOT in the last 24 hours.  Lab Results   Component Value Date    WBC 9.55 2023    HGB 8.4 (L) 2023    HCT 27.9 (L) 2023    MCV 82 2023     2023     No results for input(s): TSH, FREET4 in the last 168 hours.  Lab Results   Component Value Date    HGBA1C 6.8 (H) 2023       Nutritional status:   Body mass index is 22.31 kg/m².  Lab Results   Component Value Date    ALBUMIN 3.5 2023     No results found for: PREALBUMIN    Estimated Creatinine Clearance: 59.8 mL/min (based on SCr of 1 mg/dL).    Accu-Checks  Recent Labs     23  2303 23  1140 23  1604 23  2208 23  0806 23  1235 23  1603 23  2207 23  0017 23  0832   POCTGLUCOSE 197* 231* 205* 233* 264* 200* 206* 219* 266* 330*       Current Medications and/or Treatments Impacting Glycemic Control  Immunotherapy:    Immunosuppressants       None          Steroids:   Hormones (From admission, onward)      None          Pressors:    Autonomic Drugs (From admission, onward)      None          Hyperglycemia/Diabetes Medications:   Antihyperglycemics (From admission, " onward)      Start     Stop Route Frequency Ordered    04/29/23 0934  insulin aspart U-100 pen 1-10 Units         -- SubQ Before meals & nightly PRN 04/29/23 0834    04/28/23 2100  insulin detemir U-100 (Levemir) pen 15 Units         -- SubQ Nightly 04/28/23 0840    04/28/23 1130  insulin aspart U-100 pen 5 Units         -- SubQ 3 times daily with meals 04/28/23 0840

## 2023-04-29 NOTE — ASSESSMENT & PLAN NOTE
OR 4/26 LAR with colostomy    -colostomy functional  -continue multi modality for pain control  -encourage ambulation and use of IS  -april hose and SCD  -prophalactic lovenox and PPI  -endocrine following for blood glucose management    Dispo: home pending social work set up

## 2023-04-30 NOTE — NURSING
Pt discharged via stretcher per ambulance services to [place], [place]. Pt and brother provided with updates and discharge information from previous staff nurse JOSE More.
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
## 2023-05-02 ENCOUNTER — TELEPHONE (OUTPATIENT)
Dept: SURGERY | Facility: CLINIC | Age: 68
End: 2023-05-02
Payer: MEDICARE

## 2023-05-02 ENCOUNTER — HOSPITAL ENCOUNTER (INPATIENT)
Facility: HOSPITAL | Age: 68
LOS: 9 days | Discharge: NURSING, REHAB OR LONG-TERM CARE | DRG: 389 | End: 2023-05-11
Attending: COLON & RECTAL SURGERY | Admitting: COLON & RECTAL SURGERY
Payer: MEDICARE

## 2023-05-02 DIAGNOSIS — R14.0 ABDOMINAL DISTENTION: ICD-10-CM

## 2023-05-02 DIAGNOSIS — K56.609 SMALL BOWEL OBSTRUCTION: Primary | ICD-10-CM

## 2023-05-02 LAB
ALBUMIN SERPL BCP-MCNC: 2.4 G/DL (ref 3.5–5.2)
ALP SERPL-CCNC: 68 U/L (ref 55–135)
ALT SERPL W/O P-5'-P-CCNC: 18 U/L (ref 10–44)
ANION GAP SERPL CALC-SCNC: 10 MMOL/L (ref 8–16)
AST SERPL-CCNC: 15 U/L (ref 10–40)
BASOPHILS # BLD AUTO: 0.02 K/UL (ref 0–0.2)
BASOPHILS NFR BLD: 0.2 % (ref 0–1.9)
BILIRUB SERPL-MCNC: 0.3 MG/DL (ref 0.1–1)
BUN SERPL-MCNC: 26 MG/DL (ref 8–23)
CALCIUM SERPL-MCNC: 8.5 MG/DL (ref 8.7–10.5)
CHLORIDE SERPL-SCNC: 112 MMOL/L (ref 95–110)
CO2 SERPL-SCNC: 20 MMOL/L (ref 23–29)
CREAT SERPL-MCNC: 0.8 MG/DL (ref 0.5–1.4)
DIFFERENTIAL METHOD: ABNORMAL
EOSINOPHIL # BLD AUTO: 0.4 K/UL (ref 0–0.5)
EOSINOPHIL NFR BLD: 5 % (ref 0–8)
ERYTHROCYTE [DISTWIDTH] IN BLOOD BY AUTOMATED COUNT: 15.8 % (ref 11.5–14.5)
EST. GFR  (NO RACE VARIABLE): >60 ML/MIN/1.73 M^2
GLUCOSE SERPL-MCNC: 148 MG/DL (ref 70–110)
HCT VFR BLD AUTO: 28.7 % (ref 40–54)
HGB BLD-MCNC: 8.3 G/DL (ref 14–18)
IMM GRANULOCYTES # BLD AUTO: 0.05 K/UL (ref 0–0.04)
IMM GRANULOCYTES NFR BLD AUTO: 0.6 % (ref 0–0.5)
LACTATE SERPL-SCNC: 1 MMOL/L (ref 0.5–2.2)
LYMPHOCYTES # BLD AUTO: 0.8 K/UL (ref 1–4.8)
LYMPHOCYTES NFR BLD: 8.8 % (ref 18–48)
MAGNESIUM SERPL-MCNC: 1.6 MG/DL (ref 1.6–2.6)
MCH RBC QN AUTO: 23.8 PG (ref 27–31)
MCHC RBC AUTO-ENTMCNC: 28.9 G/DL (ref 32–36)
MCV RBC AUTO: 82 FL (ref 82–98)
MONOCYTES # BLD AUTO: 0.9 K/UL (ref 0.3–1)
MONOCYTES NFR BLD: 11 % (ref 4–15)
NEUTROPHILS # BLD AUTO: 6.4 K/UL (ref 1.8–7.7)
NEUTROPHILS NFR BLD: 74.4 % (ref 38–73)
NRBC BLD-RTO: 0 /100 WBC
PHOSPHATE SERPL-MCNC: 3.2 MG/DL (ref 2.7–4.5)
PLATELET # BLD AUTO: 264 K/UL (ref 150–450)
PMV BLD AUTO: 8.4 FL (ref 9.2–12.9)
POCT GLUCOSE: 159 MG/DL (ref 70–110)
POCT GLUCOSE: 164 MG/DL (ref 70–110)
POTASSIUM SERPL-SCNC: 4.2 MMOL/L (ref 3.5–5.1)
PROT SERPL-MCNC: 6.3 G/DL (ref 6–8.4)
RBC # BLD AUTO: 3.49 M/UL (ref 4.6–6.2)
SODIUM SERPL-SCNC: 142 MMOL/L (ref 136–145)
WBC # BLD AUTO: 8.56 K/UL (ref 3.9–12.7)

## 2023-05-02 PROCEDURE — 63600175 PHARM REV CODE 636 W HCPCS

## 2023-05-02 PROCEDURE — 63600175 PHARM REV CODE 636 W HCPCS: Performed by: STUDENT IN AN ORGANIZED HEALTH CARE EDUCATION/TRAINING PROGRAM

## 2023-05-02 PROCEDURE — 83735 ASSAY OF MAGNESIUM: CPT

## 2023-05-02 PROCEDURE — 20600001 HC STEP DOWN PRIVATE ROOM

## 2023-05-02 PROCEDURE — 94667 MNPJ CHEST WALL 1ST: CPT

## 2023-05-02 PROCEDURE — 85025 COMPLETE CBC W/AUTO DIFF WBC: CPT

## 2023-05-02 PROCEDURE — 83605 ASSAY OF LACTIC ACID: CPT

## 2023-05-02 PROCEDURE — 94761 N-INVAS EAR/PLS OXIMETRY MLT: CPT

## 2023-05-02 PROCEDURE — 25000003 PHARM REV CODE 250: Performed by: STUDENT IN AN ORGANIZED HEALTH CARE EDUCATION/TRAINING PROGRAM

## 2023-05-02 PROCEDURE — 84100 ASSAY OF PHOSPHORUS: CPT

## 2023-05-02 PROCEDURE — 25000003 PHARM REV CODE 250: Performed by: COLON & RECTAL SURGERY

## 2023-05-02 PROCEDURE — 80053 COMPREHEN METABOLIC PANEL: CPT

## 2023-05-02 PROCEDURE — 99900035 HC TECH TIME PER 15 MIN (STAT)

## 2023-05-02 PROCEDURE — 36415 COLL VENOUS BLD VENIPUNCTURE: CPT

## 2023-05-02 RX ORDER — DORZOLAMIDE HYDROCHLORIDE AND TIMOLOL MALEATE 20; 5 MG/ML; MG/ML
1 SOLUTION/ DROPS OPHTHALMIC 2 TIMES DAILY
Status: DISCONTINUED | OUTPATIENT
Start: 2023-05-02 | End: 2023-05-02

## 2023-05-02 RX ORDER — DORZOLAMIDE HCL 20 MG/ML
1 SOLUTION/ DROPS OPHTHALMIC 2 TIMES DAILY
Status: DISCONTINUED | OUTPATIENT
Start: 2023-05-02 | End: 2023-05-11 | Stop reason: HOSPADM

## 2023-05-02 RX ORDER — LIDOCAINE HYDROCHLORIDE 10 MG/ML
1 INJECTION, SOLUTION EPIDURAL; INFILTRATION; INTRACAUDAL; PERINEURAL ONCE AS NEEDED
Status: COMPLETED | OUTPATIENT
Start: 2023-05-02 | End: 2023-05-05

## 2023-05-02 RX ORDER — LEVETIRACETAM 500 MG/5ML
500 INJECTION, SOLUTION, CONCENTRATE INTRAVENOUS EVERY 12 HOURS
Status: DISCONTINUED | OUTPATIENT
Start: 2023-05-02 | End: 2023-05-02

## 2023-05-02 RX ORDER — SODIUM CHLORIDE, SODIUM LACTATE, POTASSIUM CHLORIDE, CALCIUM CHLORIDE 600; 310; 30; 20 MG/100ML; MG/100ML; MG/100ML; MG/100ML
INJECTION, SOLUTION INTRAVENOUS CONTINUOUS
Status: DISCONTINUED | OUTPATIENT
Start: 2023-05-02 | End: 2023-05-03

## 2023-05-02 RX ORDER — ONDANSETRON 8 MG/1
8 TABLET, ORALLY DISINTEGRATING ORAL EVERY 8 HOURS PRN
Status: DISCONTINUED | OUTPATIENT
Start: 2023-05-02 | End: 2023-05-02

## 2023-05-02 RX ORDER — TAMSULOSIN HYDROCHLORIDE 0.4 MG/1
0.4 CAPSULE ORAL DAILY
Status: DISCONTINUED | OUTPATIENT
Start: 2023-05-02 | End: 2023-05-02

## 2023-05-02 RX ORDER — SERTRALINE HYDROCHLORIDE 100 MG/1
100 TABLET, FILM COATED ORAL NIGHTLY
Status: DISCONTINUED | OUTPATIENT
Start: 2023-05-02 | End: 2023-05-02

## 2023-05-02 RX ORDER — INSULIN ASPART 100 [IU]/ML
0-5 INJECTION, SOLUTION INTRAVENOUS; SUBCUTANEOUS EVERY 6 HOURS PRN
Status: DISCONTINUED | OUTPATIENT
Start: 2023-05-02 | End: 2023-05-05

## 2023-05-02 RX ORDER — GLUCAGON 1 MG
1 KIT INJECTION
Status: DISCONTINUED | OUTPATIENT
Start: 2023-05-02 | End: 2023-05-05

## 2023-05-02 RX ORDER — LATANOPROST 50 UG/ML
1 SOLUTION/ DROPS OPHTHALMIC NIGHTLY
Status: DISCONTINUED | OUTPATIENT
Start: 2023-05-02 | End: 2023-05-11 | Stop reason: HOSPADM

## 2023-05-02 RX ORDER — TIMOLOL MALEATE 5 MG/ML
1 SOLUTION/ DROPS OPHTHALMIC 2 TIMES DAILY
Status: DISCONTINUED | OUTPATIENT
Start: 2023-05-02 | End: 2023-05-11 | Stop reason: HOSPADM

## 2023-05-02 RX ORDER — HYDROMORPHONE HYDROCHLORIDE 1 MG/ML
0.2 INJECTION, SOLUTION INTRAMUSCULAR; INTRAVENOUS; SUBCUTANEOUS EVERY 6 HOURS PRN
Status: DISCONTINUED | OUTPATIENT
Start: 2023-05-02 | End: 2023-05-08

## 2023-05-02 RX ORDER — SODIUM CHLORIDE 0.9 % (FLUSH) 0.9 %
10 SYRINGE (ML) INJECTION
Status: DISCONTINUED | OUTPATIENT
Start: 2023-05-02 | End: 2023-05-11 | Stop reason: HOSPADM

## 2023-05-02 RX ORDER — LEVETIRACETAM 500 MG/5ML
750 INJECTION, SOLUTION, CONCENTRATE INTRAVENOUS EVERY 12 HOURS
Status: DISCONTINUED | OUTPATIENT
Start: 2023-05-02 | End: 2023-05-08

## 2023-05-02 RX ORDER — TALC
6 POWDER (GRAM) TOPICAL NIGHTLY PRN
Status: DISCONTINUED | OUTPATIENT
Start: 2023-05-02 | End: 2023-05-02

## 2023-05-02 RX ORDER — ONDANSETRON 2 MG/ML
4 INJECTION INTRAMUSCULAR; INTRAVENOUS EVERY 4 HOURS PRN
Status: DISCONTINUED | OUTPATIENT
Start: 2023-05-02 | End: 2023-05-11 | Stop reason: HOSPADM

## 2023-05-02 RX ORDER — LEVETIRACETAM 750 MG/1
750 TABLET ORAL 2 TIMES DAILY
Status: DISCONTINUED | OUTPATIENT
Start: 2023-05-02 | End: 2023-05-02

## 2023-05-02 RX ORDER — ENOXAPARIN SODIUM 100 MG/ML
40 INJECTION SUBCUTANEOUS EVERY 24 HOURS
Status: DISCONTINUED | OUTPATIENT
Start: 2023-05-02 | End: 2023-05-11 | Stop reason: HOSPADM

## 2023-05-02 RX ADMIN — DORZOLAMIDE 1 DROP: 20 SOLUTION/ DROPS OPHTHALMIC at 08:05

## 2023-05-02 RX ADMIN — SODIUM CHLORIDE, POTASSIUM CHLORIDE, SODIUM LACTATE AND CALCIUM CHLORIDE: 600; 310; 30; 20 INJECTION, SOLUTION INTRAVENOUS at 11:05

## 2023-05-02 RX ADMIN — LEVETIRACETAM 750 MG: 100 INJECTION, SOLUTION INTRAVENOUS at 08:05

## 2023-05-02 RX ADMIN — TIMOLOL MALEATE 1 DROP: 5 SOLUTION OPHTHALMIC at 08:05

## 2023-05-02 RX ADMIN — SODIUM CHLORIDE, POTASSIUM CHLORIDE, SODIUM LACTATE AND CALCIUM CHLORIDE 1000 ML: 600; 310; 30; 20 INJECTION, SOLUTION INTRAVENOUS at 06:05

## 2023-05-02 RX ADMIN — LATANOPROST 1 DROP: 50 SOLUTION OPHTHALMIC at 08:05

## 2023-05-02 RX ADMIN — ENOXAPARIN SODIUM 40 MG: 40 INJECTION SUBCUTANEOUS at 04:05
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
## 2023-05-02 NOTE — TELEPHONE ENCOUNTER
Spoke with brother , Khris, who states that patient was transported [place] [facility] [place]. Khris states that he was unaware of transfer until this morning. Explained that I will speak to Dr. Maurer regarding plan of care and call him back. Brother verbalizes understanding.
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
## 2023-05-02 NOTE — TELEPHONE ENCOUNTER
----- Message from Tristin Salazar sent at 5/2/2023  9:57 AM CDT -----  Contact: Truong-brother  Pt brother requesting call back RE: would like to speak in regards to his brother being transported to Hospital please call to discuss further    Confirmed contact below:  Contact Name:Truong Way  Phone Number: [phone]
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
## 2023-05-02 NOTE — PLAN OF CARE
[facility]  Initial Discharge Assessment       Primary Care Provider: Primary Doctor No    Admission Diagnosis: Abdominal distention [R14.0]    Admission Date: 5/2/2023  Expected Discharge Date:     Discharge Barriers Identified: None    Payor: MEDICARE / Plan: MEDICARE PART A & B / Product Type: Government /     Extended Emergency Contact Information  Primary Emergency Contact: SeamusTruong & Celsa Way  Address: [address] [place] [address] [place]  Mobile Phone: [phone]  Relation: Brother  Secondary Emergency Contact: Celsa Way  Address: [address] [place] [address] [place]  Work Phone: [phone]  Mobile Phone: [phone]  Relation: Relative    Discharge Plan A: Return to nursing home-resident of [facility] in [facility]    Discharge Plan B: Return to Nursing Home      [id number] - [place], [place] [address]  [address] [facility] [address] [facility] [address]  Phone: [phone] Fax: [phone]      Initial Assessment (most recent)       Adult Discharge Assessment - 05/02/23 1518          Discharge Assessment    Assessment Type Discharge Planning Assessment     Confirmed/corrected address, phone number and insurance Yes     Confirmed Demographics Correct on Facesheet     Source of Information family     Communicated ELIEL with patient/caregiver Date not available/Unable to determine     People in Home facility resident     Facility Arrived From: [facility]     Do you expect to return to your current living situation? Yes     Do you have help at home or someone to help you manage your care at home? Yes     Who are your caregiver(s) and their phone number(s)? facility staff     Prior to hospitilization cognitive status: Unable to Assess     Current cognitive status: Unable to Assess     Walking or Climbing Stairs ambulation difficulty,  dependent;stair climbing difficulty, dependent;transferring difficulty, assistance 1 person     Mobility Management pt does not walk     Dressing/Bathing bathing difficulty, assistance 1 person;dressing difficulty, assistance 1 person     Dressing/Bathing Management requires assistance from facility staff     Home Accessibility wheelchair accessible     Equipment Currently Used at Home wheelchair     Readmission within 30 days? Yes     Patient currently being followed by outpatient case management? No     Do you currently have service(s) that help you manage your care at home? No     Do you take prescription medications? Yes     Do you have prescription coverage? Yes     Do you have any problems affording any of your prescribed medications? No     Is the patient taking medications as prescribed? yes     Who is going to help you get home at discharge? pt will need transport back to his NH     How do you get to doctors appointments? other (see comments)   facility transportation    Are you on dialysis? No     Do you take coumadin? No     Discharge Plan A Return to nursing home     Discharge Plan B Return to Nursing Home     DME Needed Upon Discharge  other (see comments)   TBD    Discharge Plan discussed with: Sibling     Name(s) and Number(s) Truong Way [phone]     Discharge Barriers Identified None        Physical Activity    On average, how many days per week do you engage in moderate to strenuous exercise (like a brisk walk)? 0 days     On average, how many minutes do you engage in exercise at this level? 0 min        Financial Resource Strain    How hard is it for you to pay for the very basics like food, housing, medical care, and heating? Not hard at all        Housing Stability    In the last 12 months, was there a time when you were not able to pay the mortgage or rent on time? No     In the last 12 months, how many places have you lived? 1     In the last 12 months, was there a time when you did  not have a steady place to sleep or slept in a shelter (including now)? No        Transportation Needs    In the past 12 months, has lack of transportation kept you from medical appointments or from getting medications? No     In the past 12 months, has lack of transportation kept you from meetings, work, or from getting things needed for daily living? No        Food Insecurity    Within the past 12 months, you worried that your food would run out before you got the money to buy more. Never true     Within the past 12 months, the food you bought just didn't last and you didn't have money to get more. Never true        Stress    Do you feel stress - tense, restless, nervous, or anxious, or unable to sleep at night because your mind is troubled all the time - these days? Only a little        Social Connections    How often do you get together with friends or relatives? Twice a week     How often do you attend Buddhism or Confucianism services? Never     Do you belong to any clubs or organizations such as Buddhism groups, unions, fraternal or athletic groups, or school groups? No     How often do you attend meetings of the clubs or organizations you belong to? Never        Alcohol Use    Q1: How often do you have a drink containing alcohol? Never     Q2: How many drinks containing alcohol do you have on a typical day when you are drinking? Patient does not drink     Q3: How often do you have six or more drinks on one occasion? Never                   SW met with pts brother Truong to complete dc assessment. Pt is a resident at [facility] in [facility]. Pt requires assistance with ADLs. He does not walk.    Pt is not on HD or coumadin.    Plan will be for pt to return to his NH once ready for dc.    Pt will need transportation back to the NH.    BRIT Anand

## 2023-05-02 NOTE — SUBJECTIVE & OBJECTIVE
PTA Medications   Medication Sig    acetaminophen (TYLENOL) 650 MG TbSR Take 1 tablet (650 mg total) by mouth every 8 (eight) hours.    busPIRone (BUSPAR) 7.5 MG tablet Take 7.5 mg by mouth 2 (two) times a day.    clobetasoL (CLOBEX) 0.05 % lotion Apply topically.    dorzolamide-timolol 2-0.5% (COSOPT) 22.3-6.8 mg/mL ophthalmic solution Place into both eyes.    fluticasone propionate (FLONASE) 50 mcg/actuation nasal spray by Each Nostril route.    gabapentin (NEURONTIN) 300 MG capsule Take 1 capsule (300 mg total) by mouth 3 (three) times daily. for 14 days    ibuprofen (ADVIL,MOTRIN) 800 MG tablet Take 1 tablet (800 mg total) by mouth every 8 (eight) hours as needed for Pain.    insulin NPH-insulin regular, 70/30, 100 unit/mL (70-30) injection 36 Units in AM and 26 Units in PM    latanoprost 0.005 % ophthalmic solution Apply 1 drop to eye every evening.    levETIRAcetam (KEPPRA) 750 MG Tab Take 750 mg by mouth 2 (two) times daily.    loratadine (CLARITIN) 10 mg tablet Take 10 mg by mouth.    magnesium chloride (MAG 64) 64 mg TbEC Take 1 tablet by mouth once daily.    metFORMIN (GLUCOPHAGE) 500 MG tablet Take 500 mg by mouth 2 (two) times daily with meals.    nystatin (MYCOSTATIN) powder nystatin 100,000 unit/gram topical powder   APPLY TO THE AFFECTED AREA(S) BY TOPICAL ROUTE 2 TIMES PER DAY UNTIL HEALED    oxyCODONE (ROXICODONE) 5 MG immediate release tablet Take 1 tablet (5 mg total) by mouth every 4 (four) hours as needed for Pain.    phenylephrine-DM-guaiFENesin 5- mg/5 mL Liqd Take by mouth.    polyethylene glycol (GLYCOLAX) 17 gram/dose powder Use cap to measure 17g, mix with liquid, and take by mouth daily as needed (Constipation).    salicylic acid 2 % Crea 2 g.    sertraline (ZOLOFT) 50 MG tablet Take 100 mg by mouth every evening.    tamsulosin (FLOMAX) 0.4 mg Cap Take 0.4 mg by mouth.       Review of patient's allergies indicates:   Allergen Reactions    Latex, natural rubber Anxiety       Past  Medical History:   Diagnosis Date    Diabetes     Hyperlipidemia     Rectal cancer 11/11/2022    pMMR    Seizure      Past Surgical History:   Procedure Laterality Date    BACK SURGERY      FLEXIBLE SIGMOIDOSCOPY N/A 4/26/2023    Procedure: SIGMOIDOSCOPY, FLEXIBLE;  Surgeon: BRISA Maurer MD;  Location: [facility] OR [address] [place] [address];  Service: Colon and Rectal;  Laterality: N/A;    HIP REPLACEMENT ARTHROPLASTY      LAPAROSCOPIC PROCTECTOMY N/A 4/26/2023    Procedure: PROCTECTOMY, LAPAROSCOPIC, ERAS high;  Surgeon: BRISA Maurer MD;  Location: [facility] OR [address] [place] [address];  Service: Colon and Rectal;  Laterality: N/A;    TIBIA FRACTURE SURGERY       Family History       Problem Relation (Age of Onset)    Prostate cancer Father          Tobacco Use    Smoking status: Never    Smokeless tobacco: Not on file   Substance and Sexual Activity    Alcohol use: Not Currently    Drug use: Not Currently    Sexual activity: Not on file     Review of Systems   Unable to perform ROS: Other   Objective:     Vital Signs (Most Recent):  Temp: 98.9 °F (37.2 °C) (05/02/23 1055)  Pulse: 89 (05/02/23 1055)  Resp: 16 (05/02/23 1055)  BP: 135/80 (05/02/23 1055)  SpO2: (!) 93 % (05/02/23 1055)   Vital Signs (24h Range):  Temp:  [98.6 °F (37 °C)-98.9 °F (37.2 °C)] 98.9 °F (37.2 °C)  Pulse:  [89-95] 89  Resp:  [16-17] 16  SpO2:  [93 %-98 %] 93 %  BP: (126-135)/(76-82) 135/80     Weight: 64.3 kg (141 lb 12.1 oz)  Body mass index is 24.33 kg/m².    Physical Exam  Constitutional:       General: He is not in acute distress.  HENT:      Nose:      Comments: NGT in place with stool output  Cardiovascular:      Rate and Rhythm: Normal rate and regular rhythm.   Pulmonary:      Effort: Pulmonary effort is normal. No respiratory distress.      Breath sounds: Normal breath sounds.   Abdominal:      General: There is distension.      Tenderness: Tenderness: unable to assess. There is no guarding.      Comments: End colostomy with bowel sweat in appliance, no flatus  or stool noted, mucosa pink and healthy   Skin:     General: Skin is warm and dry.   Neurological:      Mental Status: Mental status is at baseline.       Significant Labs:  BMP (Last 3 Results):   Recent Labs   Lab 04/27/23  0620   *      K 3.8      CO2 22*   BUN 12   CREATININE 1.0   CALCIUM 7.3*   MG 1.5*     CBC (Last 3 Results):   Recent Labs   Lab 04/26/23  1522 04/27/23  0620   WBC  --  9.55   RBC  --  3.41*   HGB  --  8.4*   HCT 27* 27.9*   PLT  --  177   MCV  --  82   MCH  --  24.6*   MCHC  --  30.1*       Significant Diagnostics:  None

## 2023-05-03 LAB
ALBUMIN SERPL BCP-MCNC: 2.1 G/DL (ref 3.5–5.2)
ALP SERPL-CCNC: 58 U/L (ref 55–135)
ALT SERPL W/O P-5'-P-CCNC: 12 U/L (ref 10–44)
ANION GAP SERPL CALC-SCNC: 11 MMOL/L (ref 8–16)
ANION GAP SERPL CALC-SCNC: 9 MMOL/L (ref 8–16)
AST SERPL-CCNC: 12 U/L (ref 10–40)
BASOPHILS # BLD AUTO: 0.02 K/UL (ref 0–0.2)
BASOPHILS NFR BLD: 0.2 % (ref 0–1.9)
BILIRUB SERPL-MCNC: 0.3 MG/DL (ref 0.1–1)
BUN SERPL-MCNC: 20 MG/DL (ref 8–23)
BUN SERPL-MCNC: 20 MG/DL (ref 8–23)
CALCIUM SERPL-MCNC: 7.8 MG/DL (ref 8.7–10.5)
CALCIUM SERPL-MCNC: 8 MG/DL (ref 8.7–10.5)
CHLORIDE SERPL-SCNC: 111 MMOL/L (ref 95–110)
CHLORIDE SERPL-SCNC: 111 MMOL/L (ref 95–110)
CO2 SERPL-SCNC: 18 MMOL/L (ref 23–29)
CO2 SERPL-SCNC: 21 MMOL/L (ref 23–29)
CREAT SERPL-MCNC: 0.7 MG/DL (ref 0.5–1.4)
CREAT SERPL-MCNC: 0.7 MG/DL (ref 0.5–1.4)
CRP SERPL-MCNC: 86.1 MG/L (ref 0–8.2)
DIFFERENTIAL METHOD: ABNORMAL
EOSINOPHIL # BLD AUTO: 0.4 K/UL (ref 0–0.5)
EOSINOPHIL NFR BLD: 5.2 % (ref 0–8)
ERYTHROCYTE [DISTWIDTH] IN BLOOD BY AUTOMATED COUNT: 15.5 % (ref 11.5–14.5)
EST. GFR  (NO RACE VARIABLE): >60 ML/MIN/1.73 M^2
EST. GFR  (NO RACE VARIABLE): >60 ML/MIN/1.73 M^2
GLUCOSE SERPL-MCNC: 122 MG/DL (ref 70–110)
GLUCOSE SERPL-MCNC: 126 MG/DL (ref 70–110)
HCT VFR BLD AUTO: 25.5 % (ref 40–54)
HGB BLD-MCNC: 7.4 G/DL (ref 14–18)
IMM GRANULOCYTES # BLD AUTO: 0.04 K/UL (ref 0–0.04)
IMM GRANULOCYTES NFR BLD AUTO: 0.5 % (ref 0–0.5)
LYMPHOCYTES # BLD AUTO: 0.7 K/UL (ref 1–4.8)
LYMPHOCYTES NFR BLD: 8.2 % (ref 18–48)
MAGNESIUM SERPL-MCNC: 1.4 MG/DL (ref 1.6–2.6)
MAGNESIUM SERPL-MCNC: 1.4 MG/DL (ref 1.6–2.6)
MCH RBC QN AUTO: 24 PG (ref 27–31)
MCHC RBC AUTO-ENTMCNC: 29 G/DL (ref 32–36)
MCV RBC AUTO: 83 FL (ref 82–98)
MONOCYTES # BLD AUTO: 0.9 K/UL (ref 0.3–1)
MONOCYTES NFR BLD: 10.5 % (ref 4–15)
NEUTROPHILS # BLD AUTO: 6.2 K/UL (ref 1.8–7.7)
NEUTROPHILS NFR BLD: 75.4 % (ref 38–73)
NRBC BLD-RTO: 0 /100 WBC
PHOSPHATE SERPL-MCNC: 2.5 MG/DL (ref 2.7–4.5)
PHOSPHATE SERPL-MCNC: 2.6 MG/DL (ref 2.7–4.5)
PLATELET # BLD AUTO: 275 K/UL (ref 150–450)
PMV BLD AUTO: 8.9 FL (ref 9.2–12.9)
POCT GLUCOSE: 125 MG/DL (ref 70–110)
POCT GLUCOSE: 133 MG/DL (ref 70–110)
POCT GLUCOSE: 158 MG/DL (ref 70–110)
POCT GLUCOSE: 170 MG/DL (ref 70–110)
POCT GLUCOSE: 191 MG/DL (ref 70–110)
POTASSIUM SERPL-SCNC: 3.7 MMOL/L (ref 3.5–5.1)
POTASSIUM SERPL-SCNC: 3.7 MMOL/L (ref 3.5–5.1)
PROT SERPL-MCNC: 5.3 G/DL (ref 6–8.4)
RBC # BLD AUTO: 3.08 M/UL (ref 4.6–6.2)
SODIUM SERPL-SCNC: 140 MMOL/L (ref 136–145)
SODIUM SERPL-SCNC: 141 MMOL/L (ref 136–145)
WBC # BLD AUTO: 8.25 K/UL (ref 3.9–12.7)

## 2023-05-03 PROCEDURE — 99900035 HC TECH TIME PER 15 MIN (STAT)

## 2023-05-03 PROCEDURE — 94668 MNPJ CHEST WALL SBSQ: CPT

## 2023-05-03 PROCEDURE — 27000221 HC OXYGEN, UP TO 24 HOURS

## 2023-05-03 PROCEDURE — 84100 ASSAY OF PHOSPHORUS: CPT | Mod: 91 | Performed by: STUDENT IN AN ORGANIZED HEALTH CARE EDUCATION/TRAINING PROGRAM

## 2023-05-03 PROCEDURE — 63600175 PHARM REV CODE 636 W HCPCS

## 2023-05-03 PROCEDURE — 80053 COMPREHEN METABOLIC PANEL: CPT | Performed by: STUDENT IN AN ORGANIZED HEALTH CARE EDUCATION/TRAINING PROGRAM

## 2023-05-03 PROCEDURE — 84100 ASSAY OF PHOSPHORUS: CPT

## 2023-05-03 PROCEDURE — 36415 COLL VENOUS BLD VENIPUNCTURE: CPT

## 2023-05-03 PROCEDURE — 83735 ASSAY OF MAGNESIUM: CPT

## 2023-05-03 PROCEDURE — 63600175 PHARM REV CODE 636 W HCPCS: Performed by: STUDENT IN AN ORGANIZED HEALTH CARE EDUCATION/TRAINING PROGRAM

## 2023-05-03 PROCEDURE — 80048 BASIC METABOLIC PNL TOTAL CA: CPT

## 2023-05-03 PROCEDURE — 83735 ASSAY OF MAGNESIUM: CPT | Mod: 91 | Performed by: STUDENT IN AN ORGANIZED HEALTH CARE EDUCATION/TRAINING PROGRAM

## 2023-05-03 PROCEDURE — 85025 COMPLETE CBC W/AUTO DIFF WBC: CPT | Performed by: STUDENT IN AN ORGANIZED HEALTH CARE EDUCATION/TRAINING PROGRAM

## 2023-05-03 PROCEDURE — 20600001 HC STEP DOWN PRIVATE ROOM

## 2023-05-03 PROCEDURE — 94667 MNPJ CHEST WALL 1ST: CPT

## 2023-05-03 PROCEDURE — 86140 C-REACTIVE PROTEIN: CPT | Performed by: STUDENT IN AN ORGANIZED HEALTH CARE EDUCATION/TRAINING PROGRAM

## 2023-05-03 PROCEDURE — 94761 N-INVAS EAR/PLS OXIMETRY MLT: CPT

## 2023-05-03 RX ORDER — DEXTROSE MONOHYDRATE AND SODIUM CHLORIDE 5; .9 G/100ML; G/100ML
INJECTION, SOLUTION INTRAVENOUS CONTINUOUS
Status: DISCONTINUED | OUTPATIENT
Start: 2023-05-03 | End: 2023-05-04

## 2023-05-03 RX ADMIN — HYDROMORPHONE HYDROCHLORIDE 0.2 MG: 1 INJECTION, SOLUTION INTRAMUSCULAR; INTRAVENOUS; SUBCUTANEOUS at 07:05

## 2023-05-03 RX ADMIN — LATANOPROST 1 DROP: 50 SOLUTION OPHTHALMIC at 09:05

## 2023-05-03 RX ADMIN — DEXTROSE AND SODIUM CHLORIDE: 5; 900 INJECTION, SOLUTION INTRAVENOUS at 10:05

## 2023-05-03 RX ADMIN — DORZOLAMIDE 1 DROP: 20 SOLUTION/ DROPS OPHTHALMIC at 09:05

## 2023-05-03 RX ADMIN — TIMOLOL MALEATE 1 DROP: 5 SOLUTION OPHTHALMIC at 08:05

## 2023-05-03 RX ADMIN — ENOXAPARIN SODIUM 40 MG: 40 INJECTION SUBCUTANEOUS at 05:05

## 2023-05-03 RX ADMIN — SODIUM CHLORIDE, POTASSIUM CHLORIDE, SODIUM LACTATE AND CALCIUM CHLORIDE 1000 ML: 600; 310; 30; 20 INJECTION, SOLUTION INTRAVENOUS at 06:05

## 2023-05-03 RX ADMIN — LEVETIRACETAM 750 MG: 100 INJECTION, SOLUTION INTRAVENOUS at 08:05

## 2023-05-03 RX ADMIN — LEVETIRACETAM 750 MG: 100 INJECTION, SOLUTION INTRAVENOUS at 09:05

## 2023-05-03 RX ADMIN — TIMOLOL MALEATE 1 DROP: 5 SOLUTION OPHTHALMIC at 09:05

## 2023-05-03 RX ADMIN — HYDROMORPHONE HYDROCHLORIDE 0.2 MG: 1 INJECTION, SOLUTION INTRAMUSCULAR; INTRAVENOUS; SUBCUTANEOUS at 09:05

## 2023-05-03 RX ADMIN — DORZOLAMIDE 1 DROP: 20 SOLUTION/ DROPS OPHTHALMIC at 08:05

## 2023-05-03 NOTE — NURSING
Verified with  that it's ok to let pt use the Incentive Spirometer. He has slow response but followed commands. Instructions given to the pt and his brother about IS, pt was able to get up to 1000cc on IS. WCTM
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
## 2023-05-03 NOTE — PLAN OF CARE
[facility] Plan of Care Note  Dx SMALL BOWEL OBS    Shift Events     Goals of Care:STOMACH DECOMPRESSION, PAIN CONTROL, NO N/V    Neuro: ALERTT AND ORIENTED X 3     Vital Signs: WNL    Respiratory: 1 L NC     Diet: NPO    Is patient tolerating current diet? NA    GTTS: LR AT 75 ML/HR    Urine Output/Bowel Movement: BREIF INCONTINENCE X2      Drains/Tubes/Tube Feeds (include total output/shift):  NG TUBE TO LIWS 580  Lines: YUE 20 G    Accuchecks: EVERY 6 HOURS    Skin: INCISION TO ABDOMEN AREA TO BUTTOCKS MEPILEX TO COCCYX    Fall Risk Score: 11    Activity level? BED BOUND    Any scheduled procedures? NA    Any safety concerns? BED ALARM IN PLACE    Other: NA

## 2023-05-03 NOTE — PLAN OF CARE
Pt Aox4. VSS. No signs of respiratory distress on 2L NC. Colostomy in place. NG in place. Pt remained injury free through shift. Will continue POC.

## 2023-05-03 NOTE — NURSING
At 1100: Pt had a saturated diaper with urine, pt was cleaned up-bath given, small non blanchable redness noticed on sacrum., skin barrier cream, foam dressing and waffle mattress applied. Condom cath applied. Wound care consult  -Generalized edema with mild pitting edema on BLE- notified. [facility]

## 2023-05-03 NOTE — SUBJECTIVE & OBJECTIVE
Subjective:     Interval History: NGT with thinner output since placement yesterday, 600cc output since yesterday. Patient still requesting water, has been drinking some water through night. Brother at bedside. Denies N/V.    Post-Op Info:  * No surgery found *          Medications:  Continuous Infusions:   lactated ringers 75 mL/hr at 05/02/23 1215     Scheduled Meds:   dorzolamide  1 drop Both Eyes BID    enoxaparin  40 mg Subcutaneous Daily    latanoprost  1 drop Both Eyes QHS    levetiracetam IV  750 mg Intravenous Q12H    timolol maleate 0.5%  1 drop Both Eyes BID     PRN Meds:   dextrose 10%    dextrose 10%    glucagon (human recombinant)    HYDROmorphone    ibuprofen    insulin aspart U-100    LIDOcaine (PF) 10 mg/ml (1%)    ondansetron    sodium chloride 0.9%        Objective:     Vital Signs (Most Recent):  Temp: 98.6 °F (37 °C) (05/03/23 0334)  Pulse: 87 (05/03/23 0334)  Resp: 18 (05/03/23 0756)  BP: 124/69 (05/03/23 0334)  SpO2: (!) 93 % (05/03/23 0334)   Vital Signs (24h Range):  Temp:  [97.4 °F (36.3 °C)-98.9 °F (37.2 °C)] 98.6 °F (37 °C)  Pulse:  [] 87  Resp:  [16-20] 18  SpO2:  [92 %-96 %] 93 %  BP: (124-170)/(68-97) 124/69     Intake/Output - Last 3 Shifts         05/01 0700  05/02 0659 05/02 0700  05/03 0659 05/03 0700  05/04 0659           Urine Occurrence  3 x     Stool Occurrence  0 x           Physical Exam  Constitutional:       General: He is not in acute distress.  HENT:      Nose:      Comments: NGT in place with thinner, pale yellow output  Cardiovascular:      Rate and Rhythm: Normal rate and regular rhythm.   Pulmonary:      Effort: Pulmonary effort is normal. No respiratory distress.      Breath sounds: Normal breath sounds.   Abdominal:      General: There is distension.      Tenderness: Tenderness: none There is no guarding.      Comments: End colostomy with bowel sweat in appliance, no flatus or stool noted, mucosa pink and healthy   Skin:     General: Skin is warm and dry.    Neurological:      Mental Status: Mental status is at baseline.     Significant Labs:  BMP (Last 3 Results):   Recent Labs   Lab 05/02/23  1124 05/03/23  0518 05/03/23  0544   * 126* 122*    141 140   K 4.2 3.7 3.7   * 111* 111*   CO2 20* 21* 18*   BUN 26* 20 20   CREATININE 0.8 0.7 0.7   CALCIUM 8.5* 8.0* 7.8*   MG 1.6 1.4* 1.4*     CBC (Last 3 Results):   Recent Labs   Lab 04/27/23  0620 05/02/23  1124 05/03/23  0544   WBC 9.55 8.56 8.25   RBC 3.41* 3.49* 3.08*   HGB 8.4* 8.3* 7.4*   HCT 27.9* 28.7* 25.5*    264 275   MCV 82 82 83   MCH 24.6* 23.8* 24.0*   MCHC 30.1* 28.9* 29.0*       Significant Diagnostics:  None

## 2023-05-03 NOTE — NURSING
Notified MD August pt urine output 175 and cloudy. No new orders given with continue to monitor pt.
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
## 2023-05-04 LAB
ALLENS TEST: ABNORMAL
ANION GAP SERPL CALC-SCNC: 6 MMOL/L (ref 8–16)
BUN SERPL-MCNC: 16 MG/DL (ref 8–23)
CALCIUM SERPL-MCNC: 7.7 MG/DL (ref 8.7–10.5)
CHLORIDE SERPL-SCNC: 114 MMOL/L (ref 95–110)
CO2 SERPL-SCNC: 23 MMOL/L (ref 23–29)
CREAT SERPL-MCNC: 0.7 MG/DL (ref 0.5–1.4)
CRP SERPL-MCNC: 81.6 MG/L (ref 0–8.2)
DELSYS: ABNORMAL
EST. GFR  (NO RACE VARIABLE): >60 ML/MIN/1.73 M^2
FLOW: 6
GLUCOSE SERPL-MCNC: 179 MG/DL (ref 70–110)
HCO3 UR-SCNC: 25.3 MMOL/L (ref 24–28)
MAGNESIUM SERPL-MCNC: 1.4 MG/DL (ref 1.6–2.6)
MODE: ABNORMAL
PCO2 BLDA: 46.5 MMHG (ref 35–45)
PH SMN: 7.34 [PH] (ref 7.35–7.45)
PHOSPHATE SERPL-MCNC: 2 MG/DL (ref 2.7–4.5)
PO2 BLDA: 30 MMHG (ref 40–60)
POC BE: 0 MMOL/L
POC SATURATED O2: 53 % (ref 95–100)
POC TCO2: 27 MMOL/L (ref 24–29)
POCT GLUCOSE: 198 MG/DL (ref 70–110)
POCT GLUCOSE: 253 MG/DL (ref 70–110)
POCT GLUCOSE: 270 MG/DL (ref 70–110)
POCT GLUCOSE: 292 MG/DL (ref 70–110)
POTASSIUM SERPL-SCNC: 3.6 MMOL/L (ref 3.5–5.1)
SAMPLE: ABNORMAL
SITE: ABNORMAL
SODIUM SERPL-SCNC: 143 MMOL/L (ref 136–145)
SP02: 98

## 2023-05-04 PROCEDURE — 63600175 PHARM REV CODE 636 W HCPCS: Performed by: STUDENT IN AN ORGANIZED HEALTH CARE EDUCATION/TRAINING PROGRAM

## 2023-05-04 PROCEDURE — 36415 COLL VENOUS BLD VENIPUNCTURE: CPT

## 2023-05-04 PROCEDURE — 25000003 PHARM REV CODE 250

## 2023-05-04 PROCEDURE — 82803 BLOOD GASES ANY COMBINATION: CPT

## 2023-05-04 PROCEDURE — 99900035 HC TECH TIME PER 15 MIN (STAT)

## 2023-05-04 PROCEDURE — 20600001 HC STEP DOWN PRIVATE ROOM

## 2023-05-04 PROCEDURE — 83735 ASSAY OF MAGNESIUM: CPT

## 2023-05-04 PROCEDURE — 63600175 PHARM REV CODE 636 W HCPCS

## 2023-05-04 PROCEDURE — 36415 COLL VENOUS BLD VENIPUNCTURE: CPT | Performed by: STUDENT IN AN ORGANIZED HEALTH CARE EDUCATION/TRAINING PROGRAM

## 2023-05-04 PROCEDURE — 80048 BASIC METABOLIC PNL TOTAL CA: CPT | Performed by: STUDENT IN AN ORGANIZED HEALTH CARE EDUCATION/TRAINING PROGRAM

## 2023-05-04 PROCEDURE — 84100 ASSAY OF PHOSPHORUS: CPT

## 2023-05-04 PROCEDURE — 27000221 HC OXYGEN, UP TO 24 HOURS

## 2023-05-04 PROCEDURE — 86140 C-REACTIVE PROTEIN: CPT | Performed by: STUDENT IN AN ORGANIZED HEALTH CARE EDUCATION/TRAINING PROGRAM

## 2023-05-04 PROCEDURE — 94761 N-INVAS EAR/PLS OXIMETRY MLT: CPT

## 2023-05-04 RX ORDER — LORAZEPAM 2 MG/ML
0.5 INJECTION INTRAMUSCULAR EVERY 4 HOURS PRN
Status: DISCONTINUED | OUTPATIENT
Start: 2023-05-04 | End: 2023-05-05

## 2023-05-04 RX ORDER — SODIUM CHLORIDE, SODIUM LACTATE, POTASSIUM CHLORIDE, CALCIUM CHLORIDE 600; 310; 30; 20 MG/100ML; MG/100ML; MG/100ML; MG/100ML
INJECTION, SOLUTION INTRAVENOUS CONTINUOUS
Status: DISCONTINUED | OUTPATIENT
Start: 2023-05-04 | End: 2023-05-05

## 2023-05-04 RX ORDER — POTASSIUM CHLORIDE 7.45 MG/ML
10 INJECTION INTRAVENOUS
Status: COMPLETED | OUTPATIENT
Start: 2023-05-04 | End: 2023-05-04

## 2023-05-04 RX ORDER — MAGNESIUM SULFATE HEPTAHYDRATE 40 MG/ML
2 INJECTION, SOLUTION INTRAVENOUS
Status: COMPLETED | OUTPATIENT
Start: 2023-05-04 | End: 2023-05-04

## 2023-05-04 RX ADMIN — INSULIN ASPART 3 UNITS: 100 INJECTION, SOLUTION INTRAVENOUS; SUBCUTANEOUS at 03:05

## 2023-05-04 RX ADMIN — LEVETIRACETAM 750 MG: 100 INJECTION, SOLUTION INTRAVENOUS at 08:05

## 2023-05-04 RX ADMIN — ENOXAPARIN SODIUM 40 MG: 40 INJECTION SUBCUTANEOUS at 06:05

## 2023-05-04 RX ADMIN — MAGNESIUM SULFATE 2 G: 2 INJECTION INTRAVENOUS at 09:05

## 2023-05-04 RX ADMIN — POTASSIUM CHLORIDE 10 MEQ: 10 INJECTION, SOLUTION INTRAVENOUS at 02:05

## 2023-05-04 RX ADMIN — POTASSIUM CHLORIDE 10 MEQ: 10 INJECTION, SOLUTION INTRAVENOUS at 12:05

## 2023-05-04 RX ADMIN — LORAZEPAM 0.5 MG: 2 INJECTION INTRAMUSCULAR; INTRAVENOUS at 11:05

## 2023-05-04 RX ADMIN — HYDROMORPHONE HYDROCHLORIDE 0.2 MG: 1 INJECTION, SOLUTION INTRAMUSCULAR; INTRAVENOUS; SUBCUTANEOUS at 03:05

## 2023-05-04 RX ADMIN — POTASSIUM PHOSPHATE, MONOBASIC AND POTASSIUM PHOSPHATE, DIBASIC 30 MMOL: 224; 236 INJECTION, SOLUTION, CONCENTRATE INTRAVENOUS at 10:05

## 2023-05-04 RX ADMIN — SODIUM CHLORIDE, POTASSIUM CHLORIDE, SODIUM LACTATE AND CALCIUM CHLORIDE: 600; 310; 30; 20 INJECTION, SOLUTION INTRAVENOUS at 06:05

## 2023-05-04 RX ADMIN — TIMOLOL MALEATE 1 DROP: 5 SOLUTION OPHTHALMIC at 08:05

## 2023-05-04 RX ADMIN — LORAZEPAM 0.5 MG: 2 INJECTION INTRAMUSCULAR; INTRAVENOUS at 06:05

## 2023-05-04 RX ADMIN — DEXTROSE AND SODIUM CHLORIDE: 5; 900 INJECTION, SOLUTION INTRAVENOUS at 12:05

## 2023-05-04 RX ADMIN — INSULIN ASPART 3 UNITS: 100 INJECTION, SOLUTION INTRAVENOUS; SUBCUTANEOUS at 06:05

## 2023-05-04 RX ADMIN — DORZOLAMIDE 1 DROP: 20 SOLUTION/ DROPS OPHTHALMIC at 08:05

## 2023-05-04 RX ADMIN — MAGNESIUM SULFATE 2 G: 2 INJECTION INTRAVENOUS at 11:05

## 2023-05-04 RX ADMIN — LATANOPROST 1 DROP: 50 SOLUTION OPHTHALMIC at 08:05

## 2023-05-04 RX ADMIN — POTASSIUM CHLORIDE 10 MEQ: 10 INJECTION, SOLUTION INTRAVENOUS at 03:05

## 2023-05-04 NOTE — PLAN OF CARE
Recommendations     1.) If and when medically feasible, recommend ADAT with goal of Low Fiber/Low Residue diet- texture per SLP/MD.      2.) If alternative nutrition medically warranted, recommend initiating TPN with 85g AA and 255g Dextrose + IV lipids to provide 1709kcal and 85g PRO (GIR 2.8).      3.) Recommend MVI+VitC+zinc to help aid in wound healing.      4.) RD to monitor tolerance, skin, labs.        Goals: to meet % of EEN/EPN by next RD f/u  Nutrition Goal Status: new  Communication of RD Recs:  (POC)

## 2023-05-04 NOTE — RESPIRATORY THERAPY
RAPID RESPONSE RESPIRATORY THERAPY NOTE             Code Status: Full Code   : 1955  Bed: 1002/1002 A:   MRN: [id number]  Time page Received: 1628  Time Rapid Response RT at Bedside: 1630  Time Rapid Response RT left Bedside: 1725    SITUATION    Evaluated patient for: Acute respiratory distress    BACKGROUND    Why is the patient in the hospital?: Small bowel obstruction    Patient has a past medical history of Diabetes, Hyperlipidemia, Rectal cancer, and Seizure.    24 Hours Vitals Range:  Temp:  [97.6 °F (36.4 °C)-98.6 °F (37 °C)]   Pulse:  [79-98]   Resp:  [16-28]   BP: (108-204)/(65-99)   SpO2:  [82 %-98 %]     Labs:    Recent Labs     23  0518 23  0544 23  0334 23  0815    140 143  --    K 3.7 3.7 3.6  --    * 111* 114*  --    CO2 21* 18* 23  --    CREATININE 0.7 0.7 0.7  --    * 122* 179*  --    PHOS 2.6* 2.5*  --  2.0*   MG 1.4* 1.4*  --  1.4*        Recent Labs     23  1716   PH 7.344*   PCO2 46.5*   PO2 30*   HCO3 25.3   POCSATURATED 53*   BE 0       ASSESSMENT/INTERVENTIONS  Pt in bed Tachypnea and shallow breathing.    Last Vitals: Temp: 98.1 °F (36.7 °C) ( 1538)  Pulse: 90 (1720)  Resp: 28 (1627)  BP: 168/81 (1720)  SpO2: 92 % (1720)  Level of Consciousness: Level of Consciousness (AVPU): alert  Respiratory Effort: Respiratory Effort: Moderate, Severe, Grunting, Labored, Mouth breathing, Pursed lips  Expansion/Accessory Muscle Usage: Expansion/Accessory Muscles/Retractions: accessory muscle use  All Lung Field Breath Sounds: All Lung Fields Breath Sounds: Anterior:, Posterior:, Lateral:, coarse, wheezes, expiratory, diminished  O2 Device/Concentration: N.C. 6LPM  NIPPV: No Surgical airway: No Vent: No  ETCO2 monitored:    Ambu at bedside: Ambu bag with the patient?: Yes, Adult Ambu    Active Orders   Respiratory Care    Chest physiotherapy Q6H     Frequency: Q6H     Number of Occurrences: Until Specified     Order  Comments: Cant perform Acapella or IS due to mental status       Order Questions:      Indications: ATELECTASIS PROPHYLAXIS    Oxygen Continuous     Frequency: Continuous     Number of Occurrences: Until Specified     Order Questions:      Device type: Low flow      Device: Nasal Cannula (1- 5 Liters)      LPM: 2      Titrate O2 per Oxygen Titration Protocol: Yes      To maintain SpO2 goal of: >= 90%    Pulse Oximetry Q4H     Frequency: Q4H     Number of Occurrences: Until Specified       RECOMMENDATIONS  ?  We recommend: RRT Recs: C.C. consult and CXR/VBG    ESCALATION        FOLLOW-UP    Disposition: Remain in room 1002.    Please call back the Rapid Response RT, Manuel Smith, RRT at x [phone] for any questions or concerns.
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
## 2023-05-04 NOTE — CONSULTS
[facility]  Wound Care    Patient Name:  Yan Way   MRN:  [id number]  Date: 5/4/2023  Diagnosis: Small bowel obstruction    History:     Past Medical History:   Diagnosis Date    Diabetes     Hyperlipidemia     Rectal cancer 11/11/2022    pMMR    Seizure        Social History     Socioeconomic History    Marital status: Single   Tobacco Use    Smoking status: Never   Substance and Sexual Activity    Alcohol use: Not Currently    Drug use: Not Currently     Social Determinants of Health     Financial Resource Strain: Low Risk     Difficulty of Paying Living Expenses: Not hard at all   Food Insecurity: No Food Insecurity    Worried About Running Out of Food in the Last Year: Never true    Ran Out of Food in the Last Year: Never true   Transportation Needs: No Transportation Needs    Lack of Transportation (Medical): No    Lack of Transportation (Non-Medical): No   Physical Activity: Inactive    Days of Exercise per Week: 0 days    Minutes of Exercise per Session: 0 min   Stress: No Stress Concern Present    Feeling of Stress : Only a little   Social Connections: Unknown    Frequency of Communication with Friends and Family: Patient refused    Frequency of Social Gatherings with Friends and Family: Twice a week    Attends Mosque Services: Never    Active Member of Clubs or Organizations: No    Attends Club or Organization Meetings: Never    Marital Status: Patient refused   Housing Stability: Low Risk     Unable to Pay for Housing in the Last Year: No    Number of Places Lived in the Last Year: 1    Unstable Housing in the Last Year: No       Precautions:     Allergies as of 05/01/2023 - Reviewed 04/29/2023   Allergen Reaction Noted    Latex, natural rubber Anxiety 02/07/2023       [facility] Assessment Details/Treatment     Patient seen for wound care consultation for buttocks.   Reviewed chart for this encounter.   See Flow Sheet for findings.    Pt found lying in bed w/ family at bedside, agreeable to care at  this time. Pt assisted into lateral position for assessment. Sacral foam dressing removed - scant clear, green drainage noted to dressing. To top of gluteal cleft, pt has scattered small partial thickness wounds - tissue is pink and moist. Suspect pt has moisture related irritation, cleansed w/ baby wipes and applied Triad. Pt on a waffle mattress - added air for proper inflation. Heel lift boots ordered.    RECOMMENDATIONS:  BID/PRN - Apply Triad to gluteal cleft, sacrum. Keep pt clean and dry, no diapers.  Bedside RN to apply heel lift boots upon arrival to floor.     Discussed POC with patient and primary RN.   See EMR for orders & patient education.      Nursing to continue care.  Nursing to maintain pressure injury prevention interventions.               05/04/23 1034   WOCN Assessment   WOCN Total Time (mins) 15   Visit Date 05/04/23   Visit Time 1034   Consult Type New   WOCN Speciality Wound   Intervention assessed;changed;applied;chart review;coordination of care;orders   Teaching on-going        Altered Skin Integrity 05/03/23 1200 Right Coccyx Intact skin with non-blanchable redness of localized area   Date First Assessed/Time First Assessed: 05/03/23 1200   Altered Skin Integrity Present on Admission - Did Patient arrive to the hospital with altered skin?: suspected hospital acquired  Side: Right  Location: Coccyx  Description of Altered Skin Integ...   Wound Image    Dressing Appearance Intact;Dried drainage   Drainage Amount Scant   Drainage Characteristics/Odor Clear;Green   Appearance Pink;Red;Moist   Tissue loss description Partial thickness   Periwound Area Intact;Moist   Wound Edges Irregular   Care Cleansed with:;Soap and water;Applied:;Skin Barrier
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
## 2023-05-04 NOTE — CODE/ RAPID DOCUMENTATION
RAPID RESPONSE NURSE NOTE        Admit Date: 2023  LOS: 2  Code Status: Full Code   Date of Consult: 2023  : 1955  Age: 68 y.o.  Weight:   Wt Readings from Last 1 Encounters:   23 64.3 kg (141 lb 12.1 oz)     Sex: male  Race: White   [facility]:   MRN: [id number]  Time Rapid Response Team page Received: 16:20  Time Rapid Response Team at Bedside: 16:23  Time Rapid Response Team left Bedside: 17:15  Was the patient discharged from an ICU this admission? No   Was the patient discharged from a PACU within last 24 hours? No   Did the patient receive conscious sedation/general anesthesia in last 24 hours? No  Was the patient in the ED within the past 24 hours? No  Was the patient on NIPPV within the past 24 hours? No   Did this progress into an ARC or CPA: No  Attending Physician: BRISA Maurer MD  Primary Service: Colon and Rectal Surgery       SITUATION    Notified by bedside RN via phone call.  Reason for alert: Acute Respiratory Distress  Called to evaluate the patient for Respiratory    BACKGROUND     Why is the patient in the hospital?: Small bowel obstruction    Patient has a past medical history of Diabetes, Hyperlipidemia, Rectal cancer, and Seizure.    Last Vitals:  Temp: 98.1 °F (36.7 °C) ( 1538)  Pulse: 96 ( 1630)  Resp: 28 ( 1627)  BP: 204/99 ( 1630)  SpO2: 98 % ( 1630)    24 Hours Vitals Range:  Temp:  [97.6 °F (36.4 °C)-98.6 °F (37 °C)]   Pulse:  [79-98]   Resp:  [16-28]   BP: (108-204)/(65-99)   SpO2:  [82 %-98 %]     Labs:  Recent Labs     23  1124 23  0544   WBC 8.56 8.25   HGB 8.3* 7.4*   HCT 28.7* 25.5*    275       Recent Labs     23  0518 23  0544 23  0334 23  0815    140 143  --    K 3.7 3.7 3.6  --    * 111* 114*  --    CO2 21* 18* 23  --    CREATININE 0.7 0.7 0.7  --    * 122* 179*  --    PHOS 2.6* 2.5*  --  2.0*   MG 1.4* 1.4*  --  1.4*        No results for input(s): PH,  PCO2, PO2, HCO3, POCSATURATED, BE in the last 72 hours.     ASSESSMENT    The Rapid Response Team was called to assist with this patient who was experiencing acute respiratory distress and hypertension. Upon our arrival to the bedside, the patient was sitting upright in bed, alert and fully oriented. His work of breathing was notably increased with breathless speech noted. He was tachypneic with increased oxygen requirements (1 L NC to 6L NC) and hypertensive (-210). His abdomen appears distended and taut. The NGT is connected to LIWS with no output noted today. KUB and Chest X-Ray ordered. Dr. Estevez with the primary team arrived to the bedside and reviewed the X-Rays and performed a full patient assessment. The NGT was advanced and another KUB was performed. Dr. Estevez reviewed the repeat KUB and noted the NGT placement to be adequate. The NGT was flushed with 100 mL of sterile water and 250 mL return of clear, thick, frothy output was noted. A new nursing order was placed for scheduled NGT flushes. VBG was obtained, results reviewed by MD, and noted to be unremarkable.The patient's BP, HR, and work of breathing dramatically improved. The patient was positioned to comfort and plan of care was reviewed with the patient, family, and staff.       Physical Exam  Eyes:      Pupils: Pupils are equal, round, and reactive to light.   Cardiovascular:      Rate and Rhythm: Normal rate and regular rhythm.   Pulmonary:      Effort: Respiratory distress present.      Breath sounds: No wheezing.   Abdominal:      General: There is distension.   Skin:     General: Skin is warm and dry.      Capillary Refill: Capillary refill takes less than 2 seconds.   Neurological:      Mental Status: He is alert and oriented to person, place, and time. Mental status is at baseline.       INTERVENTIONS    See above.    RECOMMENDATIONS    We recommend: Implement new nursing orders. Continue to monitor VS and respiratory status.      PROVIDER ESCALATION    Orders received and case discussed with Dr. Estevez .    Primary team arrival time: 16:50    Disposition: Remain in room 1002.    FOLLOW UP    Charge RN, Estella and Bedside RNAngie  updated on plan of care. Instructed to call the Rapid Response Nurse, Tiara Leary RN at [phone] for additional questions or concerns.

## 2023-05-04 NOTE — SUBJECTIVE & OBJECTIVE
Subjective:     Interval History: NAEON. NGT functioning, no output since yesterday night shift. Brother at bedside. Denies N/V.    Post-Op Info:  * No surgery found *          Medications:  Continuous Infusions:   dextrose 5 % and 0.9 % NaCl 125 mL/hr at 05/04/23 0538     Scheduled Meds:   dorzolamide  1 drop Both Eyes BID    enoxaparin  40 mg Subcutaneous Daily    latanoprost  1 drop Both Eyes QHS    levetiracetam IV  750 mg Intravenous Q12H    magnesium sulfate IVPB  4 g Intravenous Q2H    potassium phosphate IVPB  30 mmol Intravenous Q4H    timolol maleate 0.5%  1 drop Both Eyes BID     PRN Meds:   dextrose 10%    dextrose 10%    glucagon (human recombinant)    HYDROmorphone    ibuprofen    insulin aspart U-100    LIDOcaine (PF) 10 mg/ml (1%)    ondansetron    sodium chloride 0.9%        Objective:     Vital Signs (Most Recent):  Temp: 97.7 °F (36.5 °C) (05/04/23 0423)  Pulse: 88 (05/04/23 0423)  Resp: 16 (05/04/23 0423)  BP: 136/65 (05/04/23 0423)  SpO2: (!) 91 % (05/04/23 0423)   Vital Signs (24h Range):  Temp:  [96.8 °F (36 °C)-98.8 °F (37.1 °C)] 97.7 °F (36.5 °C)  Pulse:  [77-88] 88  Resp:  [16-20] 16  SpO2:  [91 %-98 %] 91 %  BP: (108-147)/(65-80) 136/65     Intake/Output - Last 3 Shifts         05/02 0700  05/03 0659 05/03 0700  05/04 0659 05/04 0700  05/05 0659    I.V. (mL/kg)  1175.5 (18.3)     Total Intake(mL/kg)  1175.5 (18.3)     Urine (mL/kg/hr)  375 (0.2)     Stool  0     Total Output  375     Net  +800.5            Urine Occurrence 3 x 1 x     Stool Occurrence 0 x 0 x            Physical Exam  Constitutional:       General: He is not in acute distress.  HENT:      Nose:      Comments: NGT in place with no output  Cardiovascular:      Rate and Rhythm: Normal rate and regular rhythm.   Pulmonary:      Effort: Pulmonary effort is normal. No respiratory distress.      Breath sounds: Normal breath sounds.   Abdominal:      General: There is increased distension from yesterday     Tenderness:  Tenderness: none There is no guarding.      Comments: End colostomy with bowel sweat in appliance, flecks of stool noted, mucosa pink and healthy   Skin:     General: Skin is warm and dry.   Neurological:      Mental Status: Mental status is at baseline.     Significant Labs:  BMP (Last 3 Results):   Recent Labs   Lab 05/02/23  1124 05/03/23  0518 05/03/23  0544 05/04/23  0334   * 126* 122* 179*    141 140 143   K 4.2 3.7 3.7 3.6   * 111* 111* 114*   CO2 20* 21* 18* 23   BUN 26* 20 20 16   CREATININE 0.8 0.7 0.7 0.7   CALCIUM 8.5* 8.0* 7.8* 7.7*   MG 1.6 1.4* 1.4*  --      CBC (Last 3 Results):   Recent Labs   Lab 05/02/23  1124 05/03/23  0544   WBC 8.56 8.25   RBC 3.49* 3.08*   HGB 8.3* 7.4*   HCT 28.7* 25.5*    275   MCV 82 83   MCH 23.8* 24.0*   MCHC 28.9* 29.0*       Significant Diagnostics:  None

## 2023-05-04 NOTE — PLAN OF CARE
[facility] Plan of Care Note  Dx SMALL BOWEL OBS     Shift Events      Goals of Care:STOMACH DECOMPRESSION, PAIN CONTROL, NO N/V     Neuro: ALERTT AND ORIENTED X 3                Vital Signs: WNL     Respiratory: 1 L NC         Diet: NPO     Is patient tolerating current diet? NA     GTTS: LR AT 75 ML/HR     Urine Output/Bowel Movement: BREIF INCONTINENCE X2       Drains/Tubes/Tube Feeds (include total output/shift):  NG TUBE TO LIWS 580  Lines: YUE 20 G     Accuchecks: EVERY 6 HOURS     Skin: INCISION TO ABDOMEN AREA TO BUTTOCKS MEPILEX TO COCCYX     Fall Risk Score: 11     Activity level? BED BOUND     Any scheduled procedures? NA     Any safety concerns? BED ALARM IN PLACE     Other: NA

## 2023-05-05 LAB
ALBUMIN SERPL BCP-MCNC: 2.3 G/DL (ref 3.5–5.2)
ALBUMIN SERPL BCP-MCNC: 2.4 G/DL (ref 3.5–5.2)
ALP SERPL-CCNC: 65 U/L (ref 55–135)
ALP SERPL-CCNC: 66 U/L (ref 55–135)
ALT SERPL W/O P-5'-P-CCNC: 8 U/L (ref 10–44)
ALT SERPL W/O P-5'-P-CCNC: 9 U/L (ref 10–44)
ANION GAP SERPL CALC-SCNC: 7 MMOL/L (ref 8–16)
ANION GAP SERPL CALC-SCNC: 9 MMOL/L (ref 8–16)
AST SERPL-CCNC: 10 U/L (ref 10–40)
AST SERPL-CCNC: 9 U/L (ref 10–40)
BASOPHILS # BLD AUTO: 0.03 K/UL (ref 0–0.2)
BASOPHILS NFR BLD: 0.2 % (ref 0–1.9)
BILIRUB SERPL-MCNC: 0.3 MG/DL (ref 0.1–1)
BILIRUB SERPL-MCNC: 0.4 MG/DL (ref 0.1–1)
BNP SERPL-MCNC: 355 PG/ML (ref 0–99)
BUN SERPL-MCNC: 8 MG/DL (ref 8–23)
BUN SERPL-MCNC: 9 MG/DL (ref 8–23)
CALCIUM SERPL-MCNC: 8 MG/DL (ref 8.7–10.5)
CALCIUM SERPL-MCNC: 8.1 MG/DL (ref 8.7–10.5)
CHLORIDE SERPL-SCNC: 111 MMOL/L (ref 95–110)
CHLORIDE SERPL-SCNC: 111 MMOL/L (ref 95–110)
CO2 SERPL-SCNC: 21 MMOL/L (ref 23–29)
CO2 SERPL-SCNC: 24 MMOL/L (ref 23–29)
CREAT SERPL-MCNC: 0.6 MG/DL (ref 0.5–1.4)
CREAT SERPL-MCNC: 0.6 MG/DL (ref 0.5–1.4)
DIFFERENTIAL METHOD: ABNORMAL
EOSINOPHIL # BLD AUTO: 0.2 K/UL (ref 0–0.5)
EOSINOPHIL NFR BLD: 1.4 % (ref 0–8)
ERYTHROCYTE [DISTWIDTH] IN BLOOD BY AUTOMATED COUNT: 15.5 % (ref 11.5–14.5)
EST. GFR  (NO RACE VARIABLE): >60 ML/MIN/1.73 M^2
EST. GFR  (NO RACE VARIABLE): >60 ML/MIN/1.73 M^2
GLUCOSE SERPL-MCNC: 171 MG/DL (ref 70–110)
GLUCOSE SERPL-MCNC: 176 MG/DL (ref 70–110)
HCT VFR BLD AUTO: 28.7 % (ref 40–54)
HGB BLD-MCNC: 8.3 G/DL (ref 14–18)
IMM GRANULOCYTES # BLD AUTO: 0.06 K/UL (ref 0–0.04)
IMM GRANULOCYTES NFR BLD AUTO: 0.4 % (ref 0–0.5)
LYMPHOCYTES # BLD AUTO: 0.8 K/UL (ref 1–4.8)
LYMPHOCYTES NFR BLD: 5.6 % (ref 18–48)
MAGNESIUM SERPL-MCNC: 1.8 MG/DL (ref 1.6–2.6)
MCH RBC QN AUTO: 24.1 PG (ref 27–31)
MCHC RBC AUTO-ENTMCNC: 28.9 G/DL (ref 32–36)
MCV RBC AUTO: 83 FL (ref 82–98)
MONOCYTES # BLD AUTO: 0.9 K/UL (ref 0.3–1)
MONOCYTES NFR BLD: 6.7 % (ref 4–15)
NEUTROPHILS # BLD AUTO: 11.9 K/UL (ref 1.8–7.7)
NEUTROPHILS NFR BLD: 85.7 % (ref 38–73)
NRBC BLD-RTO: 0 /100 WBC
PHOSPHATE SERPL-MCNC: 2.1 MG/DL (ref 2.7–4.5)
PLATELET # BLD AUTO: 343 K/UL (ref 150–450)
PMV BLD AUTO: 8.8 FL (ref 9.2–12.9)
POCT GLUCOSE: 166 MG/DL (ref 70–110)
POCT GLUCOSE: 178 MG/DL (ref 70–110)
POCT GLUCOSE: 182 MG/DL (ref 70–110)
POCT GLUCOSE: 198 MG/DL (ref 70–110)
POCT GLUCOSE: 206 MG/DL (ref 70–110)
POTASSIUM SERPL-SCNC: 4.1 MMOL/L (ref 3.5–5.1)
POTASSIUM SERPL-SCNC: 4.2 MMOL/L (ref 3.5–5.1)
PROT SERPL-MCNC: 5.8 G/DL (ref 6–8.4)
PROT SERPL-MCNC: 6.1 G/DL (ref 6–8.4)
RBC # BLD AUTO: 3.44 M/UL (ref 4.6–6.2)
SODIUM SERPL-SCNC: 141 MMOL/L (ref 136–145)
SODIUM SERPL-SCNC: 142 MMOL/L (ref 136–145)
WBC # BLD AUTO: 13.86 K/UL (ref 3.9–12.7)

## 2023-05-05 PROCEDURE — 25000003 PHARM REV CODE 250

## 2023-05-05 PROCEDURE — 63600175 PHARM REV CODE 636 W HCPCS: Performed by: STUDENT IN AN ORGANIZED HEALTH CARE EDUCATION/TRAINING PROGRAM

## 2023-05-05 PROCEDURE — 80053 COMPREHEN METABOLIC PANEL: CPT | Mod: 91 | Performed by: STUDENT IN AN ORGANIZED HEALTH CARE EDUCATION/TRAINING PROGRAM

## 2023-05-05 PROCEDURE — 80053 COMPREHEN METABOLIC PANEL: CPT | Performed by: COLON & RECTAL SURGERY

## 2023-05-05 PROCEDURE — 85025 COMPLETE CBC W/AUTO DIFF WBC: CPT | Performed by: STUDENT IN AN ORGANIZED HEALTH CARE EDUCATION/TRAINING PROGRAM

## 2023-05-05 PROCEDURE — 20600001 HC STEP DOWN PRIVATE ROOM

## 2023-05-05 PROCEDURE — 63600175 PHARM REV CODE 636 W HCPCS

## 2023-05-05 PROCEDURE — 83735 ASSAY OF MAGNESIUM: CPT | Performed by: STUDENT IN AN ORGANIZED HEALTH CARE EDUCATION/TRAINING PROGRAM

## 2023-05-05 PROCEDURE — 36415 COLL VENOUS BLD VENIPUNCTURE: CPT | Performed by: COLON & RECTAL SURGERY

## 2023-05-05 PROCEDURE — 27000221 HC OXYGEN, UP TO 24 HOURS

## 2023-05-05 PROCEDURE — 84100 ASSAY OF PHOSPHORUS: CPT | Performed by: STUDENT IN AN ORGANIZED HEALTH CARE EDUCATION/TRAINING PROGRAM

## 2023-05-05 PROCEDURE — 99900035 HC TECH TIME PER 15 MIN (STAT)

## 2023-05-05 PROCEDURE — 25500020 PHARM REV CODE 255: Performed by: COLON & RECTAL SURGERY

## 2023-05-05 PROCEDURE — 94761 N-INVAS EAR/PLS OXIMETRY MLT: CPT

## 2023-05-05 PROCEDURE — 83880 ASSAY OF NATRIURETIC PEPTIDE: CPT | Performed by: STUDENT IN AN ORGANIZED HEALTH CARE EDUCATION/TRAINING PROGRAM

## 2023-05-05 PROCEDURE — 25000003 PHARM REV CODE 250: Performed by: STUDENT IN AN ORGANIZED HEALTH CARE EDUCATION/TRAINING PROGRAM

## 2023-05-05 PROCEDURE — 25000242 PHARM REV CODE 250 ALT 637 W/ HCPCS: Performed by: STUDENT IN AN ORGANIZED HEALTH CARE EDUCATION/TRAINING PROGRAM

## 2023-05-05 PROCEDURE — 36415 COLL VENOUS BLD VENIPUNCTURE: CPT | Performed by: STUDENT IN AN ORGANIZED HEALTH CARE EDUCATION/TRAINING PROGRAM

## 2023-05-05 PROCEDURE — 63600175 PHARM REV CODE 636 W HCPCS: Performed by: COLON & RECTAL SURGERY

## 2023-05-05 RX ORDER — GLUCAGON 1 MG
1 KIT INJECTION
Status: DISCONTINUED | OUTPATIENT
Start: 2023-05-05 | End: 2023-05-11 | Stop reason: HOSPADM

## 2023-05-05 RX ORDER — IPRATROPIUM BROMIDE AND ALBUTEROL SULFATE 2.5; .5 MG/3ML; MG/3ML
3 SOLUTION RESPIRATORY (INHALATION) EVERY 6 HOURS
Status: DISCONTINUED | OUTPATIENT
Start: 2023-05-05 | End: 2023-05-11 | Stop reason: HOSPADM

## 2023-05-05 RX ORDER — INSULIN ASPART 100 [IU]/ML
0-5 INJECTION, SOLUTION INTRAVENOUS; SUBCUTANEOUS EVERY 6 HOURS PRN
Status: DISCONTINUED | OUTPATIENT
Start: 2023-05-05 | End: 2023-05-11 | Stop reason: HOSPADM

## 2023-05-05 RX ORDER — DEXTROSE MONOHYDRATE, SODIUM CHLORIDE, AND POTASSIUM CHLORIDE 50; 1.49; 4.5 G/1000ML; G/1000ML; G/1000ML
INJECTION, SOLUTION INTRAVENOUS CONTINUOUS
Status: DISCONTINUED | OUTPATIENT
Start: 2023-05-05 | End: 2023-05-05

## 2023-05-05 RX ORDER — FUROSEMIDE 10 MG/ML
20 INJECTION INTRAMUSCULAR; INTRAVENOUS ONCE
Status: COMPLETED | OUTPATIENT
Start: 2023-05-05 | End: 2023-05-05

## 2023-05-05 RX ORDER — FUROSEMIDE 10 MG/ML
40 INJECTION INTRAMUSCULAR; INTRAVENOUS ONCE
Status: COMPLETED | OUTPATIENT
Start: 2023-05-05 | End: 2023-05-05

## 2023-05-05 RX ORDER — MAGNESIUM SULFATE HEPTAHYDRATE 40 MG/ML
4 INJECTION, SOLUTION INTRAVENOUS ONCE
Status: COMPLETED | OUTPATIENT
Start: 2023-05-05 | End: 2023-05-05

## 2023-05-05 RX ADMIN — LEVETIRACETAM 750 MG: 100 INJECTION, SOLUTION INTRAVENOUS at 09:05

## 2023-05-05 RX ADMIN — LORAZEPAM 0.5 MG: 2 INJECTION INTRAMUSCULAR; INTRAVENOUS at 01:05

## 2023-05-05 RX ADMIN — FUROSEMIDE 20 MG: 10 INJECTION, SOLUTION INTRAMUSCULAR; INTRAVENOUS at 03:05

## 2023-05-05 RX ADMIN — SODIUM PHOSPHATE, MONOBASIC, MONOHYDRATE AND SODIUM PHOSPHATE, DIBASIC, ANHYDROUS 39.99 MMOL: 276; 142 INJECTION, SOLUTION INTRAVENOUS at 10:05

## 2023-05-05 RX ADMIN — HYDROMORPHONE HYDROCHLORIDE 0.2 MG: 1 INJECTION, SOLUTION INTRAMUSCULAR; INTRAVENOUS; SUBCUTANEOUS at 01:05

## 2023-05-05 RX ADMIN — FUROSEMIDE 40 MG: 10 INJECTION, SOLUTION INTRAMUSCULAR; INTRAVENOUS at 04:05

## 2023-05-05 RX ADMIN — MAGNESIUM SULFATE 4 G: 2 INJECTION INTRAVENOUS at 07:05

## 2023-05-05 RX ADMIN — IOHEXOL 100 ML: 350 INJECTION, SOLUTION INTRAVENOUS at 08:05

## 2023-05-05 RX ADMIN — IPRATROPIUM BROMIDE AND ALBUTEROL SULFATE 3 ML: .5; 3 SOLUTION RESPIRATORY (INHALATION) at 03:05

## 2023-05-05 RX ADMIN — ENOXAPARIN SODIUM 40 MG: 40 INJECTION SUBCUTANEOUS at 06:05

## 2023-05-05 RX ADMIN — LIDOCAINE HYDROCHLORIDE 10 MG: 10 INJECTION, SOLUTION EPIDURAL; INFILTRATION; INTRACAUDAL; PERINEURAL at 04:05

## 2023-05-05 RX ADMIN — TIMOLOL MALEATE 1 DROP: 5 SOLUTION OPHTHALMIC at 09:05

## 2023-05-05 RX ADMIN — INSULIN ASPART 2 UNITS: 100 INJECTION, SOLUTION INTRAVENOUS; SUBCUTANEOUS at 07:05

## 2023-05-05 RX ADMIN — DORZOLAMIDE 1 DROP: 20 SOLUTION/ DROPS OPHTHALMIC at 10:05

## 2023-05-05 RX ADMIN — DORZOLAMIDE 1 DROP: 20 SOLUTION/ DROPS OPHTHALMIC at 09:05

## 2023-05-05 RX ADMIN — LEVETIRACETAM 750 MG: 100 INJECTION, SOLUTION INTRAVENOUS at 10:05

## 2023-05-05 RX ADMIN — TIMOLOL MALEATE 1 DROP: 5 SOLUTION OPHTHALMIC at 10:05

## 2023-05-05 RX ADMIN — DEXTROSE, SODIUM CHLORIDE, AND POTASSIUM CHLORIDE: 5; .45; .15 INJECTION INTRAVENOUS at 11:05

## 2023-05-05 RX ADMIN — PIPERACILLIN SODIUM AND TAZOBACTAM SODIUM 4.5 G: 4; .5 INJECTION, POWDER, FOR SOLUTION INTRAVENOUS at 07:05

## 2023-05-05 RX ADMIN — LATANOPROST 1 DROP: 50 SOLUTION OPHTHALMIC at 10:05
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
## 2023-05-05 NOTE — CODE/ RAPID DOCUMENTATION
Rapid Response Nurse Follow-up Note     Followed up with patient for a rapid response.  No acute issues or additional concerns at this time. The patient remains stable per his baseline. Reviewed plan of care with charge RN, Estella .   Please call Rapid Response RN, Tiara Leary RN if any additional monitoring required or questions or concerns arise at [phone].
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
## 2023-05-05 NOTE — SUBJECTIVE & OBJECTIVE
Subjective:     Interval History: Became agitated around 1600 yesterday, NGT bothering patient. Became hypertensive, requiring 6L NC. NGT appeared to be retracted, was advanced and flushed with return of thick clear secretions. Patient returned to baseline 1L NC. Last night at 0300, patient became agitated and pulled NGT and IV. As ostomy has been productive with NGT output clearing and decreasing, NGT was not replaced. Patient resting well this morning, denies N/V. Afebrile.    Post-Op Info:  * No surgery found *          Medications:  Continuous Infusions:   lactated ringers 100 mL/hr at 05/04/23 1825     Scheduled Meds:   dorzolamide  1 drop Both Eyes BID    enoxaparin  40 mg Subcutaneous Daily    latanoprost  1 drop Both Eyes QHS    levetiracetam IV  750 mg Intravenous Q12H    timolol maleate 0.5%  1 drop Both Eyes BID     PRN Meds:   dextrose 10%    dextrose 10%    glucagon (human recombinant)    HYDROmorphone    ibuprofen    insulin aspart U-100    lorazepam    ondansetron    sodium chloride 0.9%        Objective:     Vital Signs (Most Recent):  Temp: 97.5 °F (36.4 °C) (05/05/23 0335)  Pulse: 95 (05/05/23 0335)  Resp: (!) 22 (05/05/23 0335)  BP: (!) 154/80 (05/05/23 0335)  SpO2: (!) 93 % (05/05/23 0335)   Vital Signs (24h Range):  Temp:  [97.5 °F (36.4 °C)-98.3 °F (36.8 °C)] 97.5 °F (36.4 °C)  Pulse:  [81-98] 95  Resp:  [18-28] 22  SpO2:  [82 %-98 %] 93 %  BP: (141-204)/(67-99) 154/80     Intake/Output - Last 3 Shifts         05/03 0700  05/04 0659 05/04 0700  05/05 0659 05/05 0700  05/06 0659    P.O.  0     I.V. (mL/kg) 1175.5 (18.3)      Total Intake(mL/kg) 1175.5 (18.3) 0 (0)     Urine (mL/kg/hr) 375 (0.2) 625 (0.4)     Stool 0      Total Output 375 625     Net +800.5 -625            Urine Occurrence 1 x      Stool Occurrence 0 x              Physical Exam  Constitutional:       General: He is not in acute distress.  HENT:      Nose:      Comments: NGT in place with no output  Cardiovascular:      Rate  and Rhythm: Normal rate and regular rhythm.   Pulmonary:      Effort: Pulmonary effort is normal. No respiratory distress.      Breath sounds: Normal breath sounds.   Abdominal:      General: There is distension      Tenderness: Tenderness: none There is no guarding.      Comments: End colostomy with flecks of stool noted, mucosa pink and healthy   Skin:     General: Skin is warm and dry.   Neurological:      Mental Status: Mental status is at baseline    Significant Labs:  BMP (Last 3 Results):   Recent Labs   Lab 05/03/23  0518 05/03/23  0544 05/04/23  0334 05/04/23  0815   * 122* 179*  --     140 143  --    K 3.7 3.7 3.6  --    * 111* 114*  --    CO2 21* 18* 23  --    BUN 20 20 16  --    CREATININE 0.7 0.7 0.7  --    CALCIUM 8.0* 7.8* 7.7*  --    MG 1.4* 1.4*  --  1.4*     CBC (Last 3 Results):   Recent Labs   Lab 05/02/23  1124 05/03/23  0544   WBC 8.56 8.25   RBC 3.49* 3.08*   HGB 8.3* 7.4*   HCT 28.7* 25.5*    275   MCV 82 83   MCH 23.8* 24.0*   MCHC 28.9* 29.0*       Significant Diagnostics:  None

## 2023-05-05 NOTE — RESPIRATORY THERAPY
RAPID RESPONSE RESPIRATORY THERAPY PROACTIVE NOTE           Time of visit: 1610     Code Status: Full Code   : 1955  Bed: 1002/1002 A:   MRN: [id number]  Time spent at the bedside: 15 -30 min    SITUATION    Evaluated patient for: Respiratory    BACKGROUND    Why is the patient in the hospital?: Small bowel obstruction    Patient has a past medical history of Diabetes, Hyperlipidemia, Rectal cancer, and Seizure.    24 Hours Vitals Range:  Temp:  [97.5 °F (36.4 °C)-100.3 °F (37.9 °C)]   Pulse:  [90-98]   Resp:  [18-22]   BP: (141-169)/(67-94)   SpO2:  [90 %-96 %]     Labs:    Recent Labs     23  0544 23  0334 23  0815 23  1455 23  1613    143  --  141 142   K 3.7 3.6  --  4.2 4.1   * 114*  --  111* 111*   CO2 18* 23  --  21* 24   CREATININE 0.7 0.7  --  0.6 0.6   * 179*  --  176* 171*   PHOS 2.5*  --  2.0* 2.1*  --    MG 1.4*  --  1.4* 1.8  --         Recent Labs     23  1716   PH 7.344*   PCO2 46.5*   PO2 30*   HCO3 25.3   POCSATURATED 53*   BE 0       ASSESSMENT/INTERVENTIONS  Called by bedside RT to assess pt for increase in WOB with audible wheezing with no improvement following breathing treatment. On assessment pt labored with audible upper airway wheezing, lungs clear bilaterally on auscultation. MD at bedside with orders for NGT placement and CT scan.    Last VS   Temp: 100.3 °F (37.9 °C) (1532)  Pulse: 95 (1532)  Resp: 20 (1532)  BP: 154/94 (1532)  SpO2: 95 % (1532)    Level of Consciousness: Level of Consciousness (AVPU): responds to voice  Respiratory Effort: Respiratory Effort: Moderate, Labored Expansion/Accessory Muscle Usage: Expansion/Accessory Muscles/Retractions: accessory muscle use  All Lung Field Breath Sounds: All Lung Fields Breath Sounds: clear, diminished (expiratory wheezes heard on auscultation of trachea; clear/diminished BBS)  O2 Device/Concentration: NC 2L   NIPPV: No Surgical airway: No  ETCO2  monitored:    Ambu at bedside: Ambu bag with the patient?: Yes, Adult Ambu    Active Orders   Respiratory Care    Chest physiotherapy Q6H     Frequency: Q6H     Number of Occurrences: Until Specified     Order Comments: Cant perform Acapella or IS due to mental status       Order Questions:      Indications: ATELECTASIS PROPHYLAXIS    Inhalation Treatment Q6H     Frequency: Q6H     Number of Occurrences: Until Specified    Oxygen Continuous     Frequency: Continuous     Number of Occurrences: Until Specified     Order Questions:      Device type: Low flow      Device: Nasal Cannula (1- 5 Liters)      LPM: 2      Titrate O2 per Oxygen Titration Protocol: Yes      To maintain SpO2 goal of: >= 90%    Pulse Oximetry Q4H     Frequency: Q4H     Number of Occurrences: Until Specified       RECOMMENDATIONS    We recommend: RRT Recs: Continue POC per primary team.    FOLLOW-UP    Please call back the Rapid Response RT, Librado Ramos, RRT at x [phone] for any questions or concerns.

## 2023-05-05 NOTE — CARE UPDATE
RAPID RESPONSE NURSE PROACTIVE ROUNDING NOTE       Time of Visit: 1610    Admit Date: 2023  LOS: 3  Code Status: Full Code   Date of Visit: 2023  : 1955  Age: 68 y.o.  Sex: male  Race: White  Bed: 1002/1002 A:   MRN: [id number]  Was the patient discharged from an ICU this admission? No   Was the patient discharged from a PACU within last 24 hours? No   Did the patient receive conscious sedation/general anesthesia in last 24 hours? No  Was the patient in the ED within the past 24 hours? No  Was the patient on NIPPV within the past 24 hours? No   Attending Physician: BRISA Maurer MD  Primary Service: Colon and Rectal Surgery   Time spent at the bedside: 15 -30 min    SITUATION    Notified by Rapid Response RT.  Reason for alert: Increased WOB  Called to evaluate the patient for Respiratory    BACKGROUND     Why is the patient in the hospital?: Small bowel obstruction    Patient has a past medical history of Diabetes, Hyperlipidemia, Rectal cancer, and Seizure.    Last Vitals:  Temp: 100.3 °F (37.9 °C) (1532)  Pulse: 95 (1532)  Resp: 20 (1532)  BP: 154/94 (1532)  SpO2: 95 % (1532)    24 Hours Vitals Range:  Temp:  [97.5 °F (36.4 °C)-100.3 °F (37.9 °C)]   Pulse:  [90-98]   Resp:  [18-22]   BP: (141-169)/(67-94)   SpO2:  [90 %-96 %]     Labs:  Recent Labs     23  0544 23  1456   WBC 8.25 13.86*   HGB 7.4* 8.3*   HCT 25.5* 28.7*    343       Recent Labs     23  0544 23  0334 23  0815 23  1455 23  1613    143  --  141 142   K 3.7 3.6  --  4.2 4.1   * 114*  --  111* 111*   CO2 18* 23  --  21* 24   CREATININE 0.7 0.7  --  0.6  --    * 179*  --  176* 171*   PHOS 2.5*  --  2.0* 2.1*  --    MG 1.4*  --  1.4* 1.8  --         Recent Labs     23  1716   PH 7.344*   PCO2 46.5*   PO2 30*   HCO3 25.3   POCSATURATED 53*   BE 0        ASSESSMENT    Physical Exam  Vitals and nursing note reviewed.   Pulmonary:       Effort: Accessory muscle usage present.      Breath sounds: Wheezing present.      Comments: Labored   Abdominal:      General: There is distension.      Palpations: Abdomen is rigid.      Tenderness: There is no abdominal tenderness.   Neurological:      Mental Status: He is alert.       INTERVENTIONS    The patient was seen for Respiratory problem. Staff concerns included increased WOB. The following interventions were performed: NGT, CT scan.    RECOMMENDATIONS    POC per primary   NGT for decompression   CT scan    PROVIDER ESCALATION    Yes/No  Yes    Orders received and case discussed with Dr. Jaeger .    Disposition: Remain in room 1002.    FOLLOW-UP    Bedside RNAdri  updated on plan of care. Instructed to call the Rapid Response Nurse, Norma Siegel RN at [phone] for additional questions or concerns.

## 2023-05-05 NOTE — PLAN OF CARE
[facility] Plan of Care Note  Dx Sm Bowel Obs    Shift Events Call rapid nurses to bedside this afternoon.  Pt having a hard time breathing, B/P elevated, very anxious. Could not reach Mds, paged for over an hour.  Rapid finally got them. Got order for Lorazepam, which helped to calm him.  Advanced NG tube for better drainage.  NG tube to be flushed every 4 hours. Once it started draining better, he started feeling better, B/P went down, breathing leveled out.    Goals of Care: Accu cks ACHS, Triade cream to sacrum BID, NPO    Neuro: A O x 3 seemed to have a little sundowners tonight    Vital Signs: Stable on 1 L O2 x NC until the episode this afternoon    Respiratory: 1 L O2 x NC    Diet: NPO    Is patient tolerating current diet? N/A    GTTS: LR @ 100 mL/hr    Urine Output/Bowel Movement: Condom Cath    Drains/Tubes/Tube Feeds (include total output/shift): NG tube, Colostomy    Lines: 20g RAC      Accuchecks: q6h    Skin: sacrum some excoriation, no breakage    Fall Risk Score: Fall risk is bed bound    Activity level? Bed bound    Any scheduled procedures? none    Any safety concerns? Weight shifting     Other:

## 2023-05-05 NOTE — CARE UPDATE
"RAPID RESPONSE NURSE CHART REVIEW       Chart Reviewed: 05/05/2023, 10:52 AM    MRN: [id number]  Bed: 1002/1002 A    Dx: Small bowel obstruction    Yan Way has a past medical history of Diabetes, Hyperlipidemia, Rectal cancer, and Seizure.    Last VS: BP (!) 164/78 (BP Location: Left arm, Patient Position: Lying)   Pulse 98   Temp 99.4 °F (37.4 °C) (Axillary)   Resp 18   Ht 5' 8" (1.727 m)   Wt 64.3 kg (141 lb 12.1 oz)   SpO2 (!) 93%   BMI 21.55 kg/m²     24H Vital Sign Range:  Temp:  [97.5 °F (36.4 °C)-99.4 °F (37.4 °C)]   Pulse:  [81-98]   Resp:  [18-28]   BP: (141-204)/(67-99)   SpO2:  [82 %-98 %]     Level of Consciousness (AVPU): alert    Recent Labs     05/02/23  1124 05/03/23  0544   WBC 8.56 8.25   HGB 8.3* 7.4*   HCT 28.7* 25.5*    275       Recent Labs     05/03/23  0518 05/03/23  0544 05/04/23  0334 05/04/23  0815    140 143  --    K 3.7 3.7 3.6  --    * 111* 114*  --    CO2 21* 18* 23  --    CREATININE 0.7 0.7 0.7  --    * 122* 179*  --    PHOS 2.6* 2.5*  --  2.0*   MG 1.4* 1.4*  --  1.4*        Recent Labs     05/04/23  1716   PH 7.344*   PCO2 46.5*   PO2 30*   HCO3 25.3   POCSATURATED 53*   BE 0        OXYGEN:  Flow (L/min): 2          MEWS score: 1    charge RN, Isabela borrego. No concerns verbalized at this time. Instructed to call [phone] for further concerns or assistance.    Boone Mercado RN       "

## 2023-05-06 LAB
ANION GAP SERPL CALC-SCNC: 11 MMOL/L (ref 8–16)
BUN SERPL-MCNC: 7 MG/DL (ref 8–23)
CALCIUM SERPL-MCNC: 8 MG/DL (ref 8.7–10.5)
CHLORIDE SERPL-SCNC: 106 MMOL/L (ref 95–110)
CO2 SERPL-SCNC: 25 MMOL/L (ref 23–29)
CREAT SERPL-MCNC: 0.8 MG/DL (ref 0.5–1.4)
CRP SERPL-MCNC: 138 MG/L (ref 0–8.2)
ERYTHROCYTE [DISTWIDTH] IN BLOOD BY AUTOMATED COUNT: 15.6 % (ref 11.5–14.5)
EST. GFR  (NO RACE VARIABLE): >60 ML/MIN/1.73 M^2
GLUCOSE SERPL-MCNC: 170 MG/DL (ref 70–110)
HCT VFR BLD AUTO: 25.8 % (ref 40–54)
HGB BLD-MCNC: 7.7 G/DL (ref 14–18)
INR PPP: 1 (ref 0.8–1.2)
MAGNESIUM SERPL-MCNC: 2.4 MG/DL (ref 1.6–2.6)
MCH RBC QN AUTO: 24.1 PG (ref 27–31)
MCHC RBC AUTO-ENTMCNC: 29.8 G/DL (ref 32–36)
MCV RBC AUTO: 81 FL (ref 82–98)
PHOSPHATE SERPL-MCNC: 5.2 MG/DL (ref 2.7–4.5)
PLATELET # BLD AUTO: 336 K/UL (ref 150–450)
PMV BLD AUTO: 8.6 FL (ref 9.2–12.9)
POCT GLUCOSE: 152 MG/DL (ref 70–110)
POCT GLUCOSE: 178 MG/DL (ref 70–110)
POCT GLUCOSE: 180 MG/DL (ref 70–110)
POCT GLUCOSE: 240 MG/DL (ref 70–110)
POTASSIUM SERPL-SCNC: 3.7 MMOL/L (ref 3.5–5.1)
PROTHROMBIN TIME: 10.8 SEC (ref 9–12.5)
RBC # BLD AUTO: 3.19 M/UL (ref 4.6–6.2)
SODIUM SERPL-SCNC: 142 MMOL/L (ref 136–145)
WBC # BLD AUTO: 12.29 K/UL (ref 3.9–12.7)

## 2023-05-06 PROCEDURE — 99900035 HC TECH TIME PER 15 MIN (STAT)

## 2023-05-06 PROCEDURE — 85610 PROTHROMBIN TIME: CPT | Performed by: STUDENT IN AN ORGANIZED HEALTH CARE EDUCATION/TRAINING PROGRAM

## 2023-05-06 PROCEDURE — 25000003 PHARM REV CODE 250: Performed by: STUDENT IN AN ORGANIZED HEALTH CARE EDUCATION/TRAINING PROGRAM

## 2023-05-06 PROCEDURE — 36415 COLL VENOUS BLD VENIPUNCTURE: CPT | Performed by: STUDENT IN AN ORGANIZED HEALTH CARE EDUCATION/TRAINING PROGRAM

## 2023-05-06 PROCEDURE — 80048 BASIC METABOLIC PNL TOTAL CA: CPT | Performed by: COLON & RECTAL SURGERY

## 2023-05-06 PROCEDURE — 84100 ASSAY OF PHOSPHORUS: CPT | Performed by: COLON & RECTAL SURGERY

## 2023-05-06 PROCEDURE — 63600175 PHARM REV CODE 636 W HCPCS: Performed by: STUDENT IN AN ORGANIZED HEALTH CARE EDUCATION/TRAINING PROGRAM

## 2023-05-06 PROCEDURE — 36415 COLL VENOUS BLD VENIPUNCTURE: CPT | Performed by: COLON & RECTAL SURGERY

## 2023-05-06 PROCEDURE — 94640 AIRWAY INHALATION TREATMENT: CPT

## 2023-05-06 PROCEDURE — 86140 C-REACTIVE PROTEIN: CPT | Performed by: COLON & RECTAL SURGERY

## 2023-05-06 PROCEDURE — 85027 COMPLETE CBC AUTOMATED: CPT | Performed by: COLON & RECTAL SURGERY

## 2023-05-06 PROCEDURE — 25000242 PHARM REV CODE 250 ALT 637 W/ HCPCS: Performed by: STUDENT IN AN ORGANIZED HEALTH CARE EDUCATION/TRAINING PROGRAM

## 2023-05-06 PROCEDURE — 63600175 PHARM REV CODE 636 W HCPCS

## 2023-05-06 PROCEDURE — 27000221 HC OXYGEN, UP TO 24 HOURS

## 2023-05-06 PROCEDURE — 83735 ASSAY OF MAGNESIUM: CPT | Performed by: COLON & RECTAL SURGERY

## 2023-05-06 PROCEDURE — 20600001 HC STEP DOWN PRIVATE ROOM

## 2023-05-06 PROCEDURE — 94668 MNPJ CHEST WALL SBSQ: CPT

## 2023-05-06 PROCEDURE — 27100171 HC OXYGEN HIGH FLOW UP TO 24 HOURS

## 2023-05-06 PROCEDURE — 94761 N-INVAS EAR/PLS OXIMETRY MLT: CPT

## 2023-05-06 RX ORDER — FUROSEMIDE 10 MG/ML
20 INJECTION INTRAMUSCULAR; INTRAVENOUS ONCE
Status: COMPLETED | OUTPATIENT
Start: 2023-05-06 | End: 2023-05-06

## 2023-05-06 RX ADMIN — IPRATROPIUM BROMIDE AND ALBUTEROL SULFATE 3 ML: .5; 3 SOLUTION RESPIRATORY (INHALATION) at 08:05

## 2023-05-06 RX ADMIN — ENOXAPARIN SODIUM 40 MG: 40 INJECTION SUBCUTANEOUS at 04:05

## 2023-05-06 RX ADMIN — INSULIN ASPART 2 UNITS: 100 INJECTION, SOLUTION INTRAVENOUS; SUBCUTANEOUS at 07:05

## 2023-05-06 RX ADMIN — LEVETIRACETAM 750 MG: 100 INJECTION, SOLUTION INTRAVENOUS at 09:05

## 2023-05-06 RX ADMIN — LATANOPROST 1 DROP: 50 SOLUTION OPHTHALMIC at 09:05

## 2023-05-06 RX ADMIN — TIMOLOL MALEATE 1 DROP: 5 SOLUTION OPHTHALMIC at 09:05

## 2023-05-06 RX ADMIN — PIPERACILLIN SODIUM AND TAZOBACTAM SODIUM 4.5 G: 4; .5 INJECTION, POWDER, FOR SOLUTION INTRAVENOUS at 11:05

## 2023-05-06 RX ADMIN — IPRATROPIUM BROMIDE AND ALBUTEROL SULFATE 3 ML: .5; 3 SOLUTION RESPIRATORY (INHALATION) at 07:05

## 2023-05-06 RX ADMIN — PIPERACILLIN SODIUM AND TAZOBACTAM SODIUM 4.5 G: 4; .5 INJECTION, POWDER, FOR SOLUTION INTRAVENOUS at 06:05

## 2023-05-06 RX ADMIN — IPRATROPIUM BROMIDE AND ALBUTEROL SULFATE 3 ML: .5; 3 SOLUTION RESPIRATORY (INHALATION) at 12:05

## 2023-05-06 RX ADMIN — DORZOLAMIDE 1 DROP: 20 SOLUTION/ DROPS OPHTHALMIC at 09:05

## 2023-05-06 RX ADMIN — PIPERACILLIN SODIUM AND TAZOBACTAM SODIUM 4.5 G: 4; .5 INJECTION, POWDER, FOR SOLUTION INTRAVENOUS at 02:05

## 2023-05-06 RX ADMIN — FUROSEMIDE 20 MG: 10 INJECTION, SOLUTION INTRAMUSCULAR; INTRAVENOUS at 04:05
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
## 2023-05-06 NOTE — NURSING
Dx Sm Bowel Obs     Shift Events: per MD Pointer at shift change, advanced NGT to 70, w/ RN Adri in room. Pt off floor @ 1905 for CT scan, back @ 2030. weaned to 1L NC, no exp. Wheezing/ minimal OP from NGT, no restless, pt slept most of shift     Goals of Care: Accu cks ACHS, Triade cream to sacrum BID, NPO     Neuro: A O x 3 -4 asleep most of shift, but arousable and brother stated pt was more lucid after episode earlier in day     Vital Signs: Stable      Respiratory: 2 L NC>>> weaned to 1L NC     Diet: NPO + ice, tolerating one ice chip at a time     Is patient tolerating current diet? N/A     GTTS: fluids discontinued     Urine Output/Bowel Movement: Aleman OP clear yellow, but foul smelling- adequate OP     Drains/Tubes/Tube Feeds (include total output/shift): NG tube, LUQ Colostomy     Lines: 20# RFA, infusing IV zosyn     Accuchecks: q6h, treated this am     Skin: sacrum some excoriation     Fall Risk Score: Fall risk is bed bound     Activity level? Bed bound      notified on call MD Fior Myers after CT resulted, showed no air or SBO, NGT in proper place. Plus pt Vitally stable and resting at moment.  NO new orders. Stated team would readdress in AM.

## 2023-05-06 NOTE — SUBJECTIVE & OBJECTIVE
Interval History:   CT yesterday w/o free air or evidence of acute intra-abdominal pathology, bilateral pleural effusions present. Will d/w IR re: thoracentesis today. Continuing to diurese    Medications:  Continuous Infusions:  Scheduled Meds:   albuterol-ipratropium  3 mL Nebulization Q6H    dorzolamide  1 drop Both Eyes BID    enoxaparin  40 mg Subcutaneous Daily    latanoprost  1 drop Both Eyes QHS    levetiracetam IV  750 mg Intravenous Q12H    piperacillin-tazobactam (ZOSYN) IVPB  4.5 g Intravenous Q8H    timolol maleate 0.5%  1 drop Both Eyes BID     PRN Meds:dextrose 10%, dextrose 10%, dextrose 10%, glucagon (human recombinant), HYDROmorphone, ibuprofen, insulin aspart U-100, iohexol, ondansetron, sodium chloride 0.9%     Review of patient's allergies indicates:   Allergen Reactions    Latex, natural rubber Anxiety     Objective:     Vital Signs (Most Recent):  Temp: 97.8 °F (36.6 °C) (05/06/23 0754)  Pulse: 94 (05/06/23 0754)  Resp: 20 (05/06/23 0754)  BP: (!) 165/84 (05/06/23 0754)  SpO2: (!) 93 % (05/06/23 0754) Vital Signs (24h Range):  Temp:  [97.8 °F (36.6 °C)-100.3 °F (37.9 °C)] 97.8 °F (36.6 °C)  Pulse:  [86-96] 94  Resp:  [16-24] 20  SpO2:  [91 %-96 %] 93 %  BP: (140-169)/(73-94) 165/84     Weight: 64.3 kg (141 lb 12.1 oz)  Body mass index is 21.55 kg/m².    Intake/Output - Last 3 Shifts         05/04 0700  05/05 0659 05/05 0700 05/06 0659 05/06 0700  05/07 0659    P.O. 0 0     I.V. (mL/kg)       Total Intake(mL/kg) 0 (0) 0 (0)     Urine (mL/kg/hr) 925 (0.6) 3350 (2.2)     Drains  400     Other 0      Stool 100 100     Total Output 1025 3850     Net -1025 -3850            Urine Occurrence 1 x               Physical Exam   Constitutional:       General: He is not in acute distress.  HENT:      Nose:      Comments: NGT in place with no output  Cardiovascular:      Rate and Rhythm: Normal rate and regular rhythm.   Pulmonary:      Effort: Pulmonary effort is normal. No respiratory distress.       Breath sounds: Normal breath sounds.   Abdominal:      General: There is distension      Tenderness: Tenderness: none There is no guarding.      Comments: End colostomy with scant stool, mucosa pink and healthy   Skin:     General: Skin is warm and dry.   Neurological:      Mental Status: Mental status is at baseline  Significant Labs:  I have reviewed all pertinent lab results within the past 24 hours.    Significant Diagnostics:  I have reviewed all pertinent imaging results/findings within the past 24 hours.  X-Ray Chest AP Portable    Result Date: 5/5/2023  See above Electronically signed by: Nick Harrison MD Date:    05/05/2023 Time:    14:33    CT Chest Abdomen Pelvis With Contrast (xpd)    Result Date: 5/5/2023  1. Bilateral large pleural effusions and associated compressive atelectasis. 2. No free intraperitoneal air. 3. No bowel obstruction.  No pneumatosis. 4. Bowel wall thickening in the distal stomach/1st portion of the duodenum, suggestive of gastritis/duodenitis. 5. Similar size of presacral thin rim enhancing fluid collection with adjacent presacral fat stranding, possibly representing abscess, hematoma, or seroma. 6. Hepatic steatosis. 7. Moderate body wall edema, scrotal edema, mesenteric edema, and small volume of abdominopelvic free fluid suggestive of volume overload. 8. Additional findings, as above. This report was flagged in Epic as abnormal. Electronically signed by resident: Savannah Narvaez Date:    05/05/2023 Time:    20:55 Electronically signed by: Dvais Cross MD Date:    05/05/2023 Time:    22:02

## 2023-05-06 NOTE — PLAN OF CARE
Dx Sm Bowel Obs     Shift Events  Pt having a hard time breathing similar to yesterday. Stomach more distended, pt whizzing, but not in lungs. Augusto Jaeger MD ordered NG tube and coleman catheter. Followed by a CT with IV contrast. Still waiting for transport to come take him down.  NG at approximately 70 cm, to LIWS is now bringing up green bile. Pt seemed to be feeling better.     Goals of Care: Accu cks ACHS, Triade cream to sacrum BID, NPO     Neuro: A O x 3 seemed more lucid this afternoon     Vital Signs: Stable on 2 L O2 x NC     Respiratory: 2 L O2 x NC     Diet: NPO + ice     Is patient tolerating current diet? N/A     GTTS: fluids discontinued     Urine Output/Bowel Movement: adequate from condom cath, coleman placed for possible retention.  Had a smear of bloody stool.     Drains/Tubes/Tube Feeds (include total output/shift): NG tube, Colostomy     Lines: 20g RFA      Accuchecks: q6h     Skin: sacrum some excoriation     Fall Risk Score: Fall risk is bed bound     Activity level? Bed bound

## 2023-05-07 LAB
POCT GLUCOSE: 140 MG/DL (ref 70–110)
POCT GLUCOSE: 150 MG/DL (ref 70–110)
POCT GLUCOSE: 178 MG/DL (ref 70–110)
POCT GLUCOSE: 180 MG/DL (ref 70–110)

## 2023-05-07 PROCEDURE — 25000003 PHARM REV CODE 250: Performed by: STUDENT IN AN ORGANIZED HEALTH CARE EDUCATION/TRAINING PROGRAM

## 2023-05-07 PROCEDURE — 94761 N-INVAS EAR/PLS OXIMETRY MLT: CPT

## 2023-05-07 PROCEDURE — 25000242 PHARM REV CODE 250 ALT 637 W/ HCPCS: Performed by: STUDENT IN AN ORGANIZED HEALTH CARE EDUCATION/TRAINING PROGRAM

## 2023-05-07 PROCEDURE — 94640 AIRWAY INHALATION TREATMENT: CPT

## 2023-05-07 PROCEDURE — 94668 MNPJ CHEST WALL SBSQ: CPT

## 2023-05-07 PROCEDURE — 63600175 PHARM REV CODE 636 W HCPCS

## 2023-05-07 PROCEDURE — 20600001 HC STEP DOWN PRIVATE ROOM

## 2023-05-07 PROCEDURE — 63600175 PHARM REV CODE 636 W HCPCS: Performed by: STUDENT IN AN ORGANIZED HEALTH CARE EDUCATION/TRAINING PROGRAM

## 2023-05-07 PROCEDURE — 99900035 HC TECH TIME PER 15 MIN (STAT)

## 2023-05-07 PROCEDURE — 27000221 HC OXYGEN, UP TO 24 HOURS

## 2023-05-07 RX ORDER — FUROSEMIDE 10 MG/ML
20 INJECTION INTRAMUSCULAR; INTRAVENOUS ONCE
Status: COMPLETED | OUTPATIENT
Start: 2023-05-07 | End: 2023-05-07

## 2023-05-07 RX ADMIN — PIPERACILLIN SODIUM AND TAZOBACTAM SODIUM 4.5 G: 4; .5 INJECTION, POWDER, FOR SOLUTION INTRAVENOUS at 10:05

## 2023-05-07 RX ADMIN — FUROSEMIDE 20 MG: 10 INJECTION, SOLUTION INTRAMUSCULAR; INTRAVENOUS at 08:05

## 2023-05-07 RX ADMIN — IPRATROPIUM BROMIDE AND ALBUTEROL SULFATE 3 ML: .5; 3 SOLUTION RESPIRATORY (INHALATION) at 01:05

## 2023-05-07 RX ADMIN — DORZOLAMIDE 1 DROP: 20 SOLUTION/ DROPS OPHTHALMIC at 08:05

## 2023-05-07 RX ADMIN — DORZOLAMIDE 1 DROP: 20 SOLUTION/ DROPS OPHTHALMIC at 09:05

## 2023-05-07 RX ADMIN — IPRATROPIUM BROMIDE AND ALBUTEROL SULFATE 3 ML: .5; 3 SOLUTION RESPIRATORY (INHALATION) at 07:05

## 2023-05-07 RX ADMIN — ENOXAPARIN SODIUM 40 MG: 40 INJECTION SUBCUTANEOUS at 04:05

## 2023-05-07 RX ADMIN — IPRATROPIUM BROMIDE AND ALBUTEROL SULFATE 3 ML: .5; 3 SOLUTION RESPIRATORY (INHALATION) at 08:05

## 2023-05-07 RX ADMIN — LEVETIRACETAM 750 MG: 100 INJECTION, SOLUTION INTRAVENOUS at 08:05

## 2023-05-07 RX ADMIN — TIMOLOL MALEATE 1 DROP: 5 SOLUTION OPHTHALMIC at 08:05

## 2023-05-07 RX ADMIN — TIMOLOL MALEATE 1 DROP: 5 SOLUTION OPHTHALMIC at 09:05

## 2023-05-07 RX ADMIN — LATANOPROST 1 DROP: 50 SOLUTION OPHTHALMIC at 09:05

## 2023-05-07 RX ADMIN — PIPERACILLIN SODIUM AND TAZOBACTAM SODIUM 4.5 G: 4; .5 INJECTION, POWDER, FOR SOLUTION INTRAVENOUS at 03:05

## 2023-05-07 RX ADMIN — LEVETIRACETAM 750 MG: 100 INJECTION, SOLUTION INTRAVENOUS at 09:05

## 2023-05-07 RX ADMIN — PIPERACILLIN SODIUM AND TAZOBACTAM SODIUM 4.5 G: 4; .5 INJECTION, POWDER, FOR SOLUTION INTRAVENOUS at 06:05

## 2023-05-07 NOTE — PLAN OF CARE
Dx Sm Bowel Obs     Shift Events: None     Goals of Care: Monitor NG     Neuro: A&Ox4     Vital Signs: Stable      Respiratory: 1L NC     Diet: NPO + ice, tolerating one ice chip at a time     Is patient tolerating current diet? N/A     GTTS: none     Urine Output/Bowel Movement: Last Bowel Movement: 05/06/23     Drains/Tubes/Tube Feeds (include total output/shift): NG tube, LUQ Colostomy     Lines: 20# RFA     Accuchecks:Q6     Skin: see LDA     Fall Risk Score:  Fall Risk Assessment 5/7/2023 5/6/2023 5/6/2023 5/5/2023 5/4/2023 5/4/2023 5/3/2023   History Of Fall (W/I 3 Mos) 4-->Yes 4-->Yes 4-->Yes 0-->No 0-->No 0-->No 0-->No   Polypharmacy 0-->No 0-->No 0-->No 0-->No 0-->No 0-->No 0-->No   Central Nervous System/Psychotropic Medication 0-->No 0-->No 0-->No 3-->Yes 3-->Yes 0-->No 0-->No   Cardiovascular Medication 0-->No 0-->No 0-->No 0-->No 0-->No 0-->No 0-->No   Age Greater Than 65 Years 2-->Yes 2-->Yes 2-->Yes 2-->Yes 2-->Yes 2-->Yes 2-->Yes   Altered Elimination 2-->Yes 2-->Yes 2-->Yes 2-->Yes 2-->Yes 2-->Yes 2-->Yes   Cognitive Deficit 2-->Yes 2-->Yes 2-->Yes 2-->Yes 2-->Yes 2-->Yes 2-->Yes   Sensory Deficit 0-->No 0-->No 0-->No 2-->Yes 2-->Yes 2-->Yes 2-->Yes   Dizziness/Vertigo 0-->No 0-->No 0-->No 0-->No 0-->No 0-->No 0-->No   Depression 0-->No 0-->No 0-->No 0-->No 0-->No 0-->No 0-->No   Mobility Deficit/Weakness 2-->Yes 2-->Yes 2-->Yes 2-->Yes 2-->Yes 2-->Yes 2-->Yes   Male 1-->Yes 1-->Yes 1-->Yes 1-->Yes 1-->Yes 1-->Yes 1-->Yes   Fall Risk Score 13 13 13 14 14 11 11        Activity level? Bed bound    Possible IR Thoracentesis Monday

## 2023-05-07 NOTE — SUBJECTIVE & OBJECTIVE
Interval History:   NAEON. AF, 123-166 SBP. Unable to contact IR yesterday, will attempt again today re: bilateral pleural effusions. Okay for ice chips, still NPO. Lasix 20 IV given    Medications:  Continuous Infusions:  Scheduled Meds:   albuterol-ipratropium  3 mL Nebulization Q6H    dorzolamide  1 drop Both Eyes BID    enoxaparin  40 mg Subcutaneous Daily    furosemide (LASIX) injection  20 mg Intravenous Once    latanoprost  1 drop Both Eyes QHS    levetiracetam IV  750 mg Intravenous Q12H    piperacillin-tazobactam (ZOSYN) IVPB  4.5 g Intravenous Q8H    timolol maleate 0.5%  1 drop Both Eyes BID     PRN Meds:dextrose 10%, dextrose 10%, dextrose 10%, glucagon (human recombinant), HYDROmorphone, ibuprofen, insulin aspart U-100, iohexol, ondansetron, sodium chloride 0.9%     Review of patient's allergies indicates:   Allergen Reactions    Latex, natural rubber Anxiety     Objective:     Vital Signs (Most Recent):  Temp: 98.6 °F (37 °C) (05/07/23 0554)  Pulse: 74 (05/07/23 0720)  Resp: 18 (05/07/23 0720)  BP: (!) 149/76 (05/07/23 0554)  SpO2: (!) 94 % (05/07/23 0720) Vital Signs (24h Range):  Temp:  [98.4 °F (36.9 °C)-99.3 °F (37.4 °C)] 98.6 °F (37 °C)  Pulse:  [74-91] 74  Resp:  [18] 18  SpO2:  [92 %-96 %] 94 %  BP: (123-166)/(75-81) 149/76     Weight: 64.3 kg (141 lb 12.1 oz)  Body mass index is 21.55 kg/m².    Intake/Output - Last 3 Shifts         05/05 0700 05/06 0659 05/06 0700 05/07 0659 05/07 0700 05/08 0659    P.O. 0      Total Intake(mL/kg) 0 (0)      Urine (mL/kg/hr) 3350 (2.2) 1650 (1.1)     Drains 400 100     Other       Stool 100      Total Output 3850 1750     Net -3850 -1750            Stool Occurrence   1 x             Physical Exam   Constitutional:       General: He is not in acute distress.  HENT:      Nose:      Comments: NGT in place with output  Cardiovascular:      Rate and Rhythm: Normal rate and regular rhythm.   Pulmonary:      Effort: Pulmonary effort is normal. No respiratory  distress.      Breath sounds: Normal breath sounds.   Abdominal:      General: There is distension      Tenderness: Tenderness: none There is no guarding.      Comments: End colostomy with scant stool, mucosa pink and healthy   Skin:     General: Skin is warm and dry.   Neurological:      Mental Status: Mental status is at baseline  Significant Labs:  I have reviewed all pertinent lab results within the past 24 hours.    Significant Diagnostics:  I have reviewed all pertinent imaging results/findings within the past 24 hours.  No results found in the last 24 hours.
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
## 2023-05-07 NOTE — NURSING
Dx Sm Bowel Obs     Shift Events: none     Goals of Care: Accu cks Q 6h, Triade cream to sacrum BID, NPO + chips     Neuro: A O x 3 -4 asleep most of shift, but arousable     Vital Signs: Stable      Respiratory: 1L NC     Diet: NPO + ice, tolerating one ice chip at a time     Is patient tolerating current diet? N/A     GTTS: fluids discontinued     Urine Output/Bowel Movement: Aleman OP clear yellow, but foul smelling- adequate OP     Drains/Tubes/Tube Feeds (include total output/shift): NG tube, LUQ Colostomy     Lines: 20# RFA, infusing IV zosyn     Accuchecks: q6h     Skin: sacrum some excoriation     Fall Risk Score: Fall risk is bed bound     Activity level? Bed bound

## 2023-05-07 NOTE — CONSULTS
Yan Way is a 69 y/o male with history of T2DM, seizure disorder, and rectal cancer who is admitted for small bowel obstruction. CT 5/5/23 showed bilateral large pleural effusions. IR was consulted for thoracentesis.     Order is already placed for IR Thoracentesis. Thoracentesis will be scheduled in the Minor Procedures Unit. Please place orders for any studies to be collected.     Jayleen Lerma MD  Radiology PGY-2
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
## 2023-05-08 LAB
ANION GAP SERPL CALC-SCNC: 11 MMOL/L (ref 8–16)
BUN SERPL-MCNC: 10 MG/DL (ref 8–23)
CALCIUM SERPL-MCNC: 7.8 MG/DL (ref 8.7–10.5)
CHLORIDE SERPL-SCNC: 107 MMOL/L (ref 95–110)
CO2 SERPL-SCNC: 24 MMOL/L (ref 23–29)
CREAT SERPL-MCNC: 0.7 MG/DL (ref 0.5–1.4)
CRP SERPL-MCNC: 81.5 MG/L (ref 0–8.2)
ERYTHROCYTE [DISTWIDTH] IN BLOOD BY AUTOMATED COUNT: 15.6 % (ref 11.5–14.5)
EST. GFR  (NO RACE VARIABLE): >60 ML/MIN/1.73 M^2
GLUCOSE SERPL-MCNC: 147 MG/DL (ref 70–110)
HCT VFR BLD AUTO: 31.5 % (ref 40–54)
HGB BLD-MCNC: 9 G/DL (ref 14–18)
MCH RBC QN AUTO: 23.6 PG (ref 27–31)
MCHC RBC AUTO-ENTMCNC: 28.6 G/DL (ref 32–36)
MCV RBC AUTO: 83 FL (ref 82–98)
PLATELET # BLD AUTO: 413 K/UL (ref 150–450)
PMV BLD AUTO: 8.3 FL (ref 9.2–12.9)
POCT GLUCOSE: 137 MG/DL (ref 70–110)
POCT GLUCOSE: 142 MG/DL (ref 70–110)
POCT GLUCOSE: 229 MG/DL (ref 70–110)
POTASSIUM SERPL-SCNC: 3.3 MMOL/L (ref 3.5–5.1)
RBC # BLD AUTO: 3.82 M/UL (ref 4.6–6.2)
SODIUM SERPL-SCNC: 142 MMOL/L (ref 136–145)
WBC # BLD AUTO: 9.55 K/UL (ref 3.9–12.7)

## 2023-05-08 PROCEDURE — 94761 N-INVAS EAR/PLS OXIMETRY MLT: CPT

## 2023-05-08 PROCEDURE — 25000003 PHARM REV CODE 250: Performed by: STUDENT IN AN ORGANIZED HEALTH CARE EDUCATION/TRAINING PROGRAM

## 2023-05-08 PROCEDURE — 36415 COLL VENOUS BLD VENIPUNCTURE: CPT | Performed by: COLON & RECTAL SURGERY

## 2023-05-08 PROCEDURE — 63600175 PHARM REV CODE 636 W HCPCS: Performed by: STUDENT IN AN ORGANIZED HEALTH CARE EDUCATION/TRAINING PROGRAM

## 2023-05-08 PROCEDURE — 85027 COMPLETE CBC AUTOMATED: CPT | Performed by: COLON & RECTAL SURGERY

## 2023-05-08 PROCEDURE — 25000003 PHARM REV CODE 250: Performed by: FAMILY MEDICINE

## 2023-05-08 PROCEDURE — 63600175 PHARM REV CODE 636 W HCPCS

## 2023-05-08 PROCEDURE — 27000221 HC OXYGEN, UP TO 24 HOURS

## 2023-05-08 PROCEDURE — 99900031 HC PATIENT EDUCATION (STAT)

## 2023-05-08 PROCEDURE — 80048 BASIC METABOLIC PNL TOTAL CA: CPT | Performed by: COLON & RECTAL SURGERY

## 2023-05-08 PROCEDURE — 25000242 PHARM REV CODE 250 ALT 637 W/ HCPCS: Performed by: STUDENT IN AN ORGANIZED HEALTH CARE EDUCATION/TRAINING PROGRAM

## 2023-05-08 PROCEDURE — 99900035 HC TECH TIME PER 15 MIN (STAT)

## 2023-05-08 PROCEDURE — 20600001 HC STEP DOWN PRIVATE ROOM

## 2023-05-08 PROCEDURE — 94668 MNPJ CHEST WALL SBSQ: CPT

## 2023-05-08 PROCEDURE — 94640 AIRWAY INHALATION TREATMENT: CPT

## 2023-05-08 PROCEDURE — 86140 C-REACTIVE PROTEIN: CPT | Performed by: COLON & RECTAL SURGERY

## 2023-05-08 RX ORDER — TAMSULOSIN HYDROCHLORIDE 0.4 MG/1
0.4 CAPSULE ORAL DAILY
Status: DISCONTINUED | OUTPATIENT
Start: 2023-05-08 | End: 2023-05-11 | Stop reason: HOSPADM

## 2023-05-08 RX ORDER — POTASSIUM CHLORIDE 7.45 MG/ML
10 INJECTION INTRAVENOUS
Status: DISPENSED | OUTPATIENT
Start: 2023-05-08 | End: 2023-05-08

## 2023-05-08 RX ORDER — OXYCODONE HYDROCHLORIDE 5 MG/1
5 TABLET ORAL EVERY 4 HOURS PRN
Status: DISCONTINUED | OUTPATIENT
Start: 2023-05-08 | End: 2023-05-11 | Stop reason: HOSPADM

## 2023-05-08 RX ORDER — LIDOCAINE HYDROCHLORIDE 10 MG/ML
INJECTION INFILTRATION; PERINEURAL
Status: COMPLETED | OUTPATIENT
Start: 2023-05-08 | End: 2023-05-08

## 2023-05-08 RX ORDER — FUROSEMIDE 10 MG/ML
20 INJECTION INTRAMUSCULAR; INTRAVENOUS ONCE
Status: COMPLETED | OUTPATIENT
Start: 2023-05-08 | End: 2023-05-08

## 2023-05-08 RX ORDER — LEVETIRACETAM 750 MG/1
750 TABLET ORAL 2 TIMES DAILY
Status: DISCONTINUED | OUTPATIENT
Start: 2023-05-08 | End: 2023-05-11 | Stop reason: HOSPADM

## 2023-05-08 RX ORDER — ACETAMINOPHEN 325 MG/1
650 TABLET ORAL EVERY 6 HOURS PRN
Status: DISCONTINUED | OUTPATIENT
Start: 2023-05-08 | End: 2023-05-11 | Stop reason: HOSPADM

## 2023-05-08 RX ADMIN — FUROSEMIDE 20 MG: 10 INJECTION, SOLUTION INTRAMUSCULAR; INTRAVENOUS at 09:05

## 2023-05-08 RX ADMIN — LEVETIRACETAM 750 MG: 100 INJECTION, SOLUTION INTRAVENOUS at 08:05

## 2023-05-08 RX ADMIN — ENOXAPARIN SODIUM 40 MG: 40 INJECTION SUBCUTANEOUS at 05:05

## 2023-05-08 RX ADMIN — POTASSIUM CHLORIDE 10 MEQ: 10 INJECTION, SOLUTION INTRAVENOUS at 09:05

## 2023-05-08 RX ADMIN — IPRATROPIUM BROMIDE AND ALBUTEROL SULFATE 3 ML: .5; 3 SOLUTION RESPIRATORY (INHALATION) at 02:05

## 2023-05-08 RX ADMIN — IPRATROPIUM BROMIDE AND ALBUTEROL SULFATE 3 ML: .5; 3 SOLUTION RESPIRATORY (INHALATION) at 07:05

## 2023-05-08 RX ADMIN — POTASSIUM CHLORIDE 10 MEQ: 10 INJECTION, SOLUTION INTRAVENOUS at 02:05

## 2023-05-08 RX ADMIN — IPRATROPIUM BROMIDE AND ALBUTEROL SULFATE 3 ML: .5; 3 SOLUTION RESPIRATORY (INHALATION) at 12:05

## 2023-05-08 RX ADMIN — INSULIN ASPART 2 UNITS: 100 INJECTION, SOLUTION INTRAVENOUS; SUBCUTANEOUS at 05:05

## 2023-05-08 RX ADMIN — DORZOLAMIDE 1 DROP: 20 SOLUTION/ DROPS OPHTHALMIC at 09:05

## 2023-05-08 RX ADMIN — TIMOLOL MALEATE 1 DROP: 5 SOLUTION OPHTHALMIC at 09:05

## 2023-05-08 RX ADMIN — PIPERACILLIN SODIUM AND TAZOBACTAM SODIUM 4.5 G: 4; .5 INJECTION, POWDER, FOR SOLUTION INTRAVENOUS at 07:05

## 2023-05-08 RX ADMIN — DORZOLAMIDE 1 DROP: 20 SOLUTION/ DROPS OPHTHALMIC at 08:05

## 2023-05-08 RX ADMIN — TAMSULOSIN HYDROCHLORIDE 0.4 MG: 0.4 CAPSULE ORAL at 05:05

## 2023-05-08 RX ADMIN — LATANOPROST 1 DROP: 50 SOLUTION OPHTHALMIC at 09:05

## 2023-05-08 RX ADMIN — ACETAMINOPHEN 650 MG: 325 TABLET ORAL at 01:05

## 2023-05-08 RX ADMIN — PIPERACILLIN SODIUM AND TAZOBACTAM SODIUM 4.5 G: 4; .5 INJECTION, POWDER, FOR SOLUTION INTRAVENOUS at 01:05

## 2023-05-08 RX ADMIN — POTASSIUM CHLORIDE 10 MEQ: 10 INJECTION, SOLUTION INTRAVENOUS at 12:05

## 2023-05-08 RX ADMIN — PIPERACILLIN SODIUM AND TAZOBACTAM SODIUM 4.5 G: 4; .5 INJECTION, POWDER, FOR SOLUTION INTRAVENOUS at 03:05

## 2023-05-08 RX ADMIN — LIDOCAINE HYDROCHLORIDE 5 ML: 10 INJECTION, SOLUTION INFILTRATION; PERINEURAL at 11:05

## 2023-05-08 RX ADMIN — TIMOLOL MALEATE 1 DROP: 5 SOLUTION OPHTHALMIC at 08:05

## 2023-05-08 RX ADMIN — LEVETIRACETAM 750 MG: 750 TABLET, FILM COATED ORAL at 09:05

## 2023-05-08 NOTE — PLAN OF CARE
Patient tolerated thoracentesis well. VSS. 1100 ml pleural fluid drained. (No orders for labs on fluid.) Post-procedure chest xray reviewed by Abril THAKUR. Okay to return to room. Report called to Becky GARCIA.

## 2023-05-08 NOTE — PLAN OF CARE
Patient presents for US-guided RIGHT thoracentesis. Patient is awake and alert. See VS Flowsheet. Awaiting evaluation by provider and procedure consent.

## 2023-05-08 NOTE — H&P
Inpatient Radiology Pre-procedure Note    History of Present Illness:  Yan Way is a 68 y.o. male who presents for ultrasound guided thoracentesis.  Admission H&P reviewed.  Past Medical History:   Diagnosis Date    Diabetes     Hyperlipidemia     Rectal cancer 11/11/2022    pMMR    Seizure      Past Surgical History:   Procedure Laterality Date    BACK SURGERY      FLEXIBLE SIGMOIDOSCOPY N/A 4/26/2023    Procedure: SIGMOIDOSCOPY, FLEXIBLE;  Surgeon: BRISA Maurer MD;  Location: [facility] OR 2ND FLR;  Service: Colon and Rectal;  Laterality: N/A;    HIP REPLACEMENT ARTHROPLASTY      LAPAROSCOPIC PROCTECTOMY N/A 4/26/2023    Procedure: PROCTECTOMY, LAPAROSCOPIC, ERAS high;  Surgeon: BRISA Maurer MD;  Location: [facility] OR 2ND FLR;  Service: Colon and Rectal;  Laterality: N/A;    TIBIA FRACTURE SURGERY         Review of Systems:   As documented in primary team H&P    Home Meds:   Prior to Admission medications    Medication Sig Start Date End Date Taking? Authorizing Provider   acetaminophen (TYLENOL) 650 MG TbSR Take 1 tablet (650 mg total) by mouth every 8 (eight) hours. 4/29/23   Arabella Estevez MD   busPIRone (BUSPAR) 7.5 MG tablet Take 7.5 mg by mouth 2 (two) times a day. 1/3/23   Historical Provider   clobetasoL (CLOBEX) 0.05 % lotion Apply topically. 12/7/22   Historical Provider   dorzolamide-timolol 2-0.5% (COSOPT) 22.3-6.8 mg/mL ophthalmic solution Place into both eyes. 12/17/22   Historical Provider   fluticasone propionate (FLONASE) 50 mcg/actuation nasal spray by Each Nostril route. 11/22/22   Historical Provider   gabapentin (NEURONTIN) 300 MG capsule Take 1 capsule (300 mg total) by mouth 3 (three) times daily. for 14 days 4/29/23 5/13/23  Arablela Estevez MD   ibuprofen (ADVIL,MOTRIN) 800 MG tablet Take 1 tablet (800 mg total) by mouth every 8 (eight) hours as needed for Pain. 4/29/23   Arabella Estevez MD   insulin NPH-insulin regular, 70/30, 100 unit/mL (70-30) injection 36 Units in AM and 26  Units in PM    Historical Provider   latanoprost 0.005 % ophthalmic solution Apply 1 drop to eye every evening.    Historical Provider   levETIRAcetam (KEPPRA) 750 MG Tab Take 750 mg by mouth 2 (two) times daily.    Historical Provider   loratadine (CLARITIN) 10 mg tablet Take 10 mg by mouth.    Historical Provider   magnesium chloride (MAG 64) 64 mg TbEC Take 1 tablet by mouth once daily.    Historical Provider   metFORMIN (GLUCOPHAGE) 500 MG tablet Take 500 mg by mouth 2 (two) times daily with meals.    Historical Provider   nystatin (MYCOSTATIN) powder nystatin 100,000 unit/gram topical powder   APPLY TO THE AFFECTED AREA(S) BY TOPICAL ROUTE 2 TIMES PER DAY UNTIL HEALED    Historical Provider   oxyCODONE (ROXICODONE) 5 MG immediate release tablet Take 1 tablet (5 mg total) by mouth every 4 (four) hours as needed for Pain. 4/29/23   Arabella Estevez MD   phenylephrine-DM-guaiFENesin 5- mg/5 mL Liqd Take by mouth.    Historical Provider   polyethylene glycol (GLYCOLAX) 17 gram/dose powder Use cap to measure 17g, mix with liquid, and take by mouth daily as needed (Constipation). 4/29/23   Arabella Estevez MD   salicylic acid 2 % Crea 2 g.    Historical Provider   sertraline (ZOLOFT) 50 MG tablet Take 100 mg by mouth every evening.    Historical Provider   tamsulosin (FLOMAX) 0.4 mg Cap Take 0.4 mg by mouth. 3/29/22 4/25/23  Historical Provider     Scheduled Meds:    albuterol-ipratropium  3 mL Nebulization Q6H    dorzolamide  1 drop Both Eyes BID    enoxaparin  40 mg Subcutaneous Daily    latanoprost  1 drop Both Eyes QHS    levETIRAcetam  750 mg Oral BID    piperacillin-tazobactam (ZOSYN) IVPB  4.5 g Intravenous Q8H    potassium chloride  10 mEq Intravenous Q1H    tamsulosin  0.4 mg Oral Daily    timolol maleate 0.5%  1 drop Both Eyes BID     Continuous Infusions:   PRN Meds:acetaminophen, dextrose 10%, dextrose 10%, dextrose 10%, glucagon (human recombinant), insulin aspart U-100, iohexol, LIDOcaine HCL 10 mg/ml  (1%), ondansetron, oxyCODONE, sodium chloride 0.9%  Anticoagulants/Antiplatelets: Lovenox    Allergies:   Review of patient's allergies indicates:   Allergen Reactions    Latex, natural rubber Anxiety     Sedation Hx: have not been any systemic reactions    Labs:  Recent Labs   Lab 05/06/23  1037   INR 1.0       Recent Labs   Lab 05/08/23  0959   WBC 9.55   HGB 9.0*   HCT 31.5*   MCV 83         Recent Labs   Lab 05/05/23  1613 05/06/23  0321 05/08/23  0650   * 170* 147*    142 142   K 4.1 3.7 3.3*   * 106 107   CO2 24 25 24   BUN 8 7* 10   CREATININE 0.6 0.8 0.7   CALCIUM 8.1* 8.0* 7.8*   MG  --  2.4  --    ALT 9*  --   --    AST 9*  --   --    ALBUMIN 2.4*  --   --    BILITOT 0.4  --   --          Vitals:  Temp: 98.7 °F (37.1 °C) (05/08/23 0832)  Pulse: 72 (05/08/23 1050)  Resp: 16 (05/08/23 1050)  BP: (!) 178/84 (05/08/23 1050)  SpO2: 99 % (05/08/23 1050)     Physical Exam:  ASA: 3  Mallampati: n/a    General: no acute distress  Mental Status: alert and oriented to person, place and time  HEENT: normocephalic, atraumatic  Chest: unlabored breathing  Heart: regular heart rate  Abdomen: nondistended  Extremity: moves all extremities    Plan: ultrasound guided thoracentesis  Sedation Plan: local    KIEL Gonzalez, DEANAP  Interventional Radiology  [phone] [facility]

## 2023-05-08 NOTE — PROCEDURES
Radiology Post-Procedure Note    Pre Op Diagnosis: pleural effusion  Post Op Diagnosis: Same    Procedure: ultrasound guided thoracentesis    Procedure performed by: Abril Camacho NP     Written Informed Consent Obtained: Yes  Specimen Removed: YES serous  Estimated Blood Loss: Minimal    Findings:   Successful ultrasound guided thoracentesis on the right    Patient tolerated procedure well.    KIEL Gonzalez, FNP  Interventional Radiology  [phone] clinic

## 2023-05-08 NOTE — PLAN OF CARE
05/08/23 1659   Discharge Reassessment   Assessment Type Discharge Planning Reassessment   Did the patient's condition or plan change since previous assessment? No   Discharge Plan discussed with: Patient   Communicated ELIEL with patient/caregiver Yes   Discharge Plan A Return to nursing home   Discharge Plan B Return to Nursing Home   DME Needed Upon Discharge  none   Discharge Barriers Identified None     Planned return to [facility] (p) [phone]; (f) [phone]. No discharge needs identified at this time.     Hellen Leblanc LMSW

## 2023-05-08 NOTE — SUBJECTIVE & OBJECTIVE
Subjective:     Interval History: NAEON. Pt requesting ice chips. Brother not currently at bedside. NGT with 900cc output in last 24H    Post-Op Info:  * No surgery found *          Medications:  Continuous Infusions:  Scheduled Meds:   albuterol-ipratropium  3 mL Nebulization Q6H    dorzolamide  1 drop Both Eyes BID    enoxaparin  40 mg Subcutaneous Daily    latanoprost  1 drop Both Eyes QHS    levetiracetam IV  750 mg Intravenous Q12H    piperacillin-tazobactam (ZOSYN) IVPB  4.5 g Intravenous Q8H    timolol maleate 0.5%  1 drop Both Eyes BID     PRN Meds:   dextrose 10%    dextrose 10%    dextrose 10%    glucagon (human recombinant)    HYDROmorphone    ibuprofen    insulin aspart U-100    iohexol    ondansetron    sodium chloride 0.9%        Objective:     Vital Signs (Most Recent):  Temp: 98.6 °F (37 °C) (05/08/23 0414)  Pulse: 80 (05/08/23 0414)  Resp: 18 (05/08/23 0414)  BP: (!) 158/84 (05/08/23 0414)  SpO2: (!) 94 % (05/08/23 0414) Vital Signs (24h Range):  Temp:  [97.1 °F (36.2 °C)-98.8 °F (37.1 °C)] 98.6 °F (37 °C)  Pulse:  [72-85] 80  Resp:  [14-20] 18  SpO2:  [91 %-98 %] 94 %  BP: (148-171)/(69-84) 158/84     Intake/Output - Last 3 Shifts         05/06 0700  05/07 0659 05/07 0700  05/08 0659    I.V. (mL/kg)  125 (1.9)    IV Piggyback  754.9    Total Intake(mL/kg)  879.9 (13.7)    Urine (mL/kg/hr) 1650 (1.1) 1100 (0.7)    Drains 100 900    Total Output 1750 2000    Net -1750 -1120.1          Stool Occurrence  1 x            Physical Exam  Constitutional:       General: He is not in acute distress.  HENT:      Nose:      Comments: NGT in place with no output  Cardiovascular:      Rate and Rhythm: Normal rate and regular rhythm.   Pulmonary:      Effort: Pulmonary effort is normal. No respiratory distress.      Breath sounds: Normal breath sounds.   Abdominal:      General: There is distension, abdomen soft     Tenderness: Tenderness: none There is no guarding.      Comments: End colostomy with solid stool  noted, mucosa pink and healthy   Skin:     General: Skin is warm and dry.   Neurological:      Mental Status: Mental status is at baseline    Significant Labs:  BMP (Last 3 Results):   Recent Labs   Lab 05/04/23  0815 05/05/23  1455 05/05/23  1613 05/06/23  0321   GLU  --  176* 171* 170*   NA  --  141 142 142   K  --  4.2 4.1 3.7   CL  --  111* 111* 106   CO2  --  21* 24 25   BUN  --  9 8 7*   CREATININE  --  0.6 0.6 0.8   CALCIUM  --  8.0* 8.1* 8.0*   MG 1.4* 1.8  --  2.4     CBC (Last 3 Results):   Recent Labs   Lab 05/03/23  0544 05/05/23  1456 05/06/23  0321   WBC 8.25 13.86* 12.29   RBC 3.08* 3.44* 3.19*   HGB 7.4* 8.3* 7.7*   HCT 25.5* 28.7* 25.8*    343 336   MCV 83 83 81*   MCH 24.0* 24.1* 24.1*   MCHC 29.0* 28.9* 29.8*       Significant Diagnostics:  None
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
## 2023-05-08 NOTE — NURSING
1215. Pt returned from thoracentesis. He is alert & oriented. Flushed IV & started K+ 10 mEq 2nd bag immediately upon return. Was placed on heart monitor prior to first dose. Pt is Sinus tachy  on monitor currently. Pt was re-positioned on right side, with HOB elevated. Provided CL diet with set up. Pt is currently eating & doing well.
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
## 2023-05-08 NOTE — PLAN OF CARE
[facility]  Pediatric Daily Progress Note         Assessment and Plan:   Assessment:   4 day old term AGA male with Strep anginosus bacteremia and possible meningitis.      Plan:   # Strep anginosus bacteremia with possible meningitis  # Chorioamnionitis  Chorio protocol, GPC on blood culture from placenta confirmed as strep anginosus.  Reviewed with ID at [facility] who recommend continued ampicillin increased to meningitic dosing.  LP attempted X2 on 3/27 but unable to obtain CSF.  Treating for presumptive meningitis for 21 day course with PICC placement.  - Continue ampicillin q8h plan for 21 days of treatment       - Today is day 3 of 21       - Head Ultrasound tomorrow       - Will investigate ophthalmology visit if possible       - Will continue to discuss with Pediatric ID for possible alternative treatment regimen       - Plan for repeat hearing screening after antibiotics       - Weekly CBC, CMP, and CRP  - Follow up repeat cultures  - Anticipate PICC line within the next 1-2 days       - Will review with NICU NNP  - Q4h vitals    # Fixed Split S2 on Cardiac Exam  Likely patent foramen ovale, will evaluate with ECHO tomorrow.     # Hypoglycemia (resolved)  Asymptomatic hypoglycemia  - Follow clinically and treat per protocol     # Normal  Cares  -Normal  care  -Anticipatory guidance given  -Encourage exclusive breastfeeding  -Circumcision discussed with parents, including risks and benefits.  Parents do not wish to proceed  -Hep B, Vit K, and Erythromycin Eye ointment completed  -Passed CCHD  -Hearing screening when available             Interval History:   Date and time of birth: 2019  1:00 AM    Stable, no new events    Risk factors for developing severe hyperbilirubinemia:Documented infection in  course    Feeding: Breast feeding going well     I & O for past 24 hours  No data found.  Patient Vitals for the past 24 hrs:   Quality of Breastfeed Breastfeeding  [facility] Plan of Care Note  Dx SBO    Shift Events none    Goals of Care: injury free    Neuro: A&Ox4    Vital Signs: Stable    Respiratory: WNL    Diet: add NPO    Is patient tolerating current diet? yes    GTTS: Antibiotics    Urine Output/Bowel Movement: Aleman to Gravity    Drains/Tubes/Tube Feeds (include total output/shift): none    Lines: none      Accuchecks:N/A    Skin: intact and repositioned every two hrs.      Fall Risk Score: 13    Activity level? 1    Any scheduled procedures? Thoracentesis     Any safety concerns? Skin breakdown if not moved every two hrs.      Other: none    Occurrences   03/28/20 1130 Fair breastfeed --   03/29/20 0005 -- 1   03/29/20 0230 -- 1   03/29/20 0330 -- 1   03/29/20 0430 -- 1   03/29/20 0530 -- 1   03/29/20 0830 -- 1     Patient Vitals for the past 24 hrs:   Urine Occurrence Stool Occurrence Stool Color   03/28/20 1400 1 1 Meconium;Green   03/28/20 2100 1 1 --   03/28/20 2300 -- 1 --   03/29/20 0005 1 1 --   03/29/20 0530 1 1 --              Physical Exam:   Vital Signs:  Patient Vitals for the past 24 hrs:   BP Temp Temp src Heart Rate Resp SpO2 Weight   03/29/20 0830 73/41 98.4  F (36.9  C) Axillary 104 32 97 % --   03/29/20 0355 -- 98.2  F (36.8  C) Axillary 128 36 99 % --   03/28/20 2342 77/49 98.2  F (36.8  C) Axillary 146 38 99 % --   03/28/20 2100 -- 98.8  F (37.1  C) Axillary 150 35 100 % --   03/28/20 1810 89/53 98.4  F (36.9  C) Axillary 146 40 100 % 2.699 kg (5 lb 15.2 oz)   03/28/20 1300 -- 98.8  F (37.1  C) Axillary 126 30 99 % --     Wt Readings from Last 3 Encounters:   03/28/20 2.699 kg (5 lb 15.2 oz) (5 %)*     * Growth percentiles are based on WHO (Boys, 0-2 years) data.       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  mild jaundice improving  Head/Neck:  normal anterior and posterior fontanelle, intact scalp, mild overriding sutures improving; Neck without masses  Ears/Nose/Mouth:  intact canals, patent nares, mouth normal  Thorax:  normal contour, clavicles intact  Lungs:  clear, no retractions, no increased work of breathing  Heart:  normal rate, rhythm.  No murmurs. Fixed split S2 noted.  Normal femoral pulses.  Abdomen:  soft without mass, tenderness, organomegaly, hernia.  Umbilicus normal.  Genitalia:  normal male external genitalia with testes descended bilaterally  Anus:  patent  Trunk/spine:  straight, intact, bandage over LP site c/d/i  Muskuloskeletal:  Normal Escobar and Ortolani maneuvers.   intact without deformity.  Normal digits.  Neurologic:  normal, symmetric tone and strength.  normal reflexes.         Data:     Recent Labs   Lab Test 03/28/20  1457 03/27/20  0938 03/26/20  0245   BILITOTAL 10.2 9.8 5.9   DBIL 0.2  --  0.2        bilitool     Karlie Conroy MD

## 2023-05-08 NOTE — PROGRESS NOTES
[facility] Skin Integrity Evaluation    Subjective    Skin Integrity KRISTIAN evaluation of patient as part of the comprehensive skin care team.     Yan Way is being evaluated as per the Epic skin report. He has been admitted for 6 days. Skin injury was noted on 05/03/2023.       Limited Physical exam     Lesion 1: R coccyx              Assessment :     Lesion 1:  Stage 2 Pressure Ulcer    POA : yes    Consults: Wound Care       Follow up:  - Use pillows or foam wedges to keep bony prominences from direct contact with each other. Keep pillows under the heels to raise off the bed.  - ordered waffle mattress overlay  - apply mepilex sacral dressing for noted erythema at bony prominence   - encourage adequate nutrition and hydration   - turn patient Q2H, encourage patient to change positions frequently as able    Patient will be followed up by the Skin Integrity KRISTIAN weekly, or more frequently as warranted.      Sybil Jolley, DNP, AG-ACNP, BC  Department of Hospital Medicine  [facility]

## 2023-05-09 LAB
ALBUMIN SERPL BCP-MCNC: 2 G/DL (ref 3.5–5.2)
ALP SERPL-CCNC: 52 U/L (ref 55–135)
ALT SERPL W/O P-5'-P-CCNC: 7 U/L (ref 10–44)
ANION GAP SERPL CALC-SCNC: 8 MMOL/L (ref 8–16)
AST SERPL-CCNC: 9 U/L (ref 10–40)
BASOPHILS # BLD AUTO: 0.03 K/UL (ref 0–0.2)
BASOPHILS NFR BLD: 0.3 % (ref 0–1.9)
BILIRUB SERPL-MCNC: 0.4 MG/DL (ref 0.1–1)
BUN SERPL-MCNC: 11 MG/DL (ref 8–23)
CALCIUM SERPL-MCNC: 8 MG/DL (ref 8.7–10.5)
CHLORIDE SERPL-SCNC: 102 MMOL/L (ref 95–110)
CO2 SERPL-SCNC: 27 MMOL/L (ref 23–29)
CREAT SERPL-MCNC: 0.6 MG/DL (ref 0.5–1.4)
CRP SERPL-MCNC: 64.4 MG/L (ref 0–8.2)
DIFFERENTIAL METHOD: ABNORMAL
EOSINOPHIL # BLD AUTO: 0.4 K/UL (ref 0–0.5)
EOSINOPHIL NFR BLD: 4.5 % (ref 0–8)
ERYTHROCYTE [DISTWIDTH] IN BLOOD BY AUTOMATED COUNT: 15.5 % (ref 11.5–14.5)
EST. GFR  (NO RACE VARIABLE): >60 ML/MIN/1.73 M^2
GLUCOSE SERPL-MCNC: 185 MG/DL (ref 70–110)
HCT VFR BLD AUTO: 28.6 % (ref 40–54)
HGB BLD-MCNC: 9 G/DL (ref 14–18)
IMM GRANULOCYTES # BLD AUTO: 0.05 K/UL (ref 0–0.04)
IMM GRANULOCYTES NFR BLD AUTO: 0.6 % (ref 0–0.5)
LYMPHOCYTES # BLD AUTO: 0.7 K/UL (ref 1–4.8)
LYMPHOCYTES NFR BLD: 8.4 % (ref 18–48)
MAGNESIUM SERPL-MCNC: 1.4 MG/DL (ref 1.6–2.6)
MCH RBC QN AUTO: 24.9 PG (ref 27–31)
MCHC RBC AUTO-ENTMCNC: 31.5 G/DL (ref 32–36)
MCV RBC AUTO: 79 FL (ref 82–98)
MONOCYTES # BLD AUTO: 0.6 K/UL (ref 0.3–1)
MONOCYTES NFR BLD: 7.1 % (ref 4–15)
NEUTROPHILS # BLD AUTO: 7 K/UL (ref 1.8–7.7)
NEUTROPHILS NFR BLD: 79.1 % (ref 38–73)
NRBC BLD-RTO: 0 /100 WBC
PHOSPHATE SERPL-MCNC: 2.5 MG/DL (ref 2.7–4.5)
PLATELET # BLD AUTO: 357 K/UL (ref 150–450)
PMV BLD AUTO: 8.4 FL (ref 9.2–12.9)
POCT GLUCOSE: 164 MG/DL (ref 70–110)
POCT GLUCOSE: 165 MG/DL (ref 70–110)
POCT GLUCOSE: 191 MG/DL (ref 70–110)
POCT GLUCOSE: 199 MG/DL (ref 70–110)
POCT GLUCOSE: 231 MG/DL (ref 70–110)
POCT GLUCOSE: 250 MG/DL (ref 70–110)
POCT GLUCOSE: 287 MG/DL (ref 70–110)
POTASSIUM SERPL-SCNC: 3.4 MMOL/L (ref 3.5–5.1)
PROT SERPL-MCNC: 5.4 G/DL (ref 6–8.4)
RBC # BLD AUTO: 3.61 M/UL (ref 4.6–6.2)
SODIUM SERPL-SCNC: 137 MMOL/L (ref 136–145)
WBC # BLD AUTO: 8.82 K/UL (ref 3.9–12.7)

## 2023-05-09 PROCEDURE — 25000242 PHARM REV CODE 250 ALT 637 W/ HCPCS: Performed by: STUDENT IN AN ORGANIZED HEALTH CARE EDUCATION/TRAINING PROGRAM

## 2023-05-09 PROCEDURE — 86140 C-REACTIVE PROTEIN: CPT

## 2023-05-09 PROCEDURE — 94640 AIRWAY INHALATION TREATMENT: CPT

## 2023-05-09 PROCEDURE — 94664 DEMO&/EVAL PT USE INHALER: CPT

## 2023-05-09 PROCEDURE — 63600175 PHARM REV CODE 636 W HCPCS: Performed by: STUDENT IN AN ORGANIZED HEALTH CARE EDUCATION/TRAINING PROGRAM

## 2023-05-09 PROCEDURE — 51798 US URINE CAPACITY MEASURE: CPT

## 2023-05-09 PROCEDURE — 84100 ASSAY OF PHOSPHORUS: CPT | Performed by: STUDENT IN AN ORGANIZED HEALTH CARE EDUCATION/TRAINING PROGRAM

## 2023-05-09 PROCEDURE — 25000003 PHARM REV CODE 250: Performed by: STUDENT IN AN ORGANIZED HEALTH CARE EDUCATION/TRAINING PROGRAM

## 2023-05-09 PROCEDURE — 27000221 HC OXYGEN, UP TO 24 HOURS

## 2023-05-09 PROCEDURE — 85025 COMPLETE CBC W/AUTO DIFF WBC: CPT | Performed by: STUDENT IN AN ORGANIZED HEALTH CARE EDUCATION/TRAINING PROGRAM

## 2023-05-09 PROCEDURE — 99900031 HC PATIENT EDUCATION (STAT)

## 2023-05-09 PROCEDURE — 36415 COLL VENOUS BLD VENIPUNCTURE: CPT | Performed by: STUDENT IN AN ORGANIZED HEALTH CARE EDUCATION/TRAINING PROGRAM

## 2023-05-09 PROCEDURE — 99900035 HC TECH TIME PER 15 MIN (STAT)

## 2023-05-09 PROCEDURE — 94668 MNPJ CHEST WALL SBSQ: CPT

## 2023-05-09 PROCEDURE — 20600001 HC STEP DOWN PRIVATE ROOM

## 2023-05-09 PROCEDURE — 80053 COMPREHEN METABOLIC PANEL: CPT | Performed by: STUDENT IN AN ORGANIZED HEALTH CARE EDUCATION/TRAINING PROGRAM

## 2023-05-09 PROCEDURE — 94761 N-INVAS EAR/PLS OXIMETRY MLT: CPT

## 2023-05-09 PROCEDURE — 25000003 PHARM REV CODE 250

## 2023-05-09 PROCEDURE — 63600175 PHARM REV CODE 636 W HCPCS

## 2023-05-09 PROCEDURE — 83735 ASSAY OF MAGNESIUM: CPT | Performed by: STUDENT IN AN ORGANIZED HEALTH CARE EDUCATION/TRAINING PROGRAM

## 2023-05-09 RX ORDER — MAGNESIUM SULFATE HEPTAHYDRATE 40 MG/ML
2 INJECTION, SOLUTION INTRAVENOUS
Status: COMPLETED | OUTPATIENT
Start: 2023-05-09 | End: 2023-05-09

## 2023-05-09 RX ORDER — POLYETHYLENE GLYCOL 3350 17 G/17G
17 POWDER, FOR SOLUTION ORAL DAILY
Status: CANCELLED | OUTPATIENT
Start: 2023-05-09

## 2023-05-09 RX ORDER — POLYETHYLENE GLYCOL 3350 17 G/17G
17 POWDER, FOR SOLUTION ORAL DAILY
Status: DISCONTINUED | OUTPATIENT
Start: 2023-05-09 | End: 2023-05-11 | Stop reason: HOSPADM

## 2023-05-09 RX ADMIN — POTASSIUM BICARBONATE 60 MEQ: 391 TABLET, EFFERVESCENT ORAL at 09:05

## 2023-05-09 RX ADMIN — PIPERACILLIN SODIUM AND TAZOBACTAM SODIUM 4.5 G: 4; .5 INJECTION, POWDER, FOR SOLUTION INTRAVENOUS at 11:05

## 2023-05-09 RX ADMIN — LEVETIRACETAM 750 MG: 750 TABLET, FILM COATED ORAL at 09:05

## 2023-05-09 RX ADMIN — SODIUM PHOSPHATE, MONOBASIC, MONOHYDRATE AND SODIUM PHOSPHATE, DIBASIC, ANHYDROUS 30 MMOL: 276; 142 INJECTION, SOLUTION INTRAVENOUS at 04:05

## 2023-05-09 RX ADMIN — IPRATROPIUM BROMIDE AND ALBUTEROL SULFATE 3 ML: .5; 3 SOLUTION RESPIRATORY (INHALATION) at 12:05

## 2023-05-09 RX ADMIN — MAGNESIUM SULFATE 2 G: 2 INJECTION INTRAVENOUS at 06:05

## 2023-05-09 RX ADMIN — ENOXAPARIN SODIUM 40 MG: 40 INJECTION SUBCUTANEOUS at 04:05

## 2023-05-09 RX ADMIN — LATANOPROST 1 DROP: 50 SOLUTION OPHTHALMIC at 09:05

## 2023-05-09 RX ADMIN — IPRATROPIUM BROMIDE AND ALBUTEROL SULFATE 3 ML: .5; 3 SOLUTION RESPIRATORY (INHALATION) at 08:05

## 2023-05-09 RX ADMIN — MAGNESIUM SULFATE 2 G: 2 INJECTION INTRAVENOUS at 04:05

## 2023-05-09 RX ADMIN — TAMSULOSIN HYDROCHLORIDE 0.4 MG: 0.4 CAPSULE ORAL at 09:05

## 2023-05-09 RX ADMIN — INSULIN ASPART 2 UNITS: 100 INJECTION, SOLUTION INTRAVENOUS; SUBCUTANEOUS at 05:05

## 2023-05-09 RX ADMIN — DORZOLAMIDE 1 DROP: 20 SOLUTION/ DROPS OPHTHALMIC at 09:05

## 2023-05-09 RX ADMIN — TIMOLOL MALEATE 1 DROP: 5 SOLUTION OPHTHALMIC at 09:05

## 2023-05-09 RX ADMIN — PIPERACILLIN SODIUM AND TAZOBACTAM SODIUM 4.5 G: 4; .5 INJECTION, POWDER, FOR SOLUTION INTRAVENOUS at 06:05

## 2023-05-09 RX ADMIN — INSULIN ASPART 2 UNITS: 100 INJECTION, SOLUTION INTRAVENOUS; SUBCUTANEOUS at 01:05

## 2023-05-09 RX ADMIN — ACETAMINOPHEN 650 MG: 325 TABLET ORAL at 04:05

## 2023-05-09 RX ADMIN — POLYETHYLENE GLYCOL 3350 17 G: 17 POWDER, FOR SOLUTION ORAL at 04:05

## 2023-05-09 RX ADMIN — OXYCODONE HYDROCHLORIDE 5 MG: 5 TABLET ORAL at 06:05

## 2023-05-09 RX ADMIN — PIPERACILLIN SODIUM AND TAZOBACTAM SODIUM 4.5 G: 4; .5 INJECTION, POWDER, FOR SOLUTION INTRAVENOUS at 03:05
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
## 2023-05-09 NOTE — SUBJECTIVE & OBJECTIVE
Subjective:     Interval History: NAEON. Ostomy with stool and flatus output. Abdomen improved.    Post-Op Info:  * No surgery found *          Medications:  Continuous Infusions:  Scheduled Meds:   albuterol-ipratropium  3 mL Nebulization Q6H    dorzolamide  1 drop Both Eyes BID    enoxaparin  40 mg Subcutaneous Daily    latanoprost  1 drop Both Eyes QHS    levETIRAcetam  750 mg Oral BID    piperacillin-tazobactam (ZOSYN) IVPB  4.5 g Intravenous Q8H    potassium bicarbonate  60 mEq Oral Once    tamsulosin  0.4 mg Oral Daily    timolol maleate 0.5%  1 drop Both Eyes BID     PRN Meds:   acetaminophen    dextrose 10%    dextrose 10%    dextrose 10%    glucagon (human recombinant)    insulin aspart U-100    iohexol    ondansetron    oxyCODONE    sodium chloride 0.9%        Objective:     Vital Signs (Most Recent):  Temp: 98.6 °F (37 °C) (05/09/23 0540)  Pulse: 77 (05/09/23 0540)  Resp: 18 (05/09/23 0540)  BP: 127/68 (05/09/23 0540)  SpO2: 100 % (05/09/23 0540) Vital Signs (24h Range):  Temp:  [97.8 °F (36.6 °C)-100 °F (37.8 °C)] 98.6 °F (37 °C)  Pulse:  [68-96] 77  Resp:  [16-20] 18  SpO2:  [94 %-100 %] 100 %  BP: (126-178)/(66-84) 127/68     Intake/Output - Last 3 Shifts         05/07 0700  05/08 0659 05/08 0700  05/09 0659 05/09 0700  05/10 0659    I.V. (mL/kg) 125 (1.9)      IV Piggyback 754.9      Total Intake(mL/kg) 879.9 (13.7)      Urine (mL/kg/hr) 1100 (0.7)      Drains 900      Other  1100     Total Output 2000 1100     Net -1120.1 -1100            Stool Occurrence 1 x             Physical Exam  Constitutional:       General: He is not in acute distress.  Cardiovascular:      Rate and Rhythm: Normal rate and regular rhythm.   Pulmonary:      Effort: Pulmonary effort is normal. No respiratory distress.      Breath sounds: Normal breath sounds.   Abdominal:      General: There is minimal to no distension, abdomen soft     Tenderness: Tenderness: none There is no guarding.      Comments: End colostomy with  solid stool noted, mucosa pink and healthy   Skin:     General: Skin is warm and dry.   Neurological:      Mental Status: Mental status is at baseline    Significant Labs:  BMP (Last 3 Results):   Recent Labs   Lab 05/05/23  1455 05/05/23  1613 05/06/23  0321 05/08/23  0650 05/09/23  0513   *   < > 170* 147* 185*      < > 142 142 137   K 4.2   < > 3.7 3.3* 3.4*   *   < > 106 107 102   CO2 21*   < > 25 24 27   BUN 9   < > 7* 10 11   CREATININE 0.6   < > 0.8 0.7 0.6   CALCIUM 8.0*   < > 8.0* 7.8* 8.0*   MG 1.8  --  2.4  --  1.4*    < > = values in this interval not displayed.     CBC (Last 3 Results):   Recent Labs   Lab 05/06/23  0321 05/08/23  0959 05/09/23  0514   WBC 12.29 9.55 8.82   RBC 3.19* 3.82* 3.61*   HGB 7.7* 9.0* 9.0*   HCT 25.8* 31.5* 28.6*    413 357   MCV 81* 83 79*   MCH 24.1* 23.6* 24.9*   MCHC 29.8* 28.6* 31.5*       Significant Diagnostics:  None

## 2023-05-09 NOTE — PLAN OF CARE
Recommendations     1.) Recommend continuing with Low Fiber/Low Residue diet as tolerated.      2.) Recommend to d/c Boost Glucose Control d/t supplier issues.      3.) Recommend Boost Plus BID, for breakfast and dinner to help meet EEN/EPN.      4.) RD to monitor wt, PO intake, skin, labs.        Goals: to meet % of EEN/EPN by next RD f/u  Nutrition Goal Status: progressing towards goal  Communication of RD Recs:  (POC)
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
## 2023-05-09 NOTE — PLAN OF CARE
[facility] Plan of Care Note  Dx SBO    Shift Events diet changed to low fiber, electrolytes ( potassium-Magnesium-phosphorus replaced), hard form stool from ostomy bag- Miralax added-stool now liquid loose, complained of aching pain on right leg-relieved with Oxycodone    Goals of Care: having BM , tolerate diet    Neuro: AAOx4-slow speech    Vital Signs: Stable    Respiratory: on 1L of O2    Diet: dysphagia soft+ boost glucose control    Is patient tolerating current diet? Yes-consumed 100% of his meals     GTTS: None    Urine Output/Bowel Movement: 325cc of urine, 300cc from colostomy bag    Drains/Tubes/Tube Feeds (include total output/shift): NA    Lines: 20g RFA, 20g LAC      Accuchecks:q6h    Skin: stage 2 pressure injury on sacrum     Fall Risk Score: 12    Activity level? Bed bound    Any scheduled procedures? none    Any safety concerns? Aspiration precaution, fall risk precaution     Other: nurse asked MD to change pt to diabetic diet with BG check ac/hs; pt now on dysphagia soft diet with q6h accu check- BG trending up, last one was 250. Tray set up, pt can feed himself, pt consumed 100% of his meals. Triad and foam dressing applied on sacrum, pt on waffle mattress, wearing heel boots. Tried to wean pt to RA but SpO2 at 87% on RA
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
## 2023-05-09 NOTE — PLAN OF CARE
05/09/23 1356   Post-Acute Status   Post-Acute Authorization Placement   Post-Acute Placement Status Pending medical clearance/testing   Discharge Delays None known at this time   Discharge Plan   Discharge Plan A Return to nursing home     CRS team informed SW pt may be ready to return to his NH tomorrow. CHRISTIANO sent clinical updates to the [facility]. CHRISTIANO provided update to pt and pts brother Truong.    CHRISTIANO left message for Kacey in admissions at [facility].    Marycruz Fine, DWIGHTW  PRN

## 2023-05-09 NOTE — NURSING
Dr. Estevez notified of diminished urine production and bladder scan of 128 ml. Advised to recheck bladder scan in 6 hours and straight cath if needed.

## 2023-05-10 LAB
ALBUMIN SERPL BCP-MCNC: 2.1 G/DL (ref 3.5–5.2)
ALP SERPL-CCNC: 58 U/L (ref 55–135)
ALT SERPL W/O P-5'-P-CCNC: 5 U/L (ref 10–44)
ANION GAP SERPL CALC-SCNC: 9 MMOL/L (ref 8–16)
AST SERPL-CCNC: 10 U/L (ref 10–40)
BASOPHILS # BLD AUTO: 0.03 K/UL (ref 0–0.2)
BASOPHILS NFR BLD: 0.4 % (ref 0–1.9)
BILIRUB SERPL-MCNC: 0.2 MG/DL (ref 0.1–1)
BUN SERPL-MCNC: 14 MG/DL (ref 8–23)
CALCIUM SERPL-MCNC: 8.1 MG/DL (ref 8.7–10.5)
CHLORIDE SERPL-SCNC: 99 MMOL/L (ref 95–110)
CO2 SERPL-SCNC: 28 MMOL/L (ref 23–29)
CREAT SERPL-MCNC: 0.8 MG/DL (ref 0.5–1.4)
CRP SERPL-MCNC: 49.2 MG/L (ref 0–8.2)
DIFFERENTIAL METHOD: ABNORMAL
EOSINOPHIL # BLD AUTO: 0.4 K/UL (ref 0–0.5)
EOSINOPHIL NFR BLD: 5.1 % (ref 0–8)
ERYTHROCYTE [DISTWIDTH] IN BLOOD BY AUTOMATED COUNT: 15.6 % (ref 11.5–14.5)
EST. GFR  (NO RACE VARIABLE): >60 ML/MIN/1.73 M^2
GLUCOSE SERPL-MCNC: 271 MG/DL (ref 70–110)
HCT VFR BLD AUTO: 30.4 % (ref 40–54)
HGB BLD-MCNC: 8.6 G/DL (ref 14–18)
IMM GRANULOCYTES # BLD AUTO: 0.03 K/UL (ref 0–0.04)
IMM GRANULOCYTES NFR BLD AUTO: 0.4 % (ref 0–0.5)
LYMPHOCYTES # BLD AUTO: 0.8 K/UL (ref 1–4.8)
LYMPHOCYTES NFR BLD: 10.5 % (ref 18–48)
MAGNESIUM SERPL-MCNC: 2.1 MG/DL (ref 1.6–2.6)
MCH RBC QN AUTO: 23.6 PG (ref 27–31)
MCHC RBC AUTO-ENTMCNC: 28.3 G/DL (ref 32–36)
MCV RBC AUTO: 84 FL (ref 82–98)
MONOCYTES # BLD AUTO: 0.5 K/UL (ref 0.3–1)
MONOCYTES NFR BLD: 6.2 % (ref 4–15)
NEUTROPHILS # BLD AUTO: 5.9 K/UL (ref 1.8–7.7)
NEUTROPHILS NFR BLD: 77.4 % (ref 38–73)
NRBC BLD-RTO: 0 /100 WBC
PHOSPHATE SERPL-MCNC: 3.3 MG/DL (ref 2.7–4.5)
PLATELET # BLD AUTO: 373 K/UL (ref 150–450)
PMV BLD AUTO: 8.3 FL (ref 9.2–12.9)
POCT GLUCOSE: 265 MG/DL (ref 70–110)
POCT GLUCOSE: 293 MG/DL (ref 70–110)
POCT GLUCOSE: 314 MG/DL (ref 70–110)
POCT GLUCOSE: 317 MG/DL (ref 70–110)
POCT GLUCOSE: 329 MG/DL (ref 70–110)
POCT GLUCOSE: 343 MG/DL (ref 70–110)
POCT GLUCOSE: 390 MG/DL (ref 70–110)
POTASSIUM SERPL-SCNC: 3.6 MMOL/L (ref 3.5–5.1)
PROT SERPL-MCNC: 5.5 G/DL (ref 6–8.4)
RBC # BLD AUTO: 3.64 M/UL (ref 4.6–6.2)
SODIUM SERPL-SCNC: 136 MMOL/L (ref 136–145)
WBC # BLD AUTO: 7.6 K/UL (ref 3.9–12.7)

## 2023-05-10 PROCEDURE — 94668 MNPJ CHEST WALL SBSQ: CPT

## 2023-05-10 PROCEDURE — 83735 ASSAY OF MAGNESIUM: CPT | Performed by: STUDENT IN AN ORGANIZED HEALTH CARE EDUCATION/TRAINING PROGRAM

## 2023-05-10 PROCEDURE — 25000242 PHARM REV CODE 250 ALT 637 W/ HCPCS: Performed by: STUDENT IN AN ORGANIZED HEALTH CARE EDUCATION/TRAINING PROGRAM

## 2023-05-10 PROCEDURE — 25000003 PHARM REV CODE 250: Performed by: STUDENT IN AN ORGANIZED HEALTH CARE EDUCATION/TRAINING PROGRAM

## 2023-05-10 PROCEDURE — 80053 COMPREHEN METABOLIC PANEL: CPT | Performed by: STUDENT IN AN ORGANIZED HEALTH CARE EDUCATION/TRAINING PROGRAM

## 2023-05-10 PROCEDURE — 20600001 HC STEP DOWN PRIVATE ROOM

## 2023-05-10 PROCEDURE — 99900035 HC TECH TIME PER 15 MIN (STAT)

## 2023-05-10 PROCEDURE — 63600175 PHARM REV CODE 636 W HCPCS: Performed by: STUDENT IN AN ORGANIZED HEALTH CARE EDUCATION/TRAINING PROGRAM

## 2023-05-10 PROCEDURE — 84100 ASSAY OF PHOSPHORUS: CPT | Performed by: STUDENT IN AN ORGANIZED HEALTH CARE EDUCATION/TRAINING PROGRAM

## 2023-05-10 PROCEDURE — 27000221 HC OXYGEN, UP TO 24 HOURS

## 2023-05-10 PROCEDURE — 63600175 PHARM REV CODE 636 W HCPCS

## 2023-05-10 PROCEDURE — 36415 COLL VENOUS BLD VENIPUNCTURE: CPT | Performed by: STUDENT IN AN ORGANIZED HEALTH CARE EDUCATION/TRAINING PROGRAM

## 2023-05-10 PROCEDURE — 85025 COMPLETE CBC W/AUTO DIFF WBC: CPT | Performed by: STUDENT IN AN ORGANIZED HEALTH CARE EDUCATION/TRAINING PROGRAM

## 2023-05-10 PROCEDURE — 94640 AIRWAY INHALATION TREATMENT: CPT

## 2023-05-10 PROCEDURE — 86140 C-REACTIVE PROTEIN: CPT

## 2023-05-10 PROCEDURE — 94761 N-INVAS EAR/PLS OXIMETRY MLT: CPT

## 2023-05-10 RX ORDER — POLYETHYLENE GLYCOL 3350 17 G/17G
17 POWDER, FOR SOLUTION ORAL DAILY
Qty: 510 G | Refills: 5 | Status: SHIPPED | OUTPATIENT
Start: 2023-05-10 | End: 2023-06-09

## 2023-05-10 RX ORDER — OXYCODONE HYDROCHLORIDE 5 MG/1
5 TABLET ORAL EVERY 6 HOURS PRN
Qty: 16 TABLET | Refills: 0 | Status: SHIPPED | OUTPATIENT
Start: 2023-05-10 | End: 2023-05-10 | Stop reason: HOSPADM

## 2023-05-10 RX ORDER — OXYCODONE HYDROCHLORIDE 5 MG/1
5 TABLET ORAL EVERY 6 HOURS PRN
Qty: 15 TABLET | Refills: 0 | Status: SHIPPED | OUTPATIENT
Start: 2023-05-10

## 2023-05-10 RX ORDER — ACETAMINOPHEN 325 MG/1
650 TABLET ORAL EVERY 6 HOURS PRN
Qty: 12 TABLET | Refills: 0 | Status: SHIPPED | OUTPATIENT
Start: 2023-05-10

## 2023-05-10 RX ADMIN — INSULIN ASPART 4 UNITS: 100 INJECTION, SOLUTION INTRAVENOUS; SUBCUTANEOUS at 01:05

## 2023-05-10 RX ADMIN — PIPERACILLIN SODIUM AND TAZOBACTAM SODIUM 4.5 G: 4; .5 INJECTION, POWDER, FOR SOLUTION INTRAVENOUS at 03:05

## 2023-05-10 RX ADMIN — LEVETIRACETAM 750 MG: 750 TABLET, FILM COATED ORAL at 10:05

## 2023-05-10 RX ADMIN — IPRATROPIUM BROMIDE AND ALBUTEROL SULFATE 3 ML: .5; 3 SOLUTION RESPIRATORY (INHALATION) at 07:05

## 2023-05-10 RX ADMIN — DORZOLAMIDE 1 DROP: 20 SOLUTION/ DROPS OPHTHALMIC at 09:05

## 2023-05-10 RX ADMIN — INSULIN ASPART 4 UNITS: 100 INJECTION, SOLUTION INTRAVENOUS; SUBCUTANEOUS at 05:05

## 2023-05-10 RX ADMIN — TAMSULOSIN HYDROCHLORIDE 0.4 MG: 0.4 CAPSULE ORAL at 10:05

## 2023-05-10 RX ADMIN — INSULIN ASPART 5 UNITS: 100 INJECTION, SOLUTION INTRAVENOUS; SUBCUTANEOUS at 10:05

## 2023-05-10 RX ADMIN — TIMOLOL MALEATE 1 DROP: 5 SOLUTION OPHTHALMIC at 10:05

## 2023-05-10 RX ADMIN — ENOXAPARIN SODIUM 40 MG: 40 INJECTION SUBCUTANEOUS at 05:05

## 2023-05-10 RX ADMIN — LEVETIRACETAM 750 MG: 750 TABLET, FILM COATED ORAL at 09:05

## 2023-05-10 RX ADMIN — DORZOLAMIDE 1 DROP: 20 SOLUTION/ DROPS OPHTHALMIC at 10:05

## 2023-05-10 RX ADMIN — TIMOLOL MALEATE 1 DROP: 5 SOLUTION OPHTHALMIC at 09:05

## 2023-05-10 RX ADMIN — IPRATROPIUM BROMIDE AND ALBUTEROL SULFATE 3 ML: .5; 3 SOLUTION RESPIRATORY (INHALATION) at 12:05

## 2023-05-10 RX ADMIN — IPRATROPIUM BROMIDE AND ALBUTEROL SULFATE 3 ML: .5; 3 SOLUTION RESPIRATORY (INHALATION) at 01:05

## 2023-05-10 RX ADMIN — PIPERACILLIN SODIUM AND TAZOBACTAM SODIUM 4.5 G: 4; .5 INJECTION, POWDER, FOR SOLUTION INTRAVENOUS at 11:05

## 2023-05-10 RX ADMIN — POLYETHYLENE GLYCOL 3350 17 G: 17 POWDER, FOR SOLUTION ORAL at 10:05

## 2023-05-10 NOTE — PLAN OF CARE
[facility]  Discharge Final Note    Primary Care Provider: Primary Doctor No    Expected Discharge Date: 5/10/2023    Final Discharge Note (most recent)       Final Note - 05/10/23 1405          Final Note    Assessment Type Final Discharge Note (P)      Anticipated Discharge Disposition Long Term Care (P)      What phone number can be called within the next 1-3 days to see how you are doing after discharge? [phone] (P)      Hospital Resources/Appts/Education Provided Provided patient/caregiver with written discharge plan information;Appointments scheduled by Navigator/Coordinator (P)         Post-Acute Status    Post-Acute Authorization Placement (P)      Post-Acute Placement Status Set-up Complete/Auth obtained (P)      Discharge Delays Ambulance Transport/Facility Transport (P)                      Important Message from Medicare  Important Message from Medicare regarding Discharge Appeal Rights: Given to patient/caregiver, Explained to patient/caregiver, Signed/date by patient/caregiver     Date IMM was signed: 05/10/23  Time IMM was signed: 0952    Contact Info       W Mj Maurer MD   Specialty: Colon and Rectal Surgery    [facility] [address]   Phone: [phone]       Next Steps: Follow up in 2 week(s)    Instructions: For wound re-check          Patient is discharging back to his nursing home, [facility] in [place], [place]. Pt's hospital follow up appointments are scheduled and in the Pt's AVS.     Pt's stretcher transportation has been scheduled for 2:45 PM, RN and family notified.    RN to call report to [phone],([facility]).    Pt has no other post acute needs and is cleared for discharge from a case management standpoint.     DONELL Eugene, LMSW [facility]  [id number]

## 2023-05-10 NOTE — PLAN OF CARE
CHRISTIANO faxed Pt's NH orders to [facility] at ([phone]) [phone]. SW coordinating with their admissions for transfer back this morning. SW will follow.    11:05 AM  Faxed hard script. Spoke to [facility] admissions, they are reviewing orders.     DONELL Eugene, ROOSEVELT  [facility]  [id number]
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
## 2023-05-10 NOTE — PLAN OF CARE
CHW met with patient/family at bedside. Patient experience rounding completed and reviewed the following.     Do you know your discharge plan? Yes or No,    If yes, what is the plan? (Home, Home Health, Rehab, SNF, LTAC, or Other)    NH    If you are discharging home, do you have help at home? Yes or No      NH    Do you think you will need help at home at discharge? Yes or No        NH    Have you discussed your needs and preferences with your SW/CM? Yes or No    Yes    Assigned SW/CM notified of any patient/family needs or concerns.        Kae Toscano CHW  Case Management   [phone]

## 2023-05-11 ENCOUNTER — PATIENT OUTREACH (OUTPATIENT)
Dept: ADMINISTRATIVE | Facility: CLINIC | Age: 68
End: 2023-05-11
Payer: MEDICARE

## 2023-05-11 VITALS
BODY MASS INDEX: 21.48 KG/M2 | WEIGHT: 141.75 LBS | HEART RATE: 77 BPM | DIASTOLIC BLOOD PRESSURE: 65 MMHG | RESPIRATION RATE: 17 BRPM | OXYGEN SATURATION: 98 % | HEIGHT: 68 IN | TEMPERATURE: 99 F | SYSTOLIC BLOOD PRESSURE: 118 MMHG

## 2023-05-11 LAB
FINAL PATHOLOGIC DIAGNOSIS: NORMAL
GROSS: NORMAL
Lab: NORMAL
MICROSCOPIC EXAM: NORMAL

## 2023-05-11 PROCEDURE — 25000003 PHARM REV CODE 250: Performed by: STUDENT IN AN ORGANIZED HEALTH CARE EDUCATION/TRAINING PROGRAM

## 2023-05-11 PROCEDURE — 99900035 HC TECH TIME PER 15 MIN (STAT)

## 2023-05-11 PROCEDURE — 63600175 PHARM REV CODE 636 W HCPCS: Performed by: STUDENT IN AN ORGANIZED HEALTH CARE EDUCATION/TRAINING PROGRAM

## 2023-05-11 PROCEDURE — 94640 AIRWAY INHALATION TREATMENT: CPT

## 2023-05-11 PROCEDURE — 25000242 PHARM REV CODE 250 ALT 637 W/ HCPCS: Performed by: STUDENT IN AN ORGANIZED HEALTH CARE EDUCATION/TRAINING PROGRAM

## 2023-05-11 PROCEDURE — 94761 N-INVAS EAR/PLS OXIMETRY MLT: CPT

## 2023-05-11 RX ADMIN — PIPERACILLIN SODIUM AND TAZOBACTAM SODIUM 4.5 G: 4; .5 INJECTION, POWDER, FOR SOLUTION INTRAVENOUS at 03:05

## 2023-05-11 RX ADMIN — IPRATROPIUM BROMIDE AND ALBUTEROL SULFATE 3 ML: .5; 3 SOLUTION RESPIRATORY (INHALATION) at 12:05

## 2023-05-26 ENCOUNTER — OFFICE VISIT (OUTPATIENT)
Dept: SURGERY | Facility: CLINIC | Age: 68
End: 2023-05-26
Payer: MEDICARE

## 2023-05-26 DIAGNOSIS — Z93.3 COLOSTOMY IN PLACE: Primary | ICD-10-CM

## 2023-05-26 DIAGNOSIS — C20 RECTAL CANCER: Primary | ICD-10-CM

## 2023-05-26 PROCEDURE — 99213 OFFICE O/P EST LOW 20 MIN: CPT | Mod: PBBFAC,27 | Performed by: NURSE PRACTITIONER

## 2023-05-26 PROCEDURE — 99024 PR POST-OP FOLLOW-UP VISIT: ICD-10-PCS | Mod: POP,,, | Performed by: COLON & RECTAL SURGERY

## 2023-05-26 PROCEDURE — 99024 POSTOP FOLLOW-UP VISIT: CPT | Mod: POP,,, | Performed by: NURSE PRACTITIONER

## 2023-05-26 PROCEDURE — 99024 POSTOP FOLLOW-UP VISIT: CPT | Mod: POP,,, | Performed by: COLON & RECTAL SURGERY

## 2023-05-26 PROCEDURE — 99999 PR PBB SHADOW E&M-EST. PATIENT-LVL II: CPT | Mod: PBBFAC,,, | Performed by: COLON & RECTAL SURGERY

## 2023-05-26 PROCEDURE — 99024 PR POST-OP FOLLOW-UP VISIT: ICD-10-PCS | Mod: POP,,, | Performed by: NURSE PRACTITIONER

## 2023-05-26 PROCEDURE — 99999 PR PBB SHADOW E&M-EST. PATIENT-LVL II: ICD-10-PCS | Mod: PBBFAC,,, | Performed by: COLON & RECTAL SURGERY

## 2023-05-26 PROCEDURE — 99999 PR PBB SHADOW E&M-EST. PATIENT-LVL III: ICD-10-PCS | Mod: PBBFAC,,, | Performed by: NURSE PRACTITIONER

## 2023-05-26 PROCEDURE — 99999 PR PBB SHADOW E&M-EST. PATIENT-LVL III: CPT | Mod: PBBFAC,,, | Performed by: NURSE PRACTITIONER

## 2023-05-26 PROCEDURE — 99212 OFFICE O/P EST SF 10 MIN: CPT | Mod: PBBFAC,27 | Performed by: COLON & RECTAL SURGERY
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
## 2023-05-30 NOTE — PROGRESS NOTES
Mr. Way is unknown to me and comes today after surgery on 5/2/23 with Dr. Maurer.   Pt is seeing me for post op evaluation of colostomy .   The patient reports no problems with new ostomy.   Pt lives in an nursing home. Care provided by nursing staff  Pain level today is 0.    ASSESSMENT:  The colostomy is red,moist, well budded.     The pt wearing a 1piece pouching system by [employer] being worn to side  Average wear time is 1 day as he instructs staff to change daily.   Peristomal skin is clean and dry. Slight erythema to R side. No rashes, no ulcers, no induration    There is no  mucocutaneous separation.  Pt is coping well with new ostomy.  Pouching concerns include:        PLAN:  Patient instructed to do the following routine for pouching:  Cleanse skin with water, dry well.  Apply skin barrier film. Allow to dry  Apply soft convex pouch worn up and down. Change Q3-5 days and prn  Apply pouch sized appropriately for stoma.  Pt counseled on DME suppliers for  ostomy pouches. Currently at [facility] home.  Pt also counseled on skin care, nutrition, hydration and ADL.  I spent greater than 50% of this 30 minute visit in face to face counseling with pt and his sister and brother.  Return clinic visit recommended prn

## 2023-06-20 ENCOUNTER — TELEPHONE (OUTPATIENT)
Dept: SURGERY | Facility: CLINIC | Age: 68
End: 2023-06-20
Payer: MEDICARE

## 2023-06-20 NOTE — TELEPHONE ENCOUNTER
Spoke with patient's brother regarding appointment to see Dr. Maurer. Appointment scheduled and details confirmed verbally over phone. Reminder slip placed in mail.

## 2023-06-20 NOTE — TELEPHONE ENCOUNTER
----- Message from Jacqueline Rodriguez sent at 6/20/2023 11:31 AM CDT -----  Regarding: Pt requesting a call back  Contact: [phone]  Hi, pt's brother called in to ask for the nurse to give him a call. Pt is calling to [facility] ab out an appt in [place] and not [place]. Pls call pt's brother at [phone] to k with pt's brother.
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
## 2023-07-21 ENCOUNTER — OFFICE VISIT (OUTPATIENT)
Dept: SURGERY | Facility: CLINIC | Age: 68
End: 2023-07-21
Payer: MEDICARE

## 2023-07-21 VITALS
SYSTOLIC BLOOD PRESSURE: 114 MMHG | HEIGHT: 68 IN | DIASTOLIC BLOOD PRESSURE: 68 MMHG | WEIGHT: 141.75 LBS | RESPIRATION RATE: 16 BRPM | HEART RATE: 111 BPM | BODY MASS INDEX: 21.48 KG/M2

## 2023-07-21 DIAGNOSIS — Z43.3 ATTENTION TO COLOSTOMY: Primary | ICD-10-CM

## 2023-07-21 DIAGNOSIS — C20 RECTAL ADENOCARCINOMA: Primary | ICD-10-CM

## 2023-07-21 PROCEDURE — 99999 PR PBB SHADOW E&M-EST. PATIENT-LVL IV: CPT | Mod: PBBFAC,,, | Performed by: NURSE PRACTITIONER

## 2023-07-21 PROCEDURE — 99024 PR POST-OP FOLLOW-UP VISIT: ICD-10-PCS | Mod: POP,,, | Performed by: COLON & RECTAL SURGERY

## 2023-07-21 PROCEDURE — 99999 PR PBB SHADOW E&M-EST. PATIENT-LVL IV: ICD-10-PCS | Mod: PBBFAC,,, | Performed by: NURSE PRACTITIONER

## 2023-07-21 PROCEDURE — 99024 PR POST-OP FOLLOW-UP VISIT: ICD-10-PCS | Mod: S$PBB,,, | Performed by: NURSE PRACTITIONER

## 2023-07-21 PROCEDURE — 99024 POSTOP FOLLOW-UP VISIT: CPT | Mod: S$PBB,,, | Performed by: NURSE PRACTITIONER

## 2023-07-21 PROCEDURE — 99214 OFFICE O/P EST MOD 30 MIN: CPT | Mod: PBBFAC | Performed by: NURSE PRACTITIONER

## 2023-07-21 PROCEDURE — 99999 PR PBB SHADOW E&M-EST. PATIENT-LVL I: CPT | Mod: PBBFAC,,, | Performed by: COLON & RECTAL SURGERY

## 2023-07-21 PROCEDURE — 99024 POSTOP FOLLOW-UP VISIT: CPT | Mod: POP,,, | Performed by: COLON & RECTAL SURGERY

## 2023-07-21 PROCEDURE — 99211 OFF/OP EST MAY X REQ PHY/QHP: CPT | Mod: PBBFAC,27 | Performed by: COLON & RECTAL SURGERY

## 2023-07-21 PROCEDURE — 99999 PR PBB SHADOW E&M-EST. PATIENT-LVL I: ICD-10-PCS | Mod: PBBFAC,,, | Performed by: COLON & RECTAL SURGERY

## 2023-07-21 NOTE — PROGRESS NOTES
Mr. Way is unknown to me and comes today after surgery on 5/2/23 with Dr. Maurer.   Pt is seeing me for post op evaluation of colostomy .   The patient reports no problems with new ostomy.   Pt lives in an nursing home. Care provided by nursing staff  Pain level today is 0.    ASSESSMENT:  The colostomy is red,moist, well budded.     The pt wearing a 1piece pouching system by [employer] being worn to side  Average wear time is 1 day as he instructs staff to change daily.   Peristomal skin is clean and dry. Slight erythema to R side. No rashes, no ulcers, no induration    There is no  mucocutaneous separation.  Pt is coping well with new ostomy.  Pouching concerns include:    5/26 7/21      PLAN:  Patient instructed to do the following routine for pouching:  Cleanse skin with water, dry well.  Apply skin barrier film. Allow to dry  Apply soft convex pouch worn up and down. Change Q3-5 days and prn  Apply pouch sized appropriately for stoma.  Pt counseled on DME suppliers for  ostomy pouches. Currently at [facility] home.  Pt also counseled on skin care, nutrition, hydration and ADL.  I spent greater than 50% of this 30 minute visit in face to face counseling with pt and his sister and brother.  Return clinic visit recommended prn

## 2023-08-01 ENCOUNTER — TELEPHONE (OUTPATIENT)
Dept: SURGERY | Facility: CLINIC | Age: 68
End: 2023-08-01
Payer: MEDICARE

## 2023-08-01 NOTE — TELEPHONE ENCOUNTER
----- Message from Jacqueline Rodriguez sent at 8/1/2023 10:30 AM CDT -----  Regarding: Call back requested  Contact: [phone]  Hi, pt's brother Truong called to spk with the nurse about the pt. Pls call the pt at [phone] to spk with the pt's brother.

## 2023-08-01 NOTE — TELEPHONE ENCOUNTER
Spoke with brother regarding concerns about brother. Brother states that patient is in isolation at the nursing facility and doesn't know if Dr. Maurer needs to be involved. Explained to brother that he should ask treating physician at nursing facility what he is being treated for and plan of care moving forward. Brother verbalizes understanding and states that he will call us back to relay information.

## 2023-10-02 ENCOUNTER — TELEPHONE (OUTPATIENT)
Dept: ENDOSCOPY | Facility: HOSPITAL | Age: 68
End: 2023-10-02

## 2023-10-02 ENCOUNTER — CLINICAL SUPPORT (OUTPATIENT)
Dept: ENDOSCOPY | Facility: HOSPITAL | Age: 68
End: 2023-10-02
Attending: COLON & RECTAL SURGERY
Payer: MEDICARE

## 2023-10-02 VITALS — WEIGHT: 139.75 LBS | HEIGHT: 68 IN | BODY MASS INDEX: 21.18 KG/M2

## 2023-10-02 DIAGNOSIS — C20 RECTAL CANCER: Primary | ICD-10-CM

## 2023-10-02 DIAGNOSIS — C20 RECTAL CANCER: ICD-10-CM

## 2023-10-02 NOTE — PLAN OF CARE
PAT call to schedule patient for colonoscopy per Dr. Maurer's referral. Note from Dr. Maurer to schedule patient in spring of 2024. Office visit note by Dr. Maurer stated to f/u with colonoscopy 1 yr. from colostomy creation on 4/26/23. Endoscopy schedule is not available for April at this time. Spoke with patients brother Truong about plan. New PAT will be made to call at end of year so brother can schedule for Yan for around April. Patients brother Truong verbalizes understanding and is in agreement to new PAT call.

## 2023-12-04 ENCOUNTER — TELEPHONE (OUTPATIENT)
Dept: ENDOSCOPY | Facility: HOSPITAL | Age: 68
End: 2023-12-04

## 2023-12-04 ENCOUNTER — CLINICAL SUPPORT (OUTPATIENT)
Dept: ENDOSCOPY | Facility: HOSPITAL | Age: 68
End: 2023-12-04
Attending: COLON & RECTAL SURGERY
Payer: MEDICARE

## 2023-12-04 DIAGNOSIS — C20 RECTAL CANCER: ICD-10-CM

## 2023-12-04 DIAGNOSIS — C20 RECTAL CANCER: Primary | ICD-10-CM

## 2023-12-04 NOTE — TELEPHONE ENCOUNTER
Called pt. S brother to schedule Colonoscopy. Left VM message to return call and new refendo placed for  pt. Next week.

## 2023-12-04 NOTE — PLAN OF CARE
We attempted to reach you for your scheduled PAT appointment but you were not available. Please contact Endoscopy Scheduling at # [phone]

## 2024-03-04 ENCOUNTER — TELEPHONE (OUTPATIENT)
Dept: SURGERY | Facility: CLINIC | Age: 69
End: 2024-03-04
Payer: MEDICARE

## 2024-03-04 NOTE — TELEPHONE ENCOUNTER
Spoke with patient's brother regarding scheduling colonoscopy. Call transferred to schedulers and message sent to endo schedulers.

## 2024-03-04 NOTE — TELEPHONE ENCOUNTER
----- Message from Benita Vaca sent at 3/4/2024  9:12 AM CST -----  Regarding: Missed Call  Contact:  @ [phone]  Message is for olivia Browne's brother missed your call and asking for a call back

## 2024-03-04 NOTE — TELEPHONE ENCOUNTER
----- Message from Cara York sent at 3/4/2024  8:33 AM CST -----  .Type:  Patient Call Back    Who Called: BLAINE RODRIGUEZ       Does the patient know what this is regarding?: SCHEDULING     Would the patient rather a call back YES     Best Call Back Number:  [phone]    Additional Information: Thank You

## 2024-03-05 ENCOUNTER — TELEPHONE (OUTPATIENT)
Dept: ENDOSCOPY | Facility: HOSPITAL | Age: 69
End: 2024-03-05
Payer: MEDICARE

## 2024-03-05 DIAGNOSIS — C20 RECTAL CANCER: Primary | ICD-10-CM

## 2024-03-05 DIAGNOSIS — Z12.11 SCREENING FOR COLON CANCER: Primary | ICD-10-CM

## 2024-03-05 RX ORDER — POLYETHYLENE GLYCOL 3350, SODIUM SULFATE ANHYDROUS, SODIUM BICARBONATE, SODIUM CHLORIDE, POTASSIUM CHLORIDE 236; 22.74; 6.74; 5.86; 2.97 G/4L; G/4L; G/4L; G/4L; G/4L
4 POWDER, FOR SOLUTION ORAL ONCE
Qty: 4000 ML | Refills: 0 | Status: SHIPPED | OUTPATIENT
Start: 2024-03-05 | End: 2024-03-05

## 2024-03-05 NOTE — TELEPHONE ENCOUNTER
Spoke to pt to schedule procedure(s) Colonoscopy       Physician to perform procedure(s) Dr. BRISA Barker Maurer  Date of Procedure (s) 05/31/24  Arrival Time 7:40 AM  Time of Procedure(s) 8:40 AM   Location of Procedure(s) [place] 4th Floor  Type of Rx Prep sent to patient: PEG  Instructions provided to patient via [facility]Mailed    Patient was informed on the following information and verbalized understanding. Screening questionnaire reviewed with patient and complete. If procedure requires anesthesia, a responsible adult needs to be present to accompany the patient home, patient cannot drive after receiving anesthesia. Appointment details are tentative, especially check-in time. Patient will receive a prep-op call 7 days prior to confirm check-in time for procedure. If applicable the patient should contact their pharmacy to verify Rx for procedure prep is ready for pick-up. Patient was advised to call the scheduling department at [phone] if pharmacy states no Rx is available. Patient was advised to call the endoscopy scheduling department if any questions or concerns arise.      [facility]oscopy Scheduling Department

## 2024-03-05 NOTE — TELEPHONE ENCOUNTER
----- Message from Mary Hassan sent at 3/4/2024 11:25 AM CST -----  Regarding: FW: Missed Call  Contact:  @ [phone]    ----- Message -----  From: Hollie Crawford RN  Sent: 3/4/2024   9:26 AM CST  To: [facility] Schedulers; #  Subject: FW: Missed Call                                  Please contact for scheduling.  ----- Message -----  From: Benita Vaca  Sent: 3/4/2024   9:13 AM CST  To: [facility] Staff  Subject: Missed Call                                      Message is for olivia Browne's brother missed your call and asking for a call back

## 2024-04-25 ENCOUNTER — TELEPHONE (OUTPATIENT)
Dept: SURGERY | Facility: CLINIC | Age: 69
End: 2024-04-25
Payer: MEDICARE

## 2024-04-25 ENCOUNTER — TELEPHONE (OUTPATIENT)
Dept: ENDOSCOPY | Facility: HOSPITAL | Age: 69
End: 2024-04-25
Payer: MEDICARE

## 2024-04-25 DIAGNOSIS — Z85.048 HISTORY OF RECTAL CANCER: Primary | ICD-10-CM

## 2024-04-25 NOTE — TELEPHONE ENCOUNTER
----- Message from Hollie Crawford RN sent at 4/25/2024 11:41 AM CDT -----  Good morning,    Please contact [facility] ([phone]) to send instructions and RX for colonoscopy on 5/31. This patient resides there.    Thanks!

## 2024-04-25 NOTE — TELEPHONE ENCOUNTER
Spoke with patient's brother and sister in law regarding labs and CT the day of colonoscopy on 5/31. Details confirmed verbally over phone and reminder slips placed in mail. Family requests that colonoscopy instructions and prescription be sent to [facility]. Explained that I will send a message to our schedulers to get in touch with the [facility]. Family verbalizes understanding.

## 2024-05-29 ENCOUNTER — TELEPHONE (OUTPATIENT)
Dept: ENDOSCOPY | Facility: HOSPITAL | Age: 69
End: 2024-05-29
Payer: MEDICARE

## 2024-05-29 DIAGNOSIS — Z12.11 SCREEN FOR COLON CANCER: Primary | ICD-10-CM

## 2024-05-29 RX ORDER — POLYETHYLENE GLYCOL 3350, SODIUM SULFATE ANHYDROUS, SODIUM BICARBONATE, SODIUM CHLORIDE, POTASSIUM CHLORIDE 236; 22.74; 6.74; 5.86; 2.97 G/4L; G/4L; G/4L; G/4L; G/4L
4 POWDER, FOR SOLUTION ORAL ONCE
Qty: 1 EACH | Refills: 0 | Status: CANCELLED | OUTPATIENT
Start: 2024-05-29 | End: 2024-05-29

## 2024-05-29 RX ORDER — POLYETHYLENE GLYCOL 3350, SODIUM SULFATE ANHYDROUS, SODIUM BICARBONATE, SODIUM CHLORIDE, POTASSIUM CHLORIDE 236; 22.74; 6.74; 5.86; 2.97 G/4L; G/4L; G/4L; G/4L; G/4L
4 POWDER, FOR SOLUTION ORAL ONCE
Qty: 1 EACH | Refills: 0 | Status: SHIPPED | OUTPATIENT
Start: 2024-05-29 | End: 2024-05-29

## 2024-05-29 NOTE — TELEPHONE ENCOUNTER
Returned patients brother phone call. Patient does not have prep or instructions for colonoscopy on 5/31/2024. Spoke to nursing home along with patients brother to send orders and prep instructions to nursing home. Nursing home will try to get transportation for patient to get to procedure. Prep also sent to [facility] and patients brother will deliver it to the nursing home.

## 2024-05-29 NOTE — TELEPHONE ENCOUNTER
----- Message from Mary Hassan sent at 5/29/2024  9:42 AM CDT -----    ----- Message -----  From: Manny Kay RN  Sent: 5/29/2024   7:39 AM CDT  To: [facility] Endo Schedulers      ----- Message -----  From: Lexy Gonzalez  Sent: 5/28/2024   8:02 PM CDT  To: [facility] Staff    Type: General Call Back    Name of Caller:Patients bother Truong Way  Reason Colonoscopy prep directions  Would the patient rather a call back or a response via [facility]? call  Best Call Back Number:[phone] or [phone]  Additional Information: Please give patients brother a call back as soon as possible for colonoscopy prep directions.  Colonoscopy is scheduled for Friday, May 31st and patient needs to know what needs to be done before reporting for procedure.

## 2024-05-31 ENCOUNTER — ANESTHESIA (OUTPATIENT)
Dept: ENDOSCOPY | Facility: HOSPITAL | Age: 69
End: 2024-05-31
Payer: MEDICARE

## 2024-05-31 ENCOUNTER — TELEPHONE (OUTPATIENT)
Dept: SURGERY | Facility: CLINIC | Age: 69
End: 2024-05-31
Payer: MEDICARE

## 2024-05-31 ENCOUNTER — HOSPITAL ENCOUNTER (OUTPATIENT)
Facility: HOSPITAL | Age: 69
Discharge: HOME | End: 2024-05-31
Attending: COLON & RECTAL SURGERY | Admitting: COLON & RECTAL SURGERY
Payer: MEDICARE

## 2024-05-31 ENCOUNTER — ANESTHESIA EVENT (OUTPATIENT)
Dept: ENDOSCOPY | Facility: HOSPITAL | Age: 69
End: 2024-05-31
Payer: MEDICARE

## 2024-05-31 VITALS
WEIGHT: 125 LBS | HEIGHT: 63 IN | BODY MASS INDEX: 22.15 KG/M2 | TEMPERATURE: 98 F | SYSTOLIC BLOOD PRESSURE: 178 MMHG | HEART RATE: 103 BPM | RESPIRATION RATE: 18 BRPM | DIASTOLIC BLOOD PRESSURE: 109 MMHG | OXYGEN SATURATION: 96 %

## 2024-05-31 DIAGNOSIS — Z85.048 HISTORY OF RECTAL CANCER: Primary | ICD-10-CM

## 2024-05-31 DIAGNOSIS — Z85.048 HISTORY OF RECTAL CANCER: ICD-10-CM

## 2024-05-31 DIAGNOSIS — C20 RECTAL ADENOCARCINOMA: Primary | ICD-10-CM

## 2024-05-31 LAB — POCT GLUCOSE: 115 MG/DL (ref 70–110)

## 2024-05-31 PROCEDURE — 25000003 PHARM REV CODE 250: Performed by: NURSE ANESTHETIST, CERTIFIED REGISTERED

## 2024-05-31 PROCEDURE — 44388 COLONOSCOPY THRU STOMA SPX: CPT | Mod: ,,, | Performed by: COLON & RECTAL SURGERY

## 2024-05-31 PROCEDURE — 37000009 HC ANESTHESIA EA ADD 15 MINS: Performed by: COLON & RECTAL SURGERY

## 2024-05-31 PROCEDURE — 25000003 PHARM REV CODE 250: Mod: UD | Performed by: COLON & RECTAL SURGERY

## 2024-05-31 PROCEDURE — 82962 GLUCOSE BLOOD TEST: CPT | Performed by: COLON & RECTAL SURGERY

## 2024-05-31 PROCEDURE — 44388 COLONOSCOPY THRU STOMA SPX: CPT | Performed by: COLON & RECTAL SURGERY

## 2024-05-31 PROCEDURE — 63600175 PHARM REV CODE 636 W HCPCS: Performed by: NURSE ANESTHETIST, CERTIFIED REGISTERED

## 2024-05-31 PROCEDURE — 37000008 HC ANESTHESIA 1ST 15 MINUTES: Performed by: COLON & RECTAL SURGERY

## 2024-05-31 RX ORDER — LIDOCAINE HYDROCHLORIDE 20 MG/ML
INJECTION INTRAVENOUS
Status: DISCONTINUED | OUTPATIENT
Start: 2024-05-31 | End: 2024-05-31

## 2024-05-31 RX ORDER — PROPOFOL 10 MG/ML
VIAL (ML) INTRAVENOUS
Status: DISCONTINUED | OUTPATIENT
Start: 2024-05-31 | End: 2024-05-31

## 2024-05-31 RX ORDER — SODIUM CHLORIDE 9 MG/ML
INJECTION, SOLUTION INTRAVENOUS CONTINUOUS
Status: DISCONTINUED | OUTPATIENT
Start: 2024-05-31 | End: 2024-05-31 | Stop reason: HOSPADM

## 2024-05-31 RX ADMIN — PROPOFOL 20 MG: 10 INJECTION, EMULSION INTRAVENOUS at 09:05

## 2024-05-31 RX ADMIN — LIDOCAINE HYDROCHLORIDE 100 MG: 20 INJECTION INTRAVENOUS at 09:05

## 2024-05-31 RX ADMIN — PROPOFOL 30 MG: 10 INJECTION, EMULSION INTRAVENOUS at 09:05

## 2024-05-31 RX ADMIN — SODIUM CHLORIDE: 0.9 INJECTION, SOLUTION INTRAVENOUS at 09:05

## 2024-05-31 NOTE — TELEPHONE ENCOUNTER
Spoke with Suzanna at [facility]. Orders for CT and labs placed externally and faxed to [phone].

## 2024-05-31 NOTE — TRANSFER OF CARE
"Anesthesia Transfer of Care Note    Patient: Yan Way    Procedure(s) Performed: Procedure(s) (LRB):  COLONOSCOPY (N/A)    Patient location: GI    Anesthesia Type: general    Transport from OR: Transported from OR on room air with adequate spontaneous ventilation    Post pain: adequate analgesia    Post assessment: no apparent anesthetic complications and tolerated procedure well    Post vital signs: stable    Level of consciousness: responds to stimulation    Nausea/Vomiting: no nausea/vomiting    Complications: none    Transfer of care protocol was followed      Last vitals: Visit Vitals  BP (!) 171/107 (BP Location: Left arm, Patient Position: Lying)   Pulse 104   Temp 36.7 °C (98.1 °F) (Temporal)   Resp 18   Ht 5' 3" (1.6 m)   Wt 56.7 kg (125 lb)   SpO2 97%   BMI 22.14 kg/m²     "

## 2024-05-31 NOTE — PROVATION PATIENT INSTRUCTIONS
Discharge Summary/Instructions after an Endoscopic Procedure  Patient Name: Yan Way  Patient MRN: [id number]  Patient YOB: 1955  Friday, May 31, 2024  Pratik Gray MD  Dear patient,  As a result of recent federal legislation (The Federal Cures Act), you may   receive lab or pathology results from your procedure in your [facility]   account before your physician is able to contact you. Your physician or   their representative will relay the results to you with their   recommendations at their soonest availability.  Thank you,  RESTRICTIONS:  During your procedure today, you received medications for sedation.  These   medications may affect your judgment, balance and coordination.  Therefore,   for 24 hours, you have the following restrictions:   - DO NOT drive a car, operate machinery, make legal/financial decisions,   sign important papers or drink alcohol.    ACTIVITY:  Today: no heavy lifting, straining or running due to procedural   sedation/anesthesia.  The following day: return to full activity including work.  DIET:  Eat and drink normally unless instructed otherwise.     TREATMENT FOR COMMON SIDE EFFECTS:  - Mild abdominal pain, nausea, belching, bloating or excessive gas:  rest,   eat lightly and use a heating pad.  - Sore Throat: treat with throat lozenges and/or gargle with warm salt   water.  - Because air was used during the procedure, expelling large amounts of air   from your rectum or belching is normal.  - If a bowel prep was taken, you may not have a bowel movement for 1-3 days.    This is normal.  SYMPTOMS TO WATCH FOR AND REPORT TO YOUR PHYSICIAN:  1. Abdominal pain or bloating, other than gas cramps.  2. Chest pain.  3. Back pain.  4. Signs of infection such as: chills or fever occurring within 24 hours   after the procedure.  5. Rectal bleeding, which would show as bright red, maroon, or black stools.   (A tablespoon of blood from the rectum is not serious, especially if    hemorrhoids are present.)  6. Vomiting.  7. Weakness or dizziness.  GO DIRECTLY TO THE NEAREST EMERGENCY ROOM IF YOU HAVE ANY OF THE FOLLOWING:      Difficulty breathing              Chills and/or fever over 101 F   Persistent vomiting and/or vomiting blood   Severe abdominal pain   Severe chest pain   Black, tarry stools   Bleeding- more than one tablespoon   Any other symptom or condition that you feel may need urgent attention  Your doctor recommends these additional instructions:  If any biopsies were taken, your doctors clinic will contact you in 1 to 2   weeks with any results.  - The patient will be observed post-procedure, until all discharge criteria   are met.   - Discharge patient to home (via cart).   - Resume regular diet indefinitely.   - Continue present medications.   - Repeat post-surgical lower GI endoscopy PRN for surveillance.  For questions, problems or results please call your physician - Pratik Gray MD at Work:  [phone].  [facility], EMERGENCY ROOM PHONE NUMBER: [phone]  IF A COMPLICATION OR EMERGENCY SITUATION ARISES AND YOU ARE UNABLE TO REACH   YOUR PHYSICIAN - GO DIRECTLY TO THE EMERGENCY ROOM.  Pratik Gray MD  5/31/2024 10:14:11 AM  This report has been verified and signed electronically.  Dear patient,  As a result of recent federal legislation (The Federal Cures Act), you may   receive lab or pathology results from your procedure in your [facility]   account before your physician is able to contact you. Your physician or   their representative will relay the results to you with their   recommendations at their soonest availability.  Thank you,  PROVATION

## 2024-05-31 NOTE — ANESTHESIA POSTPROCEDURE EVALUATION
Anesthesia Post Evaluation    Patient: Yan Way    Procedure(s) Performed: Procedure(s) (LRB):  COLONOSCOPY (N/A)    Final Anesthesia Type: general      Patient location during evaluation: PACU  Patient participation: Yes- Able to Participate  Level of consciousness: awake and alert  Post-procedure vital signs: reviewed and stable  Pain management: adequate  Airway patency: patent    PONV status at discharge: No PONV  Anesthetic complications: no      Cardiovascular status: blood pressure returned to baseline  Respiratory status: unassisted  Hydration status: euvolemic  Follow-up not needed.              Vitals Value Taken Time   /109 05/31/24 1055   Temp  05/31/24 1145   Pulse 103 05/31/24 1055   Resp 18 05/31/24 1055   SpO2 96 % 05/31/24 1055         Event Time   Out of Recovery 11:25:02         Pain/Kayy Score: Kayy Score: 10 (5/31/2024 10:34 AM)
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
## 2024-05-31 NOTE — TELEPHONE ENCOUNTER
Spoke with Suzanna at the [facility]. Explained that the patient needs a CT and labs. Orders faxed to [phone] for CT and labs closer to facility.

## 2024-05-31 NOTE — ANESTHESIA PREPROCEDURE EVALUATION
[facility]  Anesthesia Pre-Operative Evaluation     Patient Name: Yan Way  YOB: 1955  MRN: [id number]  [facility]: [id number]       Admit Date: 5/31/2024   Admit Team: Networked reference to record PCT   Hospital Day: 1  Date of Procedure: 5/31/2024  Anesthesia: Choice Procedure: Procedure(s) (LRB):  COLONOSCOPY (N/A)  Pre-Operative Diagnosis: Rectal cancer [C20]  Proceduralist:Surgeons and Role:     * BRISA Maurer MD - Primary  Code Status: Prior   Advanced Directive: <no information>  Isolation Precautions: No active isolations  Capacity: Full capacity     SUBJECTIVE:   Yan Way is a 69 y.o. male who  has a past medical history of Diabetes, Hyperlipidemia, Rectal cancer (11/11/2022), and Seizure.  No notes on file     sodium chloride 0.9%   Intravenous Continuous         Hospital LOS: 0 days  ICU LOS: Patient does not have an ICU stay during this admission.    he has a current medication list which includes the following long-term medication(s): buspirone, clobetasol, dorzolamide-timolol 2-0.5%, gabapentin, insulin nph-insulin regular (70/30), latanoprost, levetiracetam, loratadine, metformin, nystatin, sertraline, and tamsulosin.     ALLERGIES:     Review of patient's allergies indicates:   Allergen Reactions    Latex, natural rubber Anxiety     LDA:   AIRWAY:         [unfilled]     Lines/Drains/Airways       Drain  Duration                  Colostomy 04/26/23 1513  days                   Anesthesia Evaluation      Airway   Mallampati: II  TM distance: Normal  Neck ROM: Normal ROM  Dental    (+) Loose teeth        Pulmonary    Cardiovascular   Exercise tolerance: poor    Neuro/Psych    (+) seizures well controlled    GI/Hepatic/Renal      Endo/Other    (+) diabetes mellitus type 2 well controlled using insulin  Abdominal                  MEDICATIONS:     Current Outpatient Medications on File Prior to Encounter   Medication Sig Dispense Refill Last Dose     acetaminophen (TYLENOL) 325 MG tablet Take 2 tablets (650 mg total) by mouth every 6 (six) hours as needed for Pain. (Patient not taking: Reported on 7/21/2023) 12 tablet 0     acetaminophen (TYLENOL) 650 MG TbSR Take 1 tablet (650 mg total) by mouth every 8 (eight) hours. 40 tablet 3     busPIRone (BUSPAR) 7.5 MG tablet Take 7.5 mg by mouth 2 (two) times a day.       clobetasoL (CLOBEX) 0.05 % lotion Apply topically.       dorzolamide-timolol 2-0.5% (COSOPT) 22.3-6.8 mg/mL ophthalmic solution Place into both eyes.       fluticasone propionate (FLONASE) 50 mcg/actuation nasal spray by Each Nostril route.       gabapentin (NEURONTIN) 300 MG capsule Take 1 capsule (300 mg total) by mouth 3 (three) times daily. for 14 days 42 capsule 0     ibuprofen (ADVIL,MOTRIN) 800 MG tablet Take 1 tablet (800 mg total) by mouth every 8 (eight) hours as needed for Pain. 30 tablet 0     insulin NPH-insulin regular, 70/30, 100 unit/mL (70-30) injection 36 Units in AM and 26 Units in PM       latanoprost 0.005 % ophthalmic solution Apply 1 drop to eye every evening.       levETIRAcetam (KEPPRA) 750 MG Tab Take 750 mg by mouth 2 (two) times daily.       loratadine (CLARITIN) 10 mg tablet Take 10 mg by mouth.       magnesium chloride (MAG 64) 64 mg TbEC Take 1 tablet by mouth once daily.       metFORMIN (GLUCOPHAGE) 500 MG tablet Take 500 mg by mouth 2 (two) times daily with meals.       nystatin (MYCOSTATIN) powder nystatin 100,000 unit/gram topical powder   APPLY TO THE AFFECTED AREA(S) BY TOPICAL ROUTE 2 TIMES PER DAY UNTIL HEALED       oxyCODONE (ROXICODONE) 5 MG immediate release tablet Take 1 tablet (5 mg total) by mouth every 4 (four) hours as needed for Pain. (Patient not taking: Reported on 7/21/2023) 10 tablet 0     oxyCODONE (ROXICODONE) 5 MG immediate release tablet Take 1 tablet (5 mg total) by mouth every 6 (six) hours as needed for Pain. (Patient not taking: Reported on 7/21/2023) 15 tablet 0      phenylephrine-DM-guaiFENesin 5- mg/5 mL Liqd Take by mouth.       polyethylene glycol (GLYCOLAX) 17 gram/dose powder Use cap to measure 17g, mix with liquid, and take by mouth daily as needed (Constipation). 714 g 0     salicylic acid 2 % Crea 2 g.       sertraline (ZOLOFT) 50 MG tablet Take 100 mg by mouth every evening.       tamsulosin (FLOMAX) 0.4 mg Cap Take 0.4 mg by mouth.         Inpatient Medications:  Antibiotics (From admission, onward)      None          VTE Risk Mitigation (From admission, onward)      None              Current Facility-Administered Medications   Medication Dose Route Frequency Provider Last Rate Last Admin    0.9%  NaCl infusion   Intravenous Continuous BRISA Maurer MD              History:   There are no hospital problems to display for this patient.    Surgical History:    has a past surgical history that includes Back surgery; Hip replacement arthroplasty; Tibia fracture surgery; Laparoscopic proctectomy (N/A, 4/26/2023); and Flexible sigmoidoscopy (N/A, 4/26/2023).   Social History:    has no history on file for sexual activity.  reports that he has never smoked. He does not have any smokeless tobacco history on file. He reports that he does not currently use alcohol. He reports that he does not currently use drugs.    There were no vitals filed for this visit.  Vital Signs Range (Last 24H):       There is no height or weight on file to calculate BMI.  Wt Readings from Last 4 Encounters:   10/02/23 63.4 kg (139 lb 12.4 oz)   07/21/23 64.3 kg (141 lb 12.1 oz)   05/02/23 64.3 kg (141 lb 12.1 oz)   04/26/23 59 kg (130 lb)        Intake/Output - Last 3 Shifts       None          Lab Results   Component Value Date    WBC 7.60 05/10/2023    HGB 8.6 (L) 05/10/2023    HCT 30.4 (L) 05/10/2023     05/10/2023     05/10/2023    K 3.6 05/10/2023    CL 99 05/10/2023    CREATININE 0.8 05/10/2023    BUN 14 05/10/2023    CO2 28 05/10/2023     (H) 05/10/2023  "   CALCIUM 8.1 (L) 05/10/2023    MG 2.1 05/10/2023    PHOS 3.3 05/10/2023    ALKPHOS 58 05/10/2023    ALT 5 (L) 05/10/2023    AST 10 05/10/2023    ALBUMIN 2.1 (L) 05/10/2023    INR 1.0 05/06/2023    HGBA1C 6.8 (H) 04/27/2023     (H) 05/05/2023     No results found for this or any previous visit (from the past 12 hour(s)).  No results for input(s): "WBC", "HGB", "HCT", "PLT", "NA", "K", "CREATININE", "GLU", "INR" in the last 168 hours.  No LMP for male patient.    EKG:   No results found for this or any previous visit.  TTE:  No results found for this or any previous visit.  No results found for this or any previous visit.  MARCELLUS:  No results found for this or any previous visit.  Stress Test:  No results found for this or any previous visit.     LHC:  No results found for this or any previous visit.     PFT:  No results found for: "FEV1", "FVC", "YIA9WBJ", "TLC", "DLCO"                                                                                                                  05/31/2024  Yan Way is a 69 y.o., male.      Pre-op Assessment    I have reviewed the Patient Summary Reports.       I have reviewed the Medications.     Review of Systems  Anesthesia Hx:  No problems with previous Anesthesia   History of prior surgery of interest to airway management or planning:  Previous anesthesia: General           Social:  Non-Smoker, No Alcohol Use       Hematology/Oncology:  Hematology Normal   Oncology Normal                                   EENT/Dental:  EENT/Dental Normal           Cardiovascular:  Exercise tolerance: poor                                           Pulmonary:  Pulmonary Normal                       Renal/:  Renal/ Normal                 Hepatic/GI:  Hepatic/GI Normal                 Musculoskeletal:  Musculoskeletal Normal                Neurological:       Seizures, well controlled           Seizure Disorder                          Endocrine:  Diabetes, well controlled, type " 2, using insulin    Diabetes                      Dermatological:  Skin Normal        Physical Exam  General: Cooperative and Alert    Airway:  Mallampati: II   Mouth Opening: Normal  TM Distance: Normal  Tongue: Normal  Neck ROM: Normal ROM    Dental:  Loose teeth      Anesthesia Plan  Type of Anesthesia, risks & benefits discussed:    Anesthesia Type: Gen Natural Airway  Intra-op Monitoring Plan: Standard ASA Monitors  Post Op Pain Control Plan: multimodal analgesia and IV/PO Opioids PRN  Induction:  IV  Airway Plan: , Post-Induction  Informed Consent: Informed consent signed with the Patient and all parties understand the risks and agree with anesthesia plan.  All questions answered.   ASA Score: 3  Anesthesia Plan Notes: Brother signs for patient. Pt very poor historian.    Ready For Surgery From Anesthesia Perspective.     .

## 2024-06-03 ENCOUNTER — TELEPHONE (OUTPATIENT)
Dept: SURGERY | Facility: CLINIC | Age: 69
End: 2024-06-03
Payer: MEDICARE

## 2024-06-03 NOTE — TELEPHONE ENCOUNTER
Refaxed lab and CT orders to [phone].   Called [facility], spoke with Jill, states she did receive orders. Address to [facility] taken down so that she may mail CT disc and report.

## 2024-06-03 NOTE — TELEPHONE ENCOUNTER
----- Message from Francisco Oneal sent at 6/3/2024  8:25 AM CDT -----  Type:  Patient Returning Call    Who Called:Jill Childs in [place]  Who Left Message for Patient:  Does the patient know what this is regarding?:CT SCAN AND   Would the patient rather a call back or a response via [facility]? Call  Best Call Back Number: [phone]  Additional Information: Caller states they were supposed to receive orders that pt did not complete while he was at facility and office was supposed to fax orders closer to his home to get done. Facility states that have not received them and would like to have them faxed over again. Thank you        FAX-  [phone]

## 2024-10-23 ENCOUNTER — PATIENT MESSAGE (OUTPATIENT)
Dept: RESEARCH | Facility: HOSPITAL | Age: 69
End: 2024-10-23
Payer: MEDICARE

## (undated) DEVICE — SUT CTD VICRYL VIL BR CR/SH

## (undated) DEVICE — TROCAR ENDOPATH XCEL 5X75MM

## (undated) DEVICE — DRAPE CORETEMP FLD WRM 56X62IN

## (undated) DEVICE — MARKER SKIN STND TIP BLUE BARR

## (undated) DEVICE — SEE MEDLINE ITEM 156902

## (undated) DEVICE — STAPLER CONTOUR CRV GRN 40MM

## (undated) DEVICE — NDL BOX COUNTER

## (undated) DEVICE — SUT CTD VICRYL 3-0 VIL BR

## (undated) DEVICE — TUBING HF INSUFFLATION W/ FLTR

## (undated) DEVICE — PAD PINK TRENDELENBURG POS XL

## (undated) DEVICE — RELOAD CONTOUR 40MM GREEN

## (undated) DEVICE — TIP YANKAUERS BULB NO VENT

## (undated) DEVICE — DRAPE INCISE IOBAN 2 23X17IN

## (undated) DEVICE — TROCAR ENDOPATH XCEL 5MM 7.5CM

## (undated) DEVICE — COVER LIGHT HANDLE 80/CA

## (undated) DEVICE — DRAPE ABDOMINAL TIBURON 14X11

## (undated) DEVICE — BANDAGE ADHESIVE PLAS STRL 1X3

## (undated) DEVICE — KIT ANTIFOG W/SPONG & FLUID

## (undated) DEVICE — SUT 3/0 27IN PDS II VIO MO

## (undated) DEVICE — TRAY CATH FOL SIL URIMTR 16FR

## (undated) DEVICE — TUBING SUCTION STERILE

## (undated) DEVICE — LUBRICANT SURGILUBE 2 OZ

## (undated) DEVICE — SUT 0 8-18 IN CTD VICRYL

## (undated) DEVICE — KIT GELPORT LAPAROSCOPIC ABD

## (undated) DEVICE — ELECTRODE REM PLYHSV RETURN 9

## (undated) DEVICE — SEALER LIGASURE LAP 37CM 5MM

## (undated) DEVICE — BOWL STERILE LARGE 32OZ

## (undated) DEVICE — DRAPE LEGGINGS CUFF 33X51IN

## (undated) DEVICE — COVER MAYO STND XL 30X57IN

## (undated) DEVICE — TOWEL OR DISP STRL BLUE 4/PK